# Patient Record
Sex: MALE | Race: WHITE | NOT HISPANIC OR LATINO | URBAN - METROPOLITAN AREA
[De-identification: names, ages, dates, MRNs, and addresses within clinical notes are randomized per-mention and may not be internally consistent; named-entity substitution may affect disease eponyms.]

---

## 2018-01-04 ENCOUNTER — OUTPATIENT (OUTPATIENT)
Dept: OUTPATIENT SERVICES | Facility: HOSPITAL | Age: 83
LOS: 1 days | Discharge: HOME | End: 2018-01-04

## 2018-01-04 DIAGNOSIS — R05 COUGH: ICD-10-CM

## 2018-02-09 ENCOUNTER — INPATIENT (INPATIENT)
Facility: HOSPITAL | Age: 83
LOS: 2 days | Discharge: HOME | End: 2018-02-12
Attending: HOSPITALIST

## 2018-02-09 VITALS
SYSTOLIC BLOOD PRESSURE: 143 MMHG | HEART RATE: 89 BPM | TEMPERATURE: 99 F | RESPIRATION RATE: 16 BRPM | DIASTOLIC BLOOD PRESSURE: 68 MMHG | OXYGEN SATURATION: 96 %

## 2018-02-09 DIAGNOSIS — Z98.890 OTHER SPECIFIED POSTPROCEDURAL STATES: Chronic | ICD-10-CM

## 2018-02-09 DIAGNOSIS — A41.9 SEPSIS, UNSPECIFIED ORGANISM: ICD-10-CM

## 2018-02-09 DIAGNOSIS — N20.0 CALCULUS OF KIDNEY: ICD-10-CM

## 2018-02-09 LAB
ALBUMIN SERPL ELPH-MCNC: 3.2 G/DL — SIGNIFICANT CHANGE UP (ref 3–5.5)
ALP SERPL-CCNC: 49 U/L — SIGNIFICANT CHANGE UP (ref 30–115)
ALT FLD-CCNC: 13 U/L — SIGNIFICANT CHANGE UP (ref 0–41)
ANION GAP SERPL CALC-SCNC: 10 MMOL/L — SIGNIFICANT CHANGE UP (ref 7–14)
APPEARANCE UR: (no result)
APTT BLD: 25 SEC — LOW (ref 27–39.2)
AST SERPL-CCNC: 18 U/L — SIGNIFICANT CHANGE UP (ref 0–41)
BACTERIA # UR AUTO: (no result) /HPF
BASOPHILS # BLD AUTO: 0.03 K/UL — SIGNIFICANT CHANGE UP (ref 0–0.2)
BASOPHILS NFR BLD AUTO: 0.1 % — SIGNIFICANT CHANGE UP (ref 0–1)
BILIRUB SERPL-MCNC: 1 MG/DL — SIGNIFICANT CHANGE UP (ref 0.2–1.2)
BILIRUB UR-MCNC: NEGATIVE — SIGNIFICANT CHANGE UP
BUN SERPL-MCNC: 21 MG/DL — HIGH (ref 10–20)
CALCIUM SERPL-MCNC: 8.8 MG/DL — SIGNIFICANT CHANGE UP (ref 8.5–10.1)
CHLORIDE SERPL-SCNC: 104 MMOL/L — SIGNIFICANT CHANGE UP (ref 98–110)
CO2 SERPL-SCNC: 25 MMOL/L — SIGNIFICANT CHANGE UP (ref 17–32)
COLOR SPEC: YELLOW — SIGNIFICANT CHANGE UP
CREAT SERPL-MCNC: 1.2 MG/DL — SIGNIFICANT CHANGE UP (ref 0.7–1.5)
DIFF PNL FLD: (no result)
EOSINOPHIL # BLD AUTO: 0 K/UL — SIGNIFICANT CHANGE UP (ref 0–0.7)
EOSINOPHIL NFR BLD AUTO: 0 % — SIGNIFICANT CHANGE UP (ref 0–8)
GLUCOSE SERPL-MCNC: 147 MG/DL — HIGH (ref 70–110)
GLUCOSE UR QL: NEGATIVE MG/DL — SIGNIFICANT CHANGE UP
HCT VFR BLD CALC: 43.8 % — SIGNIFICANT CHANGE UP (ref 42–52)
HGB BLD-MCNC: 14.6 G/DL — SIGNIFICANT CHANGE UP (ref 14–18)
IMM GRANULOCYTES NFR BLD AUTO: 0.6 % — HIGH (ref 0.1–0.3)
INR BLD: 1.3 RATIO — SIGNIFICANT CHANGE UP (ref 0.65–1.3)
KETONES UR-MCNC: NEGATIVE — SIGNIFICANT CHANGE UP
LACTATE SERPL-SCNC: 1.6 MMOL/L — SIGNIFICANT CHANGE UP (ref 0.5–2.2)
LEUKOCYTE ESTERASE UR-ACNC: (no result)
LYMPHOCYTES # BLD AUTO: 1.38 K/UL — SIGNIFICANT CHANGE UP (ref 1.2–3.4)
LYMPHOCYTES # BLD AUTO: 6.5 % — LOW (ref 20.5–51.1)
MCHC RBC-ENTMCNC: 30.4 PG — SIGNIFICANT CHANGE UP (ref 27–31)
MCHC RBC-ENTMCNC: 33.3 G/DL — SIGNIFICANT CHANGE UP (ref 32–37)
MCV RBC AUTO: 91.1 FL — SIGNIFICANT CHANGE UP (ref 80–94)
MONOCYTES # BLD AUTO: 1.28 K/UL — HIGH (ref 0.1–0.6)
MONOCYTES NFR BLD AUTO: 6 % — SIGNIFICANT CHANGE UP (ref 1.7–9.3)
NEUTROPHILS # BLD AUTO: 18.48 K/UL — HIGH (ref 1.4–6.5)
NEUTROPHILS NFR BLD AUTO: 86.8 % — HIGH (ref 42.2–75.2)
NITRITE UR-MCNC: POSITIVE
NRBC # BLD: 0 /100 WBCS — SIGNIFICANT CHANGE UP (ref 0–0)
PH UR: 6 — SIGNIFICANT CHANGE UP (ref 5–8)
PLATELET # BLD AUTO: 258 K/UL — SIGNIFICANT CHANGE UP (ref 130–400)
POTASSIUM SERPL-MCNC: 3.7 MMOL/L — SIGNIFICANT CHANGE UP (ref 3.5–5)
POTASSIUM SERPL-SCNC: 3.7 MMOL/L — SIGNIFICANT CHANGE UP (ref 3.5–5)
PROT SERPL-MCNC: 6.6 G/DL — SIGNIFICANT CHANGE UP (ref 6–8)
PROT UR-MCNC: 100 MG/DL
PROTHROM AB SERPL-ACNC: 14.1 SEC — HIGH (ref 9.95–12.87)
RBC # BLD: 4.81 M/UL — SIGNIFICANT CHANGE UP (ref 4.7–6.1)
RBC # FLD: 13.6 % — SIGNIFICANT CHANGE UP (ref 11.5–14.5)
RBC CASTS # UR COMP ASSIST: (no result) /HPF
SODIUM SERPL-SCNC: 139 MMOL/L — SIGNIFICANT CHANGE UP (ref 135–146)
SP GR SPEC: 1.02 — SIGNIFICANT CHANGE UP (ref 1.01–1.03)
UROBILINOGEN FLD QL: 0.2 MG/DL — SIGNIFICANT CHANGE UP (ref 0.2–0.2)
WBC # BLD: 21.3 K/UL — HIGH (ref 4.8–10.8)
WBC # FLD AUTO: 21.3 K/UL — HIGH (ref 4.8–10.8)
WBC UR QL: >50 /HPF

## 2018-02-09 RX ORDER — ENOXAPARIN SODIUM 100 MG/ML
40 INJECTION SUBCUTANEOUS EVERY 24 HOURS
Qty: 0 | Refills: 0 | Status: DISCONTINUED | OUTPATIENT
Start: 2018-02-09 | End: 2018-02-12

## 2018-02-09 RX ORDER — ACETAMINOPHEN 500 MG
650 TABLET ORAL EVERY 6 HOURS
Qty: 0 | Refills: 0 | Status: DISCONTINUED | OUTPATIENT
Start: 2018-02-09 | End: 2018-02-12

## 2018-02-09 RX ORDER — TAMSULOSIN HYDROCHLORIDE 0.4 MG/1
0.4 CAPSULE ORAL AT BEDTIME
Qty: 0 | Refills: 0 | Status: DISCONTINUED | OUTPATIENT
Start: 2018-02-09 | End: 2018-02-12

## 2018-02-09 RX ORDER — SODIUM CHLORIDE 9 MG/ML
1000 INJECTION INTRAMUSCULAR; INTRAVENOUS; SUBCUTANEOUS
Qty: 0 | Refills: 0 | Status: DISCONTINUED | OUTPATIENT
Start: 2018-02-09 | End: 2018-02-12

## 2018-02-09 RX ORDER — CEFTRIAXONE 500 MG/1
1 INJECTION, POWDER, FOR SOLUTION INTRAMUSCULAR; INTRAVENOUS ONCE
Qty: 0 | Refills: 0 | Status: COMPLETED | OUTPATIENT
Start: 2018-02-09 | End: 2018-02-09

## 2018-02-09 RX ORDER — ACETAMINOPHEN 500 MG
650 TABLET ORAL ONCE
Qty: 0 | Refills: 0 | Status: COMPLETED | OUTPATIENT
Start: 2018-02-09 | End: 2018-02-09

## 2018-02-09 RX ORDER — CEFTRIAXONE 500 MG/1
1 INJECTION, POWDER, FOR SOLUTION INTRAMUSCULAR; INTRAVENOUS EVERY 24 HOURS
Qty: 0 | Refills: 0 | Status: DISCONTINUED | OUTPATIENT
Start: 2018-02-09 | End: 2018-02-12

## 2018-02-09 RX ORDER — SODIUM CHLORIDE 9 MG/ML
3 INJECTION INTRAMUSCULAR; INTRAVENOUS; SUBCUTANEOUS ONCE
Qty: 0 | Refills: 0 | Status: COMPLETED | OUTPATIENT
Start: 2018-02-09 | End: 2018-02-09

## 2018-02-09 RX ADMIN — SODIUM CHLORIDE 75 MILLILITER(S): 9 INJECTION INTRAMUSCULAR; INTRAVENOUS; SUBCUTANEOUS at 22:34

## 2018-02-09 RX ADMIN — CEFTRIAXONE 100 GRAM(S): 500 INJECTION, POWDER, FOR SOLUTION INTRAMUSCULAR; INTRAVENOUS at 17:42

## 2018-02-09 RX ADMIN — SODIUM CHLORIDE 3 MILLILITER(S): 9 INJECTION INTRAMUSCULAR; INTRAVENOUS; SUBCUTANEOUS at 15:02

## 2018-02-09 RX ADMIN — TAMSULOSIN HYDROCHLORIDE 0.4 MILLIGRAM(S): 0.4 CAPSULE ORAL at 23:38

## 2018-02-09 RX ADMIN — Medication 650 MILLIGRAM(S): at 16:49

## 2018-02-09 RX ADMIN — SODIUM CHLORIDE 500 MILLILITER(S): 9 INJECTION INTRAMUSCULAR; INTRAVENOUS; SUBCUTANEOUS at 16:49

## 2018-02-09 NOTE — H&P ADULT - PROBLEM SELECTOR PLAN 1
Sepsis 2/2 UTI  Rocephin 1gm q24  follow up urine culture, repeat CBC  Keep carrasco for urinary retention  Tamsulosin QD  Tylenol prn for temp>101

## 2018-02-09 NOTE — H&P ADULT - NSHPLABSRESULTS_GEN_ALL_CORE
14.6   21.30 )-----------( 258      ( 2018 14:24 )             43.8           139  |  104  |  21<H>  ----------------------------<  147<H>  3.7   |  25  |  1.2    Ca    8.8      2018 14:24    TPro  6.6  /  Alb  3.2  /  TBili  1.0  /  DBili  x   /  AST  18  /  ALT  13  /  AlkPhos  49  02              Urinalysis Basic - ( 2018 14:24 )    Color: Yellow / Appearance: Turbid / S.025 / pH: x  Gluc: x / Ketone: Negative  / Bili: Negative / Urobili: 0.2 mg/dL   Blood: x / Protein: 100 mg/dL / Nitrite: Positive   Leuk Esterase: Large / RBC: 10-25 /HPF / WBC >50 /HPF   Sq Epi: x / Non Sq Epi: x / Bacteria: Few /HPF        PT/INR - ( 2018 14:24 )   PT: 14.10 sec;   INR: 1.30 ratio         PTT - ( 2018 14:24 )  PTT:25.0 sec    Lactate Trend   @ 14:24 Lactate:1.6

## 2018-02-09 NOTE — H&P ADULT - ASSESSMENT
82 year old M with h/o nephrolithiasis admitted for dysuria likely secondary to urinary retention with high suspicion for UTI s/p carrasco catheter placement

## 2018-02-09 NOTE — ED PROVIDER NOTE - PHYSICAL EXAMINATION
Physical Exam    Vital Signs: I have reviewed the initial vital signs.  Constitutional: well-nourished, appears stated age, no acute distress  Cardiovascular: regular rate, regular rhythm, well-perfused extremities  Respiratory: unlabored respiratory effort, clear to auscultation bilaterally  Gastrointestinal: soft, non-tender abdomen, no pulsatile mass  Musculoskeletal: supple neck, no lower extremity edema  Integumentary: warm, dry, no rash  Neurologic: awake, alert, cranial nerves II-XII grossly intact, extremities’ motor and sensory functions grossly intact  Psychiatric: appropriate mood, appropriate affect

## 2018-02-09 NOTE — ED PROVIDER NOTE - OBJECTIVE STATEMENT
83 yo M with  PMHx presents to ED with fever, chills and body aches last night. Patient had Singh catheter placed today. Patient had urinary retention one week ago, had Singh placed and antibiotics were given. PAtient's Singh was discontinued until this morning. Patient had difficulty urinating at home last night. No chest or abdominal pain now. Patient feels better after urology replaced Singh today. HE was sent in for evaluation.

## 2018-02-09 NOTE — ED PROVIDER NOTE - NS ED ROS FT
Review of Systems    Constitutional: (-) fever  Eyes/ENT: (-) blurry vision, (-) epistaxis  Cardiovascular: (-) chest pain, (-) syncope  Respiratory: (-) cough, (-) shortness of breath  Gastrointestinal: (-) vomiting, (-) diarrhea, + urinary retention  Musculoskeletal: (-) neck pain, (-) back pain, (-) joint pain  Integumentary: (-) rash, (-) edema  Neurological: (-) headache, (-) altered mental status  Psychiatric: (-) hallucinations  Allergic/Immunologic: (-) pruritus

## 2018-02-09 NOTE — ED PROVIDER NOTE - MEDICAL DECISION MAKING DETAILS
83 yo M presented to ED with fever, body aches, urinary retention. Patient had + UTI, febrile, and leukocytosis. Patient requires admission

## 2018-02-09 NOTE — H&P ADULT - PROBLEM SELECTOR PLAN 2
retroperitoneal US  consider  fu as inpatient  will need OP f/u with  in [place] once infection resolves

## 2018-02-09 NOTE — H&P ADULT - NSHPSOCIALHISTORY_GEN_ALL_CORE
Social History:    Marital Status:  ( x )    (   ) Single    (   )    (  )   Occupation:   Lives with: (  ) alone  (  ) children   ( x ) spouse   (  ) parents  (  ) other    Substance Use (street drugs): ( x ) never used  (  ) other:  Tobacco Usage:  (   ) never smoked   (  x ) former smoker   (   ) current smoker  (     ) pack year  (  3yrs ptp      ) last cigarette date  Alcohol Usage: occasional  Sexual History: monogamous with spouse

## 2018-02-09 NOTE — H&P ADULT - HISTORY OF PRESENT ILLNESS
82 year old M with PMHx of nephrolithiasis sent in from urologist's office for symptoms of acute dysuria including difficulty voiding, frequent nocturia with markedly reduced amounts of urine voided, and fever of 101 F measured at office. History goes back to 1/29/17 when pt underwent lithotripsy for a kidney stone after which a carrasco catheter was placed post procedure. Pt then revisited urologist in office on 2/7/2018 (2 days Ptp) where he had carrasco removed. Over past 1 day prior to presentation, pt started complaining of above symptoms so he visited urologist's office again and suspicion for urinary retention with possible UTI was made. Carrasco placed at urologist's office today. Pt denies gross hematuria, lightheadedness, weakness, or any other symptoms.

## 2018-02-09 NOTE — H&P ADULT - ATTENDING COMMENTS
Patient was evaluated and examined by bedside, no c/o abdominal pain, tolerating diet well, carrasco catheter draining clear yellow urine  All labs, radiology studies, VS was reviewed  GENERAL: NAD, AAOX3, patient is laying comfortably in bed  HEENT: AT, NC, PERRLA, SUPPLE, NO JVD, NO CB  LUNG: CTA B/L  CVS: normal S1, S2, RRR, NO M/G/R  ABDOMEN: soft, bowel sounds present, normoactive in all 4 quadrants, non-tender, non-distended  EXT: no E/C/C, positive PP b/l extremities  NEURO: no acute focal neurological deficits  SKIN: no rash, no ecchymosis    I agree with medical plan outlined by Medical resident as stated above.  - Continue IVF for next 24 hours, IV Rocephin, f/up urine cxs, CBC in am, ? voiding trial tomorrow  -continue flomax tx.

## 2018-02-09 NOTE — H&P ADULT - NSHPREVIEWOFSYSTEMS_GEN_ALL_CORE
REVIEW OF SYSTEMS:    CONSTITUTIONAL: fever 101 F  EYES/ENT: No visual changes;  No vertigo or throat pain   NECK: No pain or stiffness  RESPIRATORY: No cough, wheezing, hemoptysis; No shortness of breath  CARDIOVASCULAR: No chest pain or palpitations  GASTROINTESTINAL: No abdominal or epigastric pain. No nausea, vomiting, or hematemesis; No diarrhea or constipation. No melena or hematochezia.  GENITOURINARY: see HPI  NEUROLOGICAL: No numbness or weakness  SKIN: No itching, rashes

## 2018-02-09 NOTE — H&P ADULT - NSHPPHYSICALEXAM_GEN_ALL_CORE
PHYSICAL EXAM:    T(F): 101.1, Max: 101.6 (02-09-18 @ 16:49)  HR: 90  BP: 140/61  BP(mean): --  RR: 17  SpO2: 97%  GENERAL: NAD, well-developed  HEAD:  Atraumatic, Normocephalic  EYES: EOMI, PERRLA, conjunctiva and sclera clear  NECK: Supple, No JVD  CHEST/LUNG: Clear to auscultation bilaterally; No wheeze  HEART: Regular rate and rhythm; No murmurs, rubs, or gallops  ABDOMEN: Soft, Nontender, Nondistended; Bowel sounds present  EXTREMITIES:  2+ Peripheral Pulses, No clubbing, cyanosis, or edema  PSYCH: AAOx3  NEUROLOGY: non-focal  SKIN: No rashes or lesions

## 2018-02-10 LAB
ANION GAP SERPL CALC-SCNC: 7 MMOL/L — SIGNIFICANT CHANGE UP (ref 7–14)
BASOPHILS # BLD AUTO: 0.03 K/UL — SIGNIFICANT CHANGE UP (ref 0–0.2)
BASOPHILS NFR BLD AUTO: 0.2 % — SIGNIFICANT CHANGE UP (ref 0–1)
BUN SERPL-MCNC: 17 MG/DL — SIGNIFICANT CHANGE UP (ref 10–20)
CALCIUM SERPL-MCNC: 8 MG/DL — LOW (ref 8.5–10.1)
CHLORIDE SERPL-SCNC: 111 MMOL/L — HIGH (ref 98–110)
CO2 SERPL-SCNC: 22 MMOL/L — SIGNIFICANT CHANGE UP (ref 17–32)
CREAT SERPL-MCNC: 1 MG/DL — SIGNIFICANT CHANGE UP (ref 0.7–1.5)
EOSINOPHIL # BLD AUTO: 0.01 K/UL — SIGNIFICANT CHANGE UP (ref 0–0.7)
EOSINOPHIL NFR BLD AUTO: 0.1 % — SIGNIFICANT CHANGE UP (ref 0–8)
GLUCOSE SERPL-MCNC: 138 MG/DL — HIGH (ref 70–110)
HCT VFR BLD CALC: 38.9 % — LOW (ref 42–52)
HGB BLD-MCNC: 13 G/DL — LOW (ref 14–18)
IMM GRANULOCYTES NFR BLD AUTO: 0.8 % — HIGH (ref 0.1–0.3)
LYMPHOCYTES # BLD AUTO: 1.47 K/UL — SIGNIFICANT CHANGE UP (ref 1.2–3.4)
LYMPHOCYTES # BLD AUTO: 8.7 % — LOW (ref 20.5–51.1)
MCHC RBC-ENTMCNC: 30.4 PG — SIGNIFICANT CHANGE UP (ref 27–31)
MCHC RBC-ENTMCNC: 33.4 G/DL — SIGNIFICANT CHANGE UP (ref 32–37)
MCV RBC AUTO: 90.9 FL — SIGNIFICANT CHANGE UP (ref 80–94)
MONOCYTES # BLD AUTO: 1.05 K/UL — HIGH (ref 0.1–0.6)
MONOCYTES NFR BLD AUTO: 6.2 % — SIGNIFICANT CHANGE UP (ref 1.7–9.3)
NEUTROPHILS # BLD AUTO: 14.24 K/UL — HIGH (ref 1.4–6.5)
NEUTROPHILS NFR BLD AUTO: 84 % — HIGH (ref 42.2–75.2)
PLATELET # BLD AUTO: 198 K/UL — SIGNIFICANT CHANGE UP (ref 130–400)
POTASSIUM SERPL-MCNC: 3.7 MMOL/L — SIGNIFICANT CHANGE UP (ref 3.5–5)
POTASSIUM SERPL-SCNC: 3.7 MMOL/L — SIGNIFICANT CHANGE UP (ref 3.5–5)
RBC # BLD: 4.28 M/UL — LOW (ref 4.7–6.1)
RBC # FLD: 13.6 % — SIGNIFICANT CHANGE UP (ref 11.5–14.5)
SODIUM SERPL-SCNC: 140 MMOL/L — SIGNIFICANT CHANGE UP (ref 135–146)
WBC # BLD: 16.94 K/UL — HIGH (ref 4.8–10.8)
WBC # FLD AUTO: 16.94 K/UL — HIGH (ref 4.8–10.8)

## 2018-02-10 RX ADMIN — SODIUM CHLORIDE 75 MILLILITER(S): 9 INJECTION INTRAMUSCULAR; INTRAVENOUS; SUBCUTANEOUS at 12:23

## 2018-02-10 RX ADMIN — TAMSULOSIN HYDROCHLORIDE 0.4 MILLIGRAM(S): 0.4 CAPSULE ORAL at 21:41

## 2018-02-10 RX ADMIN — Medication 1200 MILLIGRAM(S): at 22:37

## 2018-02-10 RX ADMIN — ENOXAPARIN SODIUM 40 MILLIGRAM(S): 100 INJECTION SUBCUTANEOUS at 12:24

## 2018-02-10 RX ADMIN — Medication 650 MILLIGRAM(S): at 21:42

## 2018-02-10 RX ADMIN — CEFTRIAXONE 100 GRAM(S): 500 INJECTION, POWDER, FOR SOLUTION INTRAMUSCULAR; INTRAVENOUS at 12:19

## 2018-02-10 NOTE — PROVIDER CONTACT NOTE (OTHER) - SITUATION
Charan Jones [phone] made aware of pt's temp of 100.2 and stated ok to admin tylenol. MD Campbell also made aware pt is requesting cough medicine. Will continue to monitor

## 2018-02-11 LAB
-  AMIKACIN: SIGNIFICANT CHANGE UP
-  AMPICILLIN/SULBACTAM: SIGNIFICANT CHANGE UP
-  AMPICILLIN: SIGNIFICANT CHANGE UP
-  AZTREONAM: SIGNIFICANT CHANGE UP
-  CEFAZOLIN: SIGNIFICANT CHANGE UP
-  CEFEPIME: SIGNIFICANT CHANGE UP
-  CEFOXITIN: SIGNIFICANT CHANGE UP
-  CEFTAZIDIME: SIGNIFICANT CHANGE UP
-  CEFTRIAXONE: SIGNIFICANT CHANGE UP
-  CIPROFLOXACIN: SIGNIFICANT CHANGE UP
-  ERTAPENEM: SIGNIFICANT CHANGE UP
-  GENTAMICIN: SIGNIFICANT CHANGE UP
-  IMIPENEM: SIGNIFICANT CHANGE UP
-  LEVOFLOXACIN: SIGNIFICANT CHANGE UP
-  MEROPENEM: SIGNIFICANT CHANGE UP
-  NITROFURANTOIN: SIGNIFICANT CHANGE UP
-  PIPERACILLIN/TAZOBACTAM: SIGNIFICANT CHANGE UP
-  TOBRAMYCIN: SIGNIFICANT CHANGE UP
-  TRIMETHOPRIM/SULFAMETHOXAZOLE: SIGNIFICANT CHANGE UP
CULTURE RESULTS: SIGNIFICANT CHANGE UP
HCT VFR BLD CALC: 37.2 % — LOW (ref 42–52)
HGB BLD-MCNC: 12.2 G/DL — LOW (ref 14–18)
MCHC RBC-ENTMCNC: 30 PG — SIGNIFICANT CHANGE UP (ref 27–31)
MCHC RBC-ENTMCNC: 32.8 G/DL — SIGNIFICANT CHANGE UP (ref 32–37)
MCV RBC AUTO: 91.6 FL — SIGNIFICANT CHANGE UP (ref 80–94)
METHOD TYPE: SIGNIFICANT CHANGE UP
NRBC # BLD: 0 /100 WBCS — SIGNIFICANT CHANGE UP (ref 0–0)
ORGANISM # SPEC MICROSCOPIC CNT: SIGNIFICANT CHANGE UP
ORGANISM # SPEC MICROSCOPIC CNT: SIGNIFICANT CHANGE UP
PLATELET # BLD AUTO: 191 K/UL — SIGNIFICANT CHANGE UP (ref 130–400)
RBC # BLD: 4.06 M/UL — LOW (ref 4.7–6.1)
RBC # FLD: 13.5 % — SIGNIFICANT CHANGE UP (ref 11.5–14.5)
SPECIMEN SOURCE: SIGNIFICANT CHANGE UP
WBC # BLD: 10.63 K/UL — SIGNIFICANT CHANGE UP (ref 4.8–10.8)
WBC # FLD AUTO: 10.63 K/UL — SIGNIFICANT CHANGE UP (ref 4.8–10.8)

## 2018-02-11 RX ADMIN — CEFTRIAXONE 100 GRAM(S): 500 INJECTION, POWDER, FOR SOLUTION INTRAMUSCULAR; INTRAVENOUS at 12:00

## 2018-02-11 RX ADMIN — TAMSULOSIN HYDROCHLORIDE 0.4 MILLIGRAM(S): 0.4 CAPSULE ORAL at 21:52

## 2018-02-11 RX ADMIN — Medication 1200 MILLIGRAM(S): at 05:53

## 2018-02-11 RX ADMIN — SODIUM CHLORIDE 75 MILLILITER(S): 9 INJECTION INTRAMUSCULAR; INTRAVENOUS; SUBCUTANEOUS at 05:54

## 2018-02-11 RX ADMIN — SODIUM CHLORIDE 500 MILLILITER(S): 9 INJECTION INTRAMUSCULAR; INTRAVENOUS; SUBCUTANEOUS at 08:28

## 2018-02-11 RX ADMIN — Medication 1200 MILLIGRAM(S): at 17:10

## 2018-02-11 NOTE — PROGRESS NOTE ADULT - ATTENDING COMMENTS
Patient was evaluated and examined by bedside, no c/o abdominal pain, tolerating diet well, carrasco draining clear yellow urine  All labs, radiology studies, VS was reviewed  GENERAL: NAD, AAOX3, patient is laying comfortably in bed  HEENT: AT, NC, PERRLA, SUPPLE, NO JVD, NO CB  LUNG: CTA B/L  CVS: normal S1, S2, RRR, NO M/G/R  ABDOMEN: soft, bowel sounds present, normoactive in all 4 quadrants, non-tender, non-distended  EXT: no E/C/C, positive PP b/l extremities  NEURO: no acute focal neurological deficits  SKIN: no rash, no ecchymosis    I agree with medical plan outlined by Medical resident as stated above.  Will continue IV Rocephin, IVF till tomorrow, f/up E.coli-sensitivity  consider for patient to be d/c home tomorrow with outpatient  f/up if medically improves.

## 2018-02-11 NOTE — PROGRESS NOTE ADULT - SUBJECTIVE AND OBJECTIVE BOX
Not Available Doctor; Landon Montero; User ADM; Colette Liang; [place] Escalona; [place] Escalona; Pipe Aguilera; Sonia Artis; Ordering Physician; Quynh Murillo; Charan Campbell  Patient is a 82y old  Male who presents with a chief complaint of Dysuria. sent in from urologist's office for symptoms of acute dysuria including difficulty voiding, frequent nocturia with markedly reduced amounts of urine voided, and fever of 101 F measured at office.       Vital Signs Last 24 Hrs  T(C): 35.7 (2018 06:27), Max: 37.9 (10 Feb 2018 13:07)  T(F): 96.2 (2018 06:27), Max: 100.2 (10 Feb 2018 13:07)  HR: 85 (2018 06:27) (74 - 85)  BP: 116/60 (2018 06:27) (116/60 - 127/64)  BP(mean): --  RR: 18 (2018 06:27) (18 - 18)  SpO2: 93% (2018 08:01) (93% - 94%)  CAPILLARY BLOOD GLUCOSE      PAST MEDICAL & SURGICAL HISTORY:  Kidney stones  H/O lithotripsy    MEDICATIONS  (STANDING):  cefTRIAXone   IVPB 1 Gram(s) IV Intermittent every 24 hours  enoxaparin Injectable 40 milliGRAM(s) SubCutaneous every 24 hours  guaiFENesin ER 1200 milliGRAM(s) Oral every 12 hours  sodium chloride 0.9%. 1000 milliLiter(s) (500 mL/Hr) IV Continuous <Continuous>  sodium chloride 0.9%. 1000 milliLiter(s) (75 mL/Hr) IV Continuous <Continuous>  tamsulosin 0.4 milliGRAM(s) Oral at bedtime    MEDICATIONS  (PRN):  acetaminophen   Tablet 650 milliGRAM(s) Oral every 6 hours PRN For Temp greater than 38 C (100.4 F)      Physical Exam:  General: NAD, laying comfortably in the bed  HEENT: Neck supple, no lymphadenopathy, PERRLA, EOMI  Heart: RRR, S1, S2 noted, no murmurs, rubs, gallops  Lungs: CTA b/l, no wheezes, rales, rhonchi   Abdomen: soft, non tender, non distended, + bowel sounds, no suprapubic tenderness, no CVA tenderness b/l  Extremities: no C/C/E, full ROM in all extremities, 2+ pulses DP b/l  Neuro: A&O x 3, no focal deficits  Skin: No rashes    Labs:                        13.0   16.94 )-----------( 198      ( 10 Feb 2018 04:30 )             38.9             02-10    140  |  111<H>  |  17  ----------------------------<  138<H>  3.7   |  22  |  1.0    Ca    8.0<L>      10 Feb 2018 04:30    TPro  6.6  /  Alb  3.2  /  TBili  1.0  /  DBili  x   /  AST  18  /  ALT  13  /  AlkPhos  49  02-09    LIVER FUNCTIONS - ( 2018 14:24 )  Alb: 3.2 g/dL / Pro: 6.6 g/dL / ALK PHOS: 49 U/L / ALT: 13 U/L / AST: 18 U/L / GGT: x                 PT/INR - ( 2018 14:24 )   PT: 14.10 sec;   INR: 1.30 ratio         PTT - ( 2018 14:24 )  PTT:25.0 sec        Urinalysis Basic - ( 2018 14:24 )    Color: Yellow / Appearance: Turbid / S.025 / pH: x  Gluc: x / Ketone: Negative  / Bili: Negative / Urobili: 0.2 mg/dL   Blood: x / Protein: 100 mg/dL / Nitrite: Positive   Leuk Esterase: Large / RBC: 10-25 /HPF / WBC >50 /HPF   Sq Epi: x / Non Sq Epi: x / Bacteria: Few /HPF        Culture - Urine (collected 2018 14:24)  Source: .Urine Catheterized  Preliminary Report (10 Feb 2018 17:26):    >100,000 CFU/ml Escherichia coli      I&O's Summary    10 Feb 2018 07:01  -  2018 07:00  --------------------------------------------------------  IN: 1000 mL / OUT: 300 mL / NET: 700 mL        Imaging:  CXR: negative    ECG:

## 2018-02-12 ENCOUNTER — TRANSCRIPTION ENCOUNTER (OUTPATIENT)
Age: 83
End: 2018-02-12

## 2018-02-12 VITALS
HEART RATE: 70 BPM | SYSTOLIC BLOOD PRESSURE: 146 MMHG | RESPIRATION RATE: 18 BRPM | DIASTOLIC BLOOD PRESSURE: 75 MMHG | TEMPERATURE: 96 F

## 2018-02-12 LAB
ANION GAP SERPL CALC-SCNC: 8 MMOL/L — SIGNIFICANT CHANGE UP (ref 7–14)
BUN SERPL-MCNC: 13 MG/DL — SIGNIFICANT CHANGE UP (ref 10–20)
CALCIUM SERPL-MCNC: 7.6 MG/DL — LOW (ref 8.5–10.1)
CHLORIDE SERPL-SCNC: 107 MMOL/L — SIGNIFICANT CHANGE UP (ref 98–110)
CO2 SERPL-SCNC: 22 MMOL/L — SIGNIFICANT CHANGE UP (ref 17–32)
CREAT SERPL-MCNC: 0.9 MG/DL — SIGNIFICANT CHANGE UP (ref 0.7–1.5)
GLUCOSE SERPL-MCNC: 135 MG/DL — HIGH (ref 70–110)
HCT VFR BLD CALC: 41 % — LOW (ref 42–52)
HGB BLD-MCNC: 13.8 G/DL — LOW (ref 14–18)
MCHC RBC-ENTMCNC: 30.3 PG — SIGNIFICANT CHANGE UP (ref 27–31)
MCHC RBC-ENTMCNC: 33.7 G/DL — SIGNIFICANT CHANGE UP (ref 32–37)
MCV RBC AUTO: 90.1 FL — SIGNIFICANT CHANGE UP (ref 80–94)
NRBC # BLD: 0 /100 WBCS — SIGNIFICANT CHANGE UP (ref 0–0)
PLATELET # BLD AUTO: 231 K/UL — SIGNIFICANT CHANGE UP (ref 130–400)
POTASSIUM SERPL-MCNC: 3.8 MMOL/L — SIGNIFICANT CHANGE UP (ref 3.5–5)
POTASSIUM SERPL-SCNC: 3.8 MMOL/L — SIGNIFICANT CHANGE UP (ref 3.5–5)
RBC # BLD: 4.55 M/UL — LOW (ref 4.7–6.1)
RBC # FLD: 13.5 % — SIGNIFICANT CHANGE UP (ref 11.5–14.5)
SODIUM SERPL-SCNC: 137 MMOL/L — SIGNIFICANT CHANGE UP (ref 135–146)
WBC # BLD: 10.3 K/UL — SIGNIFICANT CHANGE UP (ref 4.8–10.8)
WBC # FLD AUTO: 10.3 K/UL — SIGNIFICANT CHANGE UP (ref 4.8–10.8)

## 2018-02-12 RX ORDER — CEFUROXIME AXETIL 250 MG
2 TABLET ORAL
Qty: 32 | Refills: 0 | OUTPATIENT
Start: 2018-02-12 | End: 2018-02-19

## 2018-02-12 RX ORDER — TAMSULOSIN HYDROCHLORIDE 0.4 MG/1
1 CAPSULE ORAL
Qty: 30 | Refills: 0 | OUTPATIENT
Start: 2018-02-12 | End: 2018-03-13

## 2018-02-12 RX ORDER — TAMSULOSIN HYDROCHLORIDE 0.4 MG/1
1 CAPSULE ORAL
Qty: 0 | Refills: 0 | COMMUNITY
Start: 2018-02-12

## 2018-02-12 RX ADMIN — SODIUM CHLORIDE 75 MILLILITER(S): 9 INJECTION INTRAMUSCULAR; INTRAVENOUS; SUBCUTANEOUS at 03:28

## 2018-02-12 RX ADMIN — CEFTRIAXONE 100 GRAM(S): 500 INJECTION, POWDER, FOR SOLUTION INTRAMUSCULAR; INTRAVENOUS at 11:24

## 2018-02-12 RX ADMIN — Medication 1200 MILLIGRAM(S): at 05:12

## 2018-02-12 NOTE — DISCHARGE NOTE ADULT - MEDICATION SUMMARY - MEDICATIONS TO TAKE
I will START or STAY ON the medications listed below when I get home from the hospital:    tamsulosin 0.4 mg oral capsule  -- 1 cap(s) by mouth once a day (at bedtime)  -- Indication: For BPH    Ceftin 250 mg oral tablet  -- 2 tab(s) by mouth 2 times a day   -- Finish all this medication unless otherwise directed by prescriber.  Medication should be taken with plenty of water.  Take with food or milk.    -- Indication: For urinary tract infection

## 2018-02-12 NOTE — DISCHARGE NOTE ADULT - CARE PROVIDER_API CALL
Esdras Lehman), Urology  [address] [place] [address]  Phone: [phone]  Fax: [phone]

## 2018-02-12 NOTE — DISCHARGE NOTE ADULT - PLAN OF CARE
resolved Continue using antibiotics for 8 more days as prescribed. Follow up with your urologist Dr. Lehman to prevent complications of disease Please follow up with your primary doctor and urologist Dr. Lehman

## 2018-02-12 NOTE — PROGRESS NOTE ADULT - SUBJECTIVE AND OBJECTIVE BOX
Discharge Summary:    LOIS KANG  [facility] T2-3A 026 A ([facility] T2-3A)      HPI: Patient presented with acute fever, chills, lethargic state, s/p recent lithotripsy procedure, was dx. with acute urinary retention, carrasco was placed in  clinic on 2/8/18 prior to admission.       Patient was evaluated and examined  by bedside, no active complains, patient is anticipated for d/c home today      REVIEW OF SYSTEMS:  please see pertinent positives mentioned above, all other 12 ROS negative      T(C): , Max: 37.5 (02-11-18 @ 13:57)  HR: 65 (02-12-18 @ 06:14)  BP: 124/60 (02-12-18 @ 06:14)  RR: 18 (02-12-18 @ 06:14)  SpO2: 93% (02-12-18 @ 08:14)  CAPILLARY BLOOD GLUCOSE          PHYSICAL EXAM:  General: NAD, AAOX3, patient is laying comfortably in bed  HEENT: AT, NC, Supple, NO JVD, NO CB  Lungs: CTA B/L, no wheezing, no rhonchi  CVS: normal S1, S2, RRR, NO M/G/R  Abdomen: soft, bowel sounds present, non-tender, non-distended  : carrasco draining clear yellow urine  Extremities: no edema, no clubbing, no cyanosis, positive peripheral pulses b/l  Neuro: no acute focal neurological deficits  Skin: no rush, no ecchymosis      LAB  CBC  Date: 02-11-18 @ 08:12  Mean cell Rwljlaygld92.0  Mean cell Hemoglobin Conc32.8  Mean cell Volum 91.6  Platelet count-Automate 191  RBC Count 4.06  Red Cell Distrib Width13.5  WBC Count10.63  % Albumin, Urine--  Hematocrit 37.2  Hemoglobin 12.2  CBC  Date: 02-10-18 @ 04:30  Mean cell Qoumugmgdh22.4  Mean cell Hemoglobin Conc33.4  Mean cell Volum 90.9  Platelet count-Automate 198  RBC Count 4.28  Red Cell Distrib Width13.6  WBC Count16.94  % Albumin, Urine--  Hematocrit 38.9  Hemoglobin 13.0  CBC  Date: 02-09-18 @ 14:24  Mean cell Ydwvqqtpxw19.4  Mean cell Hemoglobin Conc33.3  Mean cell Volum 91.1  Platelet count-Automate 258  RBC Count 4.81  Red Cell Distrib Width13.6  WBC Count21.30  % Albumin, Urine--  Hematocrit 43.8  Hemoglobin 14.6    BMP  02-10-18 @ 04:30  Blood Gas Arterial-Calcium,Ionized--  Blood Urea Nitrogen, Serum 17 mg/dL [10 - 20]  Carbon Dioxide, Serum22 mmol/L [17 - 32]  Chloride, Wbjss346 mmol/L<H> [98 - 110]  Creatinie, Serum1.0 mg/dL [0.7 - 1.5]  Glucose, Vmfqt861 mg/dL<H> [70 - 110]  Potassium, Serum3.7 mmol/L [3.5 - 5.0]  Sodium, Serum 140 mmol/L [135 - 146]  BMP  02-09-18 @ 14:24  Blood Gas Arterial-Calcium,Ionized--  Blood Urea Nitrogen, Serum 21 mg/dL<H> [10 - 20]  Carbon Dioxide, Serum25 mmol/L [17 - 32]  Chloride, Miwuu899 mmol/L [98 - 110]  Creatinie, Serum1.2 mg/dL [0.7 - 1.5]  Glucose, Krmdu898 mg/dL<H> [70 - 110]  Potassium, Serum3.7 mmol/L [3.5 - 5.0]  Sodium, Serum 139 mmol/L [135 - 146]              Microbiology:    Culture - Blood (collected 02-10-18 @ 04:30)  Source: .Blood Blood  Preliminary Report (02-11-18 @ 16:01):    No growth to date.    Culture - Urine (collected 02-09-18 @ 14:24)  Source: .Urine Catheterized  Final Report (02-11-18 @ 21:29):    >100,000 CFU/ml Escherichia coli ESBL  Organism: Escherichia coli ESBL (02-11-18 @ 21:29)  Organism: Escherichia coli ESBL (02-11-18 @ 21:29)      -  Amikacin: S <=8      -  Ampicillin: R >16      -  Ampicillin/Sulbactam: R 16/8      -  Aztreonam: R 8      -  Cefazolin: R >16      -  Cefepime: R <=2      -  Cefoxitin: S <=4      -  Ceftazidime: R 4      -  Ceftriaxone: R 32      -  Ciprofloxacin: R >2      -  Ertapenem: S <=0.5      -  Gentamicin: S <=1      -  Imipenem: S <=1      -  Levofloxacin: R >4      -  Meropenem: S <=1      -  Nitrofurantoin: S <=32      -  Piperacillin/Tazobactam: R <=8      -  Tobramycin: R >8      -  Trimethoprim/Sulfamethoxazole: R >2/38      Method Type: HERMILO          Medications:  acetaminophen   Tablet 650 milliGRAM(s) Oral every 6 hours PRN  cefTRIAXone   IVPB 1 Gram(s) IV Intermittent every 24 hours  enoxaparin Injectable 40 milliGRAM(s) SubCutaneous every 24 hours  guaiFENesin ER 1200 milliGRAM(s) Oral every 12 hours  sodium chloride 0.9%. 1000 milliLiter(s) IV Continuous <Continuous>  sodium chloride 0.9%. 1000 milliLiter(s) IV Continuous <Continuous>  tamsulosin 0.4 milliGRAM(s) Oral at bedtime        Hospitalization stay by problems List:    #Sepsis due to Ecoli post  lithotripsy procedure with recurrent urinary retention  E. Coli cultured from urine, sensitive to Ceftin, patient will complete total of 10 days course   blood cx- no growth, leukocytosis resolved  c/w carrasco catheter for urinary retention- outpatient  f/up for voiding trial  c/w tamsulosin    #Prior nephrolithiasis s/p lithotripsy as o/p  management as above    Patient is medically stable for d/c home today    Patient verbalized good understanding of discharge instructions and agreed with discharge plan.      Code status full code

## 2018-02-12 NOTE — DISCHARGE NOTE ADULT - CARE PLAN
Principal Discharge DX:	Sepsis, due to unspecified organism  Goal:	resolved  Assessment and plan of treatment:	Continue using antibiotics for 8 more days as prescribed. Follow up with your urologist Dr. Lehman  Secondary Diagnosis:	Kidney stones  Goal:	to prevent complications of disease  Assessment and plan of treatment:	Please follow up with your primary doctor and urologist Dr. Lehman

## 2018-02-12 NOTE — DISCHARGE NOTE ADULT - PATIENT PORTAL LINK FT
You can access the [facility] Patient Portal, offered by [facility], by registering with the following website: [email or web address]

## 2018-02-14 DIAGNOSIS — R30.0 DYSURIA: ICD-10-CM

## 2018-02-14 DIAGNOSIS — Z87.442 PERSONAL HISTORY OF URINARY CALCULI: ICD-10-CM

## 2018-02-14 DIAGNOSIS — A41.51 SEPSIS DUE TO ESCHERICHIA COLI [E. COLI]: ICD-10-CM

## 2018-02-14 DIAGNOSIS — N10 ACUTE PYELONEPHRITIS: ICD-10-CM

## 2018-02-14 DIAGNOSIS — D72.829 ELEVATED WHITE BLOOD CELL COUNT, UNSPECIFIED: ICD-10-CM

## 2018-02-14 DIAGNOSIS — Z98.890 OTHER SPECIFIED POSTPROCEDURAL STATES: ICD-10-CM

## 2018-02-14 DIAGNOSIS — R33.9 RETENTION OF URINE, UNSPECIFIED: ICD-10-CM

## 2018-02-14 DIAGNOSIS — R50.9 FEVER, UNSPECIFIED: ICD-10-CM

## 2018-02-14 DIAGNOSIS — Z96.0 PRESENCE OF UROGENITAL IMPLANTS: ICD-10-CM
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
## 2018-02-15 DIAGNOSIS — T81.4XXA INFECTION FOLLOWING A PROCEDURE, INITIAL ENCOUNTER: ICD-10-CM

## 2018-02-15 LAB
CULTURE RESULTS: SIGNIFICANT CHANGE UP
SPECIMEN SOURCE: SIGNIFICANT CHANGE UP

## 2018-03-28 ENCOUNTER — INPATIENT (INPATIENT)
Facility: HOSPITAL | Age: 83
LOS: 3 days | Discharge: HOME | End: 2018-04-01
Attending: INTERNAL MEDICINE

## 2018-03-28 VITALS
DIASTOLIC BLOOD PRESSURE: 87 MMHG | OXYGEN SATURATION: 95 % | SYSTOLIC BLOOD PRESSURE: 160 MMHG | HEART RATE: 111 BPM | RESPIRATION RATE: 18 BRPM | TEMPERATURE: 96 F

## 2018-03-28 DIAGNOSIS — T83.511A INFECTION AND INFLAMMATORY REACTION DUE TO INDWELLING URETHRAL CATHETER, INITIAL ENCOUNTER: ICD-10-CM

## 2018-03-28 DIAGNOSIS — Z98.890 OTHER SPECIFIED POSTPROCEDURAL STATES: Chronic | ICD-10-CM

## 2018-03-28 PROBLEM — N20.0 CALCULUS OF KIDNEY: Chronic | Status: ACTIVE | Noted: 2018-02-09

## 2018-03-28 LAB
ANION GAP SERPL CALC-SCNC: 14 MMOL/L — SIGNIFICANT CHANGE UP (ref 7–14)
APPEARANCE UR: (no result)
BACTERIA # UR AUTO: (no result) /HPF
BILIRUB UR-MCNC: NEGATIVE — SIGNIFICANT CHANGE UP
BUN SERPL-MCNC: 18 MG/DL — SIGNIFICANT CHANGE UP (ref 10–20)
CALCIUM SERPL-MCNC: 8.7 MG/DL — SIGNIFICANT CHANGE UP (ref 8.5–10.1)
CHLORIDE SERPL-SCNC: 103 MMOL/L — SIGNIFICANT CHANGE UP (ref 98–110)
CO2 SERPL-SCNC: 21 MMOL/L — SIGNIFICANT CHANGE UP (ref 17–32)
COLOR SPEC: YELLOW — SIGNIFICANT CHANGE UP
CREAT SERPL-MCNC: 1 MG/DL — SIGNIFICANT CHANGE UP (ref 0.7–1.5)
DIFF PNL FLD: (no result)
GLUCOSE SERPL-MCNC: 152 MG/DL — HIGH (ref 70–99)
GLUCOSE UR QL: NEGATIVE MG/DL — SIGNIFICANT CHANGE UP
HCT VFR BLD CALC: 42 % — SIGNIFICANT CHANGE UP (ref 42–52)
HGB BLD-MCNC: 14.1 G/DL — SIGNIFICANT CHANGE UP (ref 14–18)
KETONES UR-MCNC: NEGATIVE — SIGNIFICANT CHANGE UP
LACTATE SERPL-SCNC: 1.1 MMOL/L — SIGNIFICANT CHANGE UP (ref 0.5–2.2)
LDH SERPL L TO P-CCNC: 241 U/L — SIGNIFICANT CHANGE UP (ref 50–242)
LEUKOCYTE ESTERASE UR-ACNC: (no result)
MCHC RBC-ENTMCNC: 29.9 PG — SIGNIFICANT CHANGE UP (ref 27–31)
MCHC RBC-ENTMCNC: 33.6 G/DL — SIGNIFICANT CHANGE UP (ref 32–37)
MCV RBC AUTO: 89.2 FL — SIGNIFICANT CHANGE UP (ref 80–94)
NITRITE UR-MCNC: POSITIVE
NRBC # BLD: 0 /100 WBCS — SIGNIFICANT CHANGE UP (ref 0–0)
PH UR: 6 — SIGNIFICANT CHANGE UP (ref 5–8)
PLATELET # BLD AUTO: 184 K/UL — SIGNIFICANT CHANGE UP (ref 130–400)
POTASSIUM SERPL-MCNC: 4.3 MMOL/L — SIGNIFICANT CHANGE UP (ref 3.5–5)
POTASSIUM SERPL-SCNC: 4.3 MMOL/L — SIGNIFICANT CHANGE UP (ref 3.5–5)
PROT UR-MCNC: 100 MG/DL
RBC # BLD: 4.71 M/UL — SIGNIFICANT CHANGE UP (ref 4.7–6.1)
RBC # FLD: 14.5 % — SIGNIFICANT CHANGE UP (ref 11.5–14.5)
SODIUM SERPL-SCNC: 138 MMOL/L — SIGNIFICANT CHANGE UP (ref 135–146)
SP GR SPEC: 1.01 — SIGNIFICANT CHANGE UP (ref 1.01–1.03)
UROBILINOGEN FLD QL: 0.2 MG/DL — SIGNIFICANT CHANGE UP (ref 0.2–0.2)
WBC # BLD: 10.77 K/UL — SIGNIFICANT CHANGE UP (ref 4.8–10.8)
WBC # FLD AUTO: 10.77 K/UL — SIGNIFICANT CHANGE UP (ref 4.8–10.8)
WBC UR QL: >50 /HPF

## 2018-03-28 RX ORDER — MEROPENEM 1 G/30ML
1000 INJECTION INTRAVENOUS EVERY 8 HOURS
Qty: 0 | Refills: 0 | Status: DISCONTINUED | OUTPATIENT
Start: 2018-03-28 | End: 2018-03-30

## 2018-03-28 RX ORDER — SODIUM CHLORIDE 9 MG/ML
2000 INJECTION INTRAMUSCULAR; INTRAVENOUS; SUBCUTANEOUS ONCE
Qty: 0 | Refills: 0 | Status: DISCONTINUED | OUTPATIENT
Start: 2018-03-28 | End: 2018-03-28

## 2018-03-28 RX ORDER — TAMSULOSIN HYDROCHLORIDE 0.4 MG/1
0.4 CAPSULE ORAL AT BEDTIME
Qty: 0 | Refills: 0 | Status: DISCONTINUED | OUTPATIENT
Start: 2018-03-28 | End: 2018-04-01

## 2018-03-28 RX ORDER — CEFTRIAXONE 500 MG/1
1 INJECTION, POWDER, FOR SOLUTION INTRAMUSCULAR; INTRAVENOUS ONCE
Qty: 0 | Refills: 0 | Status: DISCONTINUED | OUTPATIENT
Start: 2018-03-28 | End: 2018-03-28

## 2018-03-28 RX ORDER — MEROPENEM 1 G/30ML
500 INJECTION INTRAVENOUS ONCE
Qty: 0 | Refills: 0 | Status: COMPLETED | OUTPATIENT
Start: 2018-03-28 | End: 2018-03-28

## 2018-03-28 RX ORDER — ENOXAPARIN SODIUM 100 MG/ML
40 INJECTION SUBCUTANEOUS EVERY 24 HOURS
Qty: 0 | Refills: 0 | Status: DISCONTINUED | OUTPATIENT
Start: 2018-03-28 | End: 2018-04-01

## 2018-03-28 RX ADMIN — MEROPENEM 100 MILLIGRAM(S): 1 INJECTION INTRAVENOUS at 22:40

## 2018-03-28 RX ADMIN — TAMSULOSIN HYDROCHLORIDE 0.4 MILLIGRAM(S): 0.4 CAPSULE ORAL at 22:40

## 2018-03-28 RX ADMIN — MEROPENEM 100 MILLIGRAM(S): 1 INJECTION INTRAVENOUS at 18:25

## 2018-03-28 RX ADMIN — ENOXAPARIN SODIUM 40 MILLIGRAM(S): 100 INJECTION SUBCUTANEOUS at 22:39

## 2018-03-28 NOTE — ED PROVIDER NOTE - OBJECTIVE STATEMENT
82 year old M with PMHx of nephrolithiasis p/w urinary incontinence. History goes back to 1/29/17 when pt underwent lithotripsy for a kidney stone after which a carrasco catheter was placed post procedure. Pt then revisited urologist in office on 2/7/2018 where he had carrasco removed. The following day, pt started complaining of dysuria so he visited urologist's office again and suspicion for urinary retention with possible UTI was made. Carrasco placed at urologist's office. Pt was then sent from the urologists office to the ed for suspicion of UTI-Patient urine cx found to be E. Coli and patient given rocephin. Pt remianed to have carrasco and had it removed 2 days ago at the urologists office, but states since he has had an urge to urinate but has not been able to. Bedisde bladder scan was done in the ED and bladder was filled and physical exam was significant fo rsuprapubic pressure. Pt denies, fevers, chills, sob, n/v/d. 82 year old M with PMHx of nephrolithiasis p/w urinary incontinence. History goes back to 1/29/18 when pt underwent lithotripsy for a kidney stone after which a carrasco catheter was placed post procedure. Pt then revisited urologist in office on 2/7/2018 where he had carrasco removed. The following day, pt started complaining of dysuria so he visited urologist's office again and suspicion for urinary retention with possible UTI was made. Carrasco placed at urologist's office. Pt was then sent from the urologists office to the ed for suspicion of UTI-Patient urine cx found to be E. Coli MDR. Pt remianed to have carrasco and had it removed 2 days ago at the urologists office, but states since he has had an urge to urinate but has not been able to. Bedisde bladder scan was done in the ED and bladder was filled and physical exam was significant for suprapubic pressure. Pt denies, fevers, chills, sob, n/v/d.

## 2018-03-28 NOTE — H&P ADULT - ATTENDING COMMENTS
82Y M with pmh of nephrolithiasis s/p lithotripsy presents to the ED with inability to urinate for 1 day. Carrasco inserted in ED drained 500cc of urine and pt has positive UA.    1) Urinary retention : Probably from having carrasco for so long ? Relieved for now with carrasco.   F/u Urology for any further urodynamic testing and interventions ?     2) h/o recent ESBL E Coli : Apparently had completed the treatment recently hence not sure what to make of the positive UA this time.   Has no e/o fever/dysuria/flank pain. Could this be colonization ?   f/u ID     3) h/o BPH : On flomax     Will f/u

## 2018-03-28 NOTE — ED PROVIDER NOTE - PROGRESS NOTE DETAILS
ATTENDING NOTE:  83 y/o M here for urinary retention and concern for UTI. Pt is s/p recent lithotripsy in late January. After procedure, had carrasco temporarily inserted. Carrasco was removed and subsequently reinserted in late February. Carrasco removed 2 days ago. However pt states he is now having decreased urination. +Associated suprapubic discomfort. States he feels like he may have a UTI. No fever/chills or back pain.  On exam: Pt is non-toxic, WA, in NAD. S1S2 regular. Lungs CTAB. Abdomen is soft, (+)suprapubic distention. No CVAT.  Plan for labs, UA, carrasco insertion and reassess. Pt seen and examined. Pt had recent hx of MDR UTI. Currently UA is positive for nitrite, leukocyte esterase, and wbcs. Will admit pt to medicine service for hx od MDR uti pt with  now sirs criteria. blood cx and fluid added on.

## 2018-03-28 NOTE — H&P ADULT - NSHPLABSRESULTS_GEN_ALL_CORE
14.1   10.77 )-----------( 184      ( 28 Mar 2018 14:40 )             42.0           138  |  103  |  18  ----------------------------<  152<H>  4.3   |  21  |  1.0    Ca    8.7      28 Mar 2018 14:40            Urinalysis Basic - ( 28 Mar 2018 14:40 )    Color: Yellow / Appearance: Turbid / S.015 / pH: x  Gluc: x / Ketone: Negative  / Bili: Negative / Urobili: 0.2 mg/dL   Blood: x / Protein: 100 mg/dL / Nitrite: Positive   Leuk Esterase: Large / RBC: x / WBC >50 /HPF   Sq Epi: x / Non Sq Epi: x / Bacteria: Moderate /HPF      Lactate Trend   @ 14:40 Lactate:1.1

## 2018-03-28 NOTE — ED PROVIDER NOTE - PLAN OF CARE
carrasco placed place carrasco  urine culture  ua  cbc  bmp carrasco placed in the ED 3/28/2018 by nurse   urine culture collected  ua+  Given meropenem x1   cbc  bmp

## 2018-03-28 NOTE — H&P ADULT - NSHPSOCIALHISTORY_GEN_ALL_CORE
Marital Status:  ( x )    (   ) Single    (   )    (  )   Occupation: retired  Lives with: (  ) alone  (  ) children   ( x ) spouse   (  ) parents  (  ) other    Substance Use (street drugs): (x  ) never used  (  ) other:  Tobacco Usage:  (   ) never smoked   ( x  ) former smoker   (   ) current smoker  (     ) pack year  (  Quit 3yrs ago ) last cigarette date  Alcohol Usage: none

## 2018-03-28 NOTE — H&P ADULT - ASSESSMENT
82Y M with pmh of nephrolithiasis s/p lithotripsy presents to the ED with inability to urinate for 1 day. Carrasco inserted in ED drained 500cc of urine and pt has positive UA.    Urinary retention with positive UA likely 2/2 prostate enlargement and retention  -Admit to medicine  -C/W tamsulosin   -start meropenem till urine cultures return and if negative abx can be stopped. Pt has no fevers or leucocytosis  -Keep carrasco  -f/u urology  -f/u blood and urine cultures    DVT ppx  -lovenox 40 q24h    GI ppx  -not needed    Dispo:  - pt is full code from home

## 2018-03-28 NOTE — ED PROVIDER NOTE - CARE PLAN
Principal Discharge DX:	Urinary incontinence  Goal:	carrasco placed  Assessment and plan of treatment:	place carrasco  urine culture  ua  cbc  bmp Principal Discharge DX:	Urinary incontinence  Goal:	carrasco placed  Assessment and plan of treatment:	carrasco placed in the ED 3/28/2018 by nurse   urine culture collected  ua+  Given meropenem x1   cbc  bmp Principal Discharge DX:	UTI (urinary tract infection)  Goal:	carrasco placed  Assessment and plan of treatment:	carrasco placed in the ED 3/28/2018 by nurse   urine culture collected  ua+  Given meropenem x1   cbc  bmp  Secondary Diagnosis:	Urinary retention  Secondary Diagnosis:	Drug resistance

## 2018-03-28 NOTE — H&P ADULT - HISTORY OF PRESENT ILLNESS
82Y M with pmh of nephrolithiasis presents to the ED with inability to urinate for 1 day. History goes back to 01/29/18 when pt has carrasco placed s/p lithotripsy procedure. He ended up keeping the carrasco till 02/07/18 when it was taken out at the urologist Dr Raymundo's office in [place]. The following day pt developed dysuria and came to [facility] where he was found to have MDR E. coli UTI. He was discharged on 82Y M with pmh of nephrolithiasis presents to the ED with inability to urinate for 1 day. History goes back to 01/29/18 when pt has carrasco placed s/p lithotripsy procedure. He ended up keeping the carrasco till 02/07/18 when it was taken out at the urologist Dr Raymundo's office in [place]. The following day pt developed dysuria and came to [facility] where he was found to have MDR E. coli UTI. He was discharged on Ceftin with the carrasco. Pt went to his urologist yesterday and had the carrasco removed. Since then he says he has had the urge to urinate but only a few drops of urine would come out. He has he tried straining but it did not make a difference which is why he came to the ED today. Pt had no fevers, nausea, vomiting or abdominal tenderness. 82Y M with pmh of nephrolithiasis s/p lithotripsy presents to the ED with inability to urinate for 1 day. History goes back to 01/29/18 when pt has carrasco placed s/p lithotripsy procedure. He ended up keeping the carrasco till 02/07/18 when it was taken out at the urologist Dr Raymundo's office in [place]. The following day pt developed dysuria and came to [facility] where he was found to have MDR E. coli UTI. He was discharged on Ceftin with the carrasco. Pt went to his urologist yesterday and had the carrasco removed. Since then he says he has had the urge to urinate but only a few drops of urine would come out. He has he tried straining but it did not make a difference which is why he came to the ED today. Pt had no fevers, nausea, vomiting or abdominal tenderness.

## 2018-03-28 NOTE — ED ADULT NURSE NOTE - OBJECTIVE STATEMENT
pt presents with difficulty urination after his chronic carrasco catheter was pulled out this morning

## 2018-03-28 NOTE — H&P ADULT - NSHPPHYSICALEXAM_GEN_ALL_CORE
T(F): 98.1 (03-28-18 @ 17:37), Max: 98.1 (03-28-18 @ 17:37)  HR: 89 (03-28-18 @ 17:37) (89 - 111)  BP: 109/71 (03-28-18 @ 17:37) (109/71 - 160/87)  RR: 20 (03-28-18 @ 17:37) (18 - 20)  SpO2: 99% (03-28-18 @ 17:37) (95% - 99%)  PHYSICAL EXAM:  GENERAL: NAD, speaks in full sentences, no signs of respiratory distress  HEAD:  Atraumatic, Normocephalic  EYES: EOMI, PERRLA, conjunctiva and sclera clear  NECK: Supple, No JVD  CHEST/LUNG: Clear to auscultation bilaterally; No wheeze; No crackles; No accessory muscles used  HEART: Regular rate and rhythm; No murmurs;   ABDOMEN: Soft, Nontender, Nondistended; Bowel sounds present; No guarding. Singh bag draining cloudy urine  EXTREMITIES:  2+ Peripheral Pulses, No cyanosis or edema  PSYCH: AAOx3  NEUROLOGY: non-focal  SKIN: No rashes or lesions

## 2018-03-29 LAB
ALBUMIN SERPL ELPH-MCNC: 3.8 G/DL — SIGNIFICANT CHANGE UP (ref 3.5–5.2)
ALP SERPL-CCNC: 44 U/L — SIGNIFICANT CHANGE UP (ref 30–115)
ALT FLD-CCNC: 9 U/L — SIGNIFICANT CHANGE UP (ref 0–41)
ANION GAP SERPL CALC-SCNC: 13 MMOL/L — SIGNIFICANT CHANGE UP (ref 7–14)
AST SERPL-CCNC: 11 U/L — SIGNIFICANT CHANGE UP (ref 0–41)
BASOPHILS # BLD AUTO: 0.02 K/UL — SIGNIFICANT CHANGE UP (ref 0–0.2)
BASOPHILS NFR BLD AUTO: 0.2 % — SIGNIFICANT CHANGE UP (ref 0–1)
BILIRUB SERPL-MCNC: 0.8 MG/DL — SIGNIFICANT CHANGE UP (ref 0.2–1.2)
BUN SERPL-MCNC: 16 MG/DL — SIGNIFICANT CHANGE UP (ref 10–20)
CALCIUM SERPL-MCNC: 8.6 MG/DL — SIGNIFICANT CHANGE UP (ref 8.5–10.1)
CHLORIDE SERPL-SCNC: 103 MMOL/L — SIGNIFICANT CHANGE UP (ref 98–110)
CO2 SERPL-SCNC: 25 MMOL/L — SIGNIFICANT CHANGE UP (ref 17–32)
CREAT SERPL-MCNC: 1 MG/DL — SIGNIFICANT CHANGE UP (ref 0.7–1.5)
EOSINOPHIL # BLD AUTO: 0.06 K/UL — SIGNIFICANT CHANGE UP (ref 0–0.7)
EOSINOPHIL NFR BLD AUTO: 0.7 % — SIGNIFICANT CHANGE UP (ref 0–8)
GLUCOSE SERPL-MCNC: 139 MG/DL — HIGH (ref 70–99)
HCT VFR BLD CALC: 44.2 % — SIGNIFICANT CHANGE UP (ref 42–52)
HGB BLD-MCNC: 14.7 G/DL — SIGNIFICANT CHANGE UP (ref 14–18)
IMM GRANULOCYTES NFR BLD AUTO: 0.3 % — SIGNIFICANT CHANGE UP (ref 0.1–0.3)
LYMPHOCYTES # BLD AUTO: 2.86 K/UL — SIGNIFICANT CHANGE UP (ref 1.2–3.4)
LYMPHOCYTES # BLD AUTO: 33.2 % — SIGNIFICANT CHANGE UP (ref 20.5–51.1)
MCHC RBC-ENTMCNC: 29.9 PG — SIGNIFICANT CHANGE UP (ref 27–31)
MCHC RBC-ENTMCNC: 33.3 G/DL — SIGNIFICANT CHANGE UP (ref 32–37)
MCV RBC AUTO: 90 FL — SIGNIFICANT CHANGE UP (ref 80–94)
MONOCYTES # BLD AUTO: 0.82 K/UL — HIGH (ref 0.1–0.6)
MONOCYTES NFR BLD AUTO: 9.5 % — HIGH (ref 1.7–9.3)
NEUTROPHILS # BLD AUTO: 4.83 K/UL — SIGNIFICANT CHANGE UP (ref 1.4–6.5)
NEUTROPHILS NFR BLD AUTO: 56.1 % — SIGNIFICANT CHANGE UP (ref 42.2–75.2)
PLATELET # BLD AUTO: 175 K/UL — SIGNIFICANT CHANGE UP (ref 130–400)
POTASSIUM SERPL-MCNC: 4.2 MMOL/L — SIGNIFICANT CHANGE UP (ref 3.5–5)
POTASSIUM SERPL-SCNC: 4.2 MMOL/L — SIGNIFICANT CHANGE UP (ref 3.5–5)
PROT SERPL-MCNC: 6.7 G/DL — SIGNIFICANT CHANGE UP (ref 6–8)
RBC # BLD: 4.91 M/UL — SIGNIFICANT CHANGE UP (ref 4.7–6.1)
RBC # FLD: 14.6 % — HIGH (ref 11.5–14.5)
SODIUM SERPL-SCNC: 141 MMOL/L — SIGNIFICANT CHANGE UP (ref 135–146)
WBC # BLD: 8.62 K/UL — SIGNIFICANT CHANGE UP (ref 4.8–10.8)
WBC # FLD AUTO: 8.62 K/UL — SIGNIFICANT CHANGE UP (ref 4.8–10.8)

## 2018-03-29 RX ADMIN — MEROPENEM 100 MILLIGRAM(S): 1 INJECTION INTRAVENOUS at 22:19

## 2018-03-29 RX ADMIN — MEROPENEM 100 MILLIGRAM(S): 1 INJECTION INTRAVENOUS at 06:40

## 2018-03-29 RX ADMIN — TAMSULOSIN HYDROCHLORIDE 0.4 MILLIGRAM(S): 0.4 CAPSULE ORAL at 22:19

## 2018-03-29 RX ADMIN — ENOXAPARIN SODIUM 40 MILLIGRAM(S): 100 INJECTION SUBCUTANEOUS at 22:19

## 2018-03-29 RX ADMIN — MEROPENEM 100 MILLIGRAM(S): 1 INJECTION INTRAVENOUS at 14:54

## 2018-03-29 NOTE — CONSULT NOTE ADULT - SUBJECTIVE AND OBJECTIVE BOX
82y    Patient is a 82y old  Male who presents with a chief complaint of Urinary retention (28 Mar 2018 17:54)      HPI:  82Y M with pmh of nephrolithiasis s/p lithotripsy presents to the ED with inability to urinate for 1 day. History goes back to 18 when pt has carrasco placed s/p lithotripsy procedure. He ended up keeping the carrasco till 18 when it was taken out at the urologist Dr Raymundo's office in [place]. The following day pt developed dysuria and came to [facility] where he was found to have MDR E. coli UTI. He was discharged on Ceftin with the carrasco. Pt went to his urologist yesterday and had the carrasco removed. Since then he says he has had the urge to urinate but only a few drops of urine would come out. He has he tried straining but it did not make a difference which is why he came to the ED today. Pt had no fevers, nausea, vomiting or abdominal tenderness. (28 Mar 2018 17:54)      PAST MEDICAL & SURGICAL HISTORY:  Urinary tract infection, E. coli  Kidney stones  H/O lithotripsy      Home Medications:  pt unsure of home medication    Allergies    No Known Allergies              Vital Signs Last 24 Hrs    T(F): 95.6 (29 Mar 2018 05:30), Max: 98.1 (28 Mar 2018 17:37)  HR: 75 (29 Mar 2018 05:30) (75 - 111)  BP: 139/62 (29 Mar 2018 05:30) (109/71 - 160/87)    RR: 18 (29 Mar 2018 05:30) (18 - 20)  SpO2: 99% (28 Mar 2018 17:37) (95% - 99%)      PHYSICAL EXAM:      Constitutional:    Eyes:    ENMT:    Neck:    Breasts:    Back:    Respiratory:    Cardiovascular:    Gastrointestinal:    Genitourinary:    Rectal:    Extremities:    Vascular:    Neurological:    Skin:    Lymph Nodes:    Musculoskeletal:    Psychiatric:                          14.1   10.77 )-----------( 184      ( 28 Mar 2018 14:40 )             42.0     138  |  103  |  18  ----------------------------<  152<H>  4.3   |  21  |  1.0    Ca    8.7      28 Mar 2018 14:40      Urinalysis Basic - ( 28 Mar 2018 14:40 )    Color: Yellow / Appearance: Turbid / S.015 / pH: x  Gluc: x / Ketone: Negative  / Bili: Negative / Urobili: 0.2 mg/dL   Blood: x / Protein: 100 mg/dL / Nitrite: Positive   Leuk Esterase: Large / RBC: x / WBC >50 /HPF   Sq Epi: x / Non Sq Epi: x / Bacteria: Moderate /HPF    MEDICATIONS  (STANDING):  enoxaparin Injectable 40 milliGRAM(s) SubCutaneous every 24 hours  meropenem  IVPB 1000 milliGRAM(s) IV Intermittent every 8 hours  tamsulosin Oral Tab/Cap - Peds 0.4 milliGRAM(s) Oral at bedtime    MEDICATIONS  (PRN): Patient is a 82y old  Male who presents with a chief complaint of Urinary retention (28 Mar 2018 17:54)      HPI:  82Y M with pmh of nephrolithiasis s/p lithotripsy presents to the ED with inability to urinate for 1 day. History goes back to 18 when pt has carrasco placed s/p lithotripsy procedure. He ended up keeping the carrasco till 18 when it was taken out at the urologist Dr Raymunod's office in [place]. The following day pt developed dysuria and came to [facility] where he was found to have MDR E. coli UTI. He was discharged on Ceftin with the carrasco. Pt went to his urologist yesterday and had the carrasco removed. Since then he says he has had the urge to urinate but only a few drops of urine would come out. He has he tried straining but it did not make a difference which is why he came to the ED today. Pt had no fevers, nausea, vomiting or abdominal tenderness. (28 Mar 2018 17:54)      PAST MEDICAL & SURGICAL HISTORY:  Urinary tract infection, E. coli  Kidney stones  H/O lithotripsy      Home Medications:  pt unsure of home medication    Allergies    No Known Allergies        Vital Signs Last 24 Hrs    T(F): 95.6 (29 Mar 2018 05:30), Max: 98.1 (28 Mar 2018 17:37)  HR: 75 (29 Mar 2018 05:30) (75 - 111)  BP: 139/62 (29 Mar 2018 05:30) (109/71 - 160/87)    RR: 18 (29 Mar 2018 05:30) (18 - 20)  SpO2: 99% (28 Mar 2018 17:37) (95% - 99%)      PHYSICAL EXAM:      Constitutional: aa xo x 3, pt statesd "he feels good"    Eyes: perrl, eomi    ENMT: no facial droop    Neck: FROM    Back: no cva tenderness]    Respiratory: cta b/l    Cardiovascular: s1, s2    Gastrointestinal: abd- soft nontender, nondistended, no suprapubic tenderness    Genitourinary: normal male circumcised anatomy, Carrasco in place, no weeping, or bleeding from meatus, scrotum - nondistended, nontender testicles x 2 ; urine 350ml cloudy     Rectal: declined    Extremities: SHEETS x4                          14.1   10.77 )-----------( 184      ( 28 Mar 2018 14:40 )             42.0     138  |  103  |  18  ----------------------------<  152<H>  4.3   |  21  |  1.0    Ca    8.7      28 Mar 2018 14:40      Urinalysis Basic - ( 28 Mar 2018 14:40 )    Color: Yellow / Appearance: Turbid / S.015 / pH: x  Gluc: x / Ketone: Negative  / Bili: Negative / Urobili: 0.2 mg/dL   Blood: x / Protein: 100 mg/dL / Nitrite: Positive   Leuk Esterase: Large / RBC: x / WBC >50 /HPF   Sq Epi: x / Non Sq Epi: x / Bacteria: Moderate /HPF    MEDICATIONS  (STANDING):  enoxaparin Injectable 40 milliGRAM(s) SubCutaneous every 24 hours  meropenem  IVPB 1000 milliGRAM(s) IV Intermittent every 8 hours  tamsulosin Oral Tab/Cap - Peds 0.4 milliGRAM(s) Oral at bedtime    MEDICATIONS  (PRN):

## 2018-03-29 NOTE — CONSULT NOTE ADULT - ASSESSMENT
a/p as above urinary retention 2/2 to uti    continue iv antibiotics    i d consult for meropenum    remove catheter after uti resolved and patient able to ambulate 150 feet    TOV, if fails, replace carrasco , f/u out pt with own urologist    attn to see and sign a/p as above urinary retention 2/2 to uti    continue iv antibiotics    i d consult for meropenum    remove catheter after uti resolved and patient able to ambulate 150 feet    TOV, if fails, replace carrasco , f/u out pt with own urologist  urologist Dr Raymundo's office in [facility] to see and sign

## 2018-03-30 RX ORDER — MEROPENEM 1 G/30ML
500 INJECTION INTRAVENOUS EVERY 8 HOURS
Qty: 0 | Refills: 0 | Status: DISCONTINUED | OUTPATIENT
Start: 2018-03-30 | End: 2018-03-30

## 2018-03-30 RX ORDER — MEROPENEM 1 G/30ML
500 INJECTION INTRAVENOUS EVERY 6 HOURS
Qty: 0 | Refills: 0 | Status: DISCONTINUED | OUTPATIENT
Start: 2018-03-30 | End: 2018-03-31

## 2018-03-30 RX ORDER — MEROPENEM 1 G/30ML
INJECTION INTRAVENOUS
Qty: 0 | Refills: 0 | Status: DISCONTINUED | OUTPATIENT
Start: 2018-03-30 | End: 2018-03-31

## 2018-03-30 RX ORDER — MEROPENEM 1 G/30ML
500 INJECTION INTRAVENOUS ONCE
Qty: 0 | Refills: 0 | Status: COMPLETED | OUTPATIENT
Start: 2018-03-30 | End: 2018-03-30

## 2018-03-30 RX ADMIN — MEROPENEM 100 MILLIGRAM(S): 1 INJECTION INTRAVENOUS at 23:16

## 2018-03-30 RX ADMIN — MEROPENEM 100 MILLIGRAM(S): 1 INJECTION INTRAVENOUS at 05:20

## 2018-03-30 RX ADMIN — MEROPENEM 100 MILLIGRAM(S): 1 INJECTION INTRAVENOUS at 13:49

## 2018-03-30 RX ADMIN — TAMSULOSIN HYDROCHLORIDE 0.4 MILLIGRAM(S): 0.4 CAPSULE ORAL at 22:45

## 2018-03-30 RX ADMIN — MEROPENEM 100 MILLIGRAM(S): 1 INJECTION INTRAVENOUS at 17:54

## 2018-03-30 RX ADMIN — ENOXAPARIN SODIUM 40 MILLIGRAM(S): 100 INJECTION SUBCUTANEOUS at 22:45

## 2018-03-30 NOTE — CONSULT NOTE ADULT - ASSESSMENT
retention with asymptomatic bacteruria secondary to E. coli.  No evidence of pyelonephritis/cystis/prostatitis.  See no need for long term antibiotics.   For now Meropenem 500mg q6h.

## 2018-03-30 NOTE — PROGRESS NOTE ADULT - ATTENDING COMMENTS
82Y M with pmh of nephrolithiasis s/p lithotripsy presents to the ED with inability to urinate for 1 day. Carrasco inserted in ED drained 500cc of urine and pt has positive UA.    1) Urinary retention : Probably from having carrasco for so long ? Relieved for now with carrasco.   F/u Urology for any further urodynamic testing and interventions ?     2) h/o recent ESBL E Coli : Apparently had completed the treatment recently hence not sure what to make of the positive UA this time.   Has no e/o fever/dysuria/flank pain. ID agrees this may be asymptomatic bacteriuria. f/u cultures, f/u ID. May not need long term Abx.     3) h/o BPH : On flomax     Will f/u

## 2018-03-30 NOTE — CONSULT NOTE ADULT - SUBJECTIVE AND OBJECTIVE BOX
LOIS KANG  82y, Male  Allergy: No Known Allergies      HPI:  82Y M with pmh of nephrolithiasis s/p lithotripsy presents to the ED with inability to urinate for 1 day. History goes back to 18 when pt has carrasco placed s/p lithotripsy procedure. He ended up keeping the carrasco till 18 when it was taken out at the urologist Dr Raymundo's office in [place]. The following day pt developed dysuria and came to [facility] where he was found to have MDR E. coli UTI. He was discharged on Ceftin with the carrasco. Pt went to his urologist yesterday and had the carrasco removed. Since then he says he has had the urge to urinate but only a few drops of urine would come out. He has he tried straining but it did not make a difference which is why he came to the ED today. Pt had no fevers, nausea, vomiting or abdominal tenderness. (28 Mar 2018 17:54)    FAMILY HISTORY:  No pertinent family history in first degree relatives    PAST MEDICAL & SURGICAL HISTORY:  Urinary tract infection, E. coli  Kidney stones  H/O lithotripsy        VITALS:  T(F): 96, Max: 97 (18 @ 21:47)  HR: 80  BP: 114/68  RR: 18Vital Signs Last 24 Hrs  T(C): 35.6 (30 Mar 2018 05:45), Max: 36.1 (29 Mar 2018 21:47)  T(F): 96 (30 Mar 2018 05:45), Max: 97 (29 Mar 2018 21:47)  HR: 80 (30 Mar 2018 05:45) (80 - 91)  BP: 114/68 (30 Mar 2018 05:45) (114/68 - 122/68)  BP(mean): --  RR: 18 (30 Mar 2018 05:45) (18 - 18)  SpO2: --    TESTS & MEASUREMENTS:                        14.7   8.62  )-----------( 175      ( 29 Mar 2018 10:53 )             44.2         141  |  103  |  16  ----------------------------<  139<H>  4.2   |  25  |  1.0    Ca    8.6      29 Mar 2018 10:53    TPro  6.7  /  Alb  3.8  /  TBili  0.8  /  DBili  x   /  AST  11  /  ALT  9   /  AlkPhos  44      LIVER FUNCTIONS - ( 29 Mar 2018 10:53 )  Alb: 3.8 g/dL / Pro: 6.7 g/dL / ALK PHOS: 44 U/L / ALT: 9 U/L / AST: 11 U/L / GGT: x             Culture - Urine (collected 18 @ 15:30)  Source: .Urine Catheterized  Preliminary Report (18 @ 18:27):    >100,000 CFU/ml Escherichia coli      Urinalysis Basic - ( 28 Mar 2018 14:40 )    Color: Yellow / Appearance: Turbid / S.015 / pH: x  Gluc: x / Ketone: Negative  / Bili: Negative / Urobili: 0.2 mg/dL   Blood: x / Protein: 100 mg/dL / Nitrite: Positive   Leuk Esterase: Large / RBC: x / WBC >50 /HPF   Sq Epi: x / Non Sq Epi: x / Bacteria: Moderate /HPF          RADIOLOGY & ADDITIONAL TESTS:    ANTIBIOTICS:  meropenem  IVPB 1000 milliGRAM(s) IV Intermittent every 8 hours

## 2018-03-31 DIAGNOSIS — N39.0 URINARY TRACT INFECTION, SITE NOT SPECIFIED: ICD-10-CM

## 2018-03-31 DIAGNOSIS — R33.9 RETENTION OF URINE, UNSPECIFIED: ICD-10-CM

## 2018-03-31 LAB
ANION GAP SERPL CALC-SCNC: 12 MMOL/L — SIGNIFICANT CHANGE UP (ref 7–14)
BASOPHILS # BLD AUTO: 0.04 K/UL — SIGNIFICANT CHANGE UP (ref 0–0.2)
BASOPHILS NFR BLD AUTO: 0.5 % — SIGNIFICANT CHANGE UP (ref 0–1)
BUN SERPL-MCNC: 21 MG/DL — HIGH (ref 10–20)
CALCIUM SERPL-MCNC: 8.1 MG/DL — LOW (ref 8.5–10.1)
CHLORIDE SERPL-SCNC: 102 MMOL/L — SIGNIFICANT CHANGE UP (ref 98–110)
CO2 SERPL-SCNC: 24 MMOL/L — SIGNIFICANT CHANGE UP (ref 17–32)
CREAT SERPL-MCNC: 0.9 MG/DL — SIGNIFICANT CHANGE UP (ref 0.7–1.5)
EOSINOPHIL # BLD AUTO: 0.11 K/UL — SIGNIFICANT CHANGE UP (ref 0–0.7)
EOSINOPHIL NFR BLD AUTO: 1.5 % — SIGNIFICANT CHANGE UP (ref 0–8)
GLUCOSE SERPL-MCNC: 191 MG/DL — HIGH (ref 70–99)
HCT VFR BLD CALC: 43.2 % — SIGNIFICANT CHANGE UP (ref 42–52)
HGB BLD-MCNC: 14.4 G/DL — SIGNIFICANT CHANGE UP (ref 14–18)
IMM GRANULOCYTES NFR BLD AUTO: 0.4 % — HIGH (ref 0.1–0.3)
LYMPHOCYTES # BLD AUTO: 2.34 K/UL — SIGNIFICANT CHANGE UP (ref 1.2–3.4)
LYMPHOCYTES # BLD AUTO: 31.4 % — SIGNIFICANT CHANGE UP (ref 20.5–51.1)
MCHC RBC-ENTMCNC: 29.9 PG — SIGNIFICANT CHANGE UP (ref 27–31)
MCHC RBC-ENTMCNC: 33.3 G/DL — SIGNIFICANT CHANGE UP (ref 32–37)
MCV RBC AUTO: 89.6 FL — SIGNIFICANT CHANGE UP (ref 80–94)
MONOCYTES # BLD AUTO: 0.78 K/UL — HIGH (ref 0.1–0.6)
MONOCYTES NFR BLD AUTO: 10.5 % — HIGH (ref 1.7–9.3)
NEUTROPHILS # BLD AUTO: 4.16 K/UL — SIGNIFICANT CHANGE UP (ref 1.4–6.5)
NEUTROPHILS NFR BLD AUTO: 55.7 % — SIGNIFICANT CHANGE UP (ref 42.2–75.2)
PLATELET # BLD AUTO: 180 K/UL — SIGNIFICANT CHANGE UP (ref 130–400)
POTASSIUM SERPL-MCNC: 4.1 MMOL/L — SIGNIFICANT CHANGE UP (ref 3.5–5)
POTASSIUM SERPL-SCNC: 4.1 MMOL/L — SIGNIFICANT CHANGE UP (ref 3.5–5)
RBC # BLD: 4.82 M/UL — SIGNIFICANT CHANGE UP (ref 4.7–6.1)
RBC # FLD: 14.3 % — SIGNIFICANT CHANGE UP (ref 11.5–14.5)
SODIUM SERPL-SCNC: 138 MMOL/L — SIGNIFICANT CHANGE UP (ref 135–146)
WBC # BLD: 7.46 K/UL — SIGNIFICANT CHANGE UP (ref 4.8–10.8)
WBC # FLD AUTO: 7.46 K/UL — SIGNIFICANT CHANGE UP (ref 4.8–10.8)

## 2018-03-31 RX ORDER — NITROFURANTOIN MACROCRYSTAL 50 MG
50 CAPSULE ORAL
Qty: 0 | Refills: 0 | Status: DISCONTINUED | OUTPATIENT
Start: 2018-03-31 | End: 2018-04-01

## 2018-03-31 RX ORDER — TAMSULOSIN HYDROCHLORIDE 0.4 MG/1
0.8 CAPSULE ORAL AT BEDTIME
Qty: 0 | Refills: 0 | Status: DISCONTINUED | OUTPATIENT
Start: 2018-03-31 | End: 2018-04-01

## 2018-03-31 RX ADMIN — TAMSULOSIN HYDROCHLORIDE 0.8 MILLIGRAM(S): 0.4 CAPSULE ORAL at 21:40

## 2018-03-31 RX ADMIN — ENOXAPARIN SODIUM 40 MILLIGRAM(S): 100 INJECTION SUBCUTANEOUS at 21:44

## 2018-03-31 RX ADMIN — MEROPENEM 100 MILLIGRAM(S): 1 INJECTION INTRAVENOUS at 11:44

## 2018-03-31 RX ADMIN — MEROPENEM 100 MILLIGRAM(S): 1 INJECTION INTRAVENOUS at 17:16

## 2018-03-31 RX ADMIN — MEROPENEM 100 MILLIGRAM(S): 1 INJECTION INTRAVENOUS at 05:04

## 2018-03-31 RX ADMIN — TAMSULOSIN HYDROCHLORIDE 0.4 MILLIGRAM(S): 0.4 CAPSULE ORAL at 21:40

## 2018-03-31 NOTE — PROGRESS NOTE ADULT - SUBJECTIVE AND OBJECTIVE BOX
Patient comfortable.    Abdomen:  Soft  :  carrasco drainage clear yellow
S : No new complaints       All other pertinent ROS negative.      03-30-18 @ 07:01  -  03-31-18 @ 07:00  --------------------------------------------------------  IN: 0 mL / OUT: 800 mL / NET: -800 mL      Vital Signs Last 24 Hrs  T(C): 36.2 (31 Mar 2018 14:12), Max: 36.5 (31 Mar 2018 06:38)  T(F): 97.2 (31 Mar 2018 14:12), Max: 97.7 (31 Mar 2018 06:38)  HR: 83 (31 Mar 2018 14:12) (79 - 84)  BP: 118/71 (31 Mar 2018 14:12) (118/71 - 166/83)  BP(mean): --  RR: 16 (31 Mar 2018 14:12) (16 - 18)  SpO2: 95% (31 Mar 2018 11:58) (95% - 95%)  PHYSICAL EXAM:    Constitutional: NAD, awake and alert, well-developed  HEENT: PERR, EOMI, Normal Hearing, MMM  Neck: Soft and supple, No LAD, No JVD  Respiratory: Breath sounds are clear bilaterally, No wheezing, rales or rhonchi  Cardiovascular: S1 and S2, regular rate and rhythm, no Murmurs, gallops or rubs  Gastrointestinal: Bowel Sounds present, soft, nontender, nondistended, no guarding, no rebound  Extremities: No peripheral edema  Vascular: 2+ peripheral pulses  Neurological: A/O x 3, no focal deficits  Musculoskeletal: 5/5 strength b/l upper and lower extremities  Skin: No rashes    MEDICATIONS:  MEDICATIONS  (STANDING):  enoxaparin Injectable 40 milliGRAM(s) SubCutaneous every 24 hours  nitrofurantoin (MACROBID) 50 milliGRAM(s) Oral two times a day with meals  tamsulosin 0.8 milliGRAM(s) Oral at bedtime  tamsulosin Oral Tab/Cap - Peds 0.4 milliGRAM(s) Oral at bedtime      LABS: All Labs Reviewed:                        14.4   7.46  )-----------( 180      ( 31 Mar 2018 09:30 )             43.2     03-31    138  |  102  |  21<H>  ----------------------------<  191<H>  4.1   |  24  |  0.9    Ca    8.1<L>      31 Mar 2018 09:30            Blood Culture: 03-29 @ 10:53  Organism --  Gram Stain Blood -- Gram Stain --  Specimen Source .Blood None  Culture-Blood --    03-28 @ 15:30  Organism Escherichia coli ESBL  Gram Stain Blood -- Gram Stain --  Specimen Source .Urine Catheterized  Culture-Blood --        Radiology: reviewed
 Progress Note    Pt seen and examined at bedside.  No complaints at this time.  Resting comfortably in bed.    Vital signs  T(C): , Max: 36.1 (03-29-18 @ 21:47)  HR: 80 (03-30-18 @ 05:45)  BP: 114/68 (03-30-18 @ 05:45)  SpO2: --    Gen A&OX3 NAD  Abd soft, NTTP, carrasco in place draining yellow urine.                          14.7   8.62  )-----------( 175      ( 29 Mar 2018 10:53 )             44.2     29 Mar 2018 10:53    141    |  103    |  16     ----------------------------<  139    4.2     |  25     |  1.0      Ca    8.6        29 Mar 2018 10:53        Urine Cx: Culture - Urine (03.28.18 @ 15:30)    -  Amikacin: S <=8    -  Amoxicillin/Clavulanic Acid: I 16/8    -  Ampicillin: R >16    -  Ampicillin/Sulbactam: R 16/8    -  Aztreonam: R <=4    -  Cefazolin: R >16    -  Cefepime: R <=2    -  Cefoxitin: S <=4    -  Ceftriaxone: R 32    -  Ciprofloxacin: R >2    -  Ertapenem: S <=0.5    -  Gentamicin: S <=1    -  Imipenem: S <=1    -  Levofloxacin: R >4    -  Meropenem: S <=1    -  Nitrofurantoin: S <=32    -  Piperacillin/Tazobactam: R <=8    -  Tigecycline: S <=1    -  Tobramycin: R >8    -  Trimethoprim/Sulfamethoxazole: R >2/38    Specimen Source: .Urine Catheterized    Culture Results:   >100,000 CFU/ml Escherichia coli ESBL    Organism Identification: Escherichia coli ESBL    Organism: Escherichia coli ESBL    Method Type: HERMILO      Imaging
AGUSTIN KANGGENIY  82y Male 1935    CHIEF COMPLAINT:  Patient is a 82y old  Male who presents with a chief complaint of Urinary retention (28 Mar 2018 17:54)        HISTORY OF PRESENT ILLNESS:  82M with pmhx of nephrolithiasis presents from home with Urinary retention. Patient had lithotripsy on  and experienced urinary retention shortly afterwards. Was discharged with carrasco cath and subsequently removed. On  Pt complained of dysuria and was found to have MDR E Coli and sent home on ceftin and carrasco was placed again for urinary retention. Pt we seen by out pt Uro where the carrasco was revolved 1 day ptp, and again patient was having difficulty urinating and so came to hospital. Carrasco placed in ED drained 500cc of urine (no blood) and UA was grossly positive.       INTERVAL HPI/OVERNIGHT EVENTS:  No events overnight. Patient feels well with the carrasco in place     [place]  ([place]): [id number]    REVIEW OF SYSTEMS:    CONSTITUTIONAL: No weakness, fevers or chills  EYES/ENT: No visual changes;  No vertigo or throat pain   NECK: No pain or stiffness  RESPIRATORY: No cough, wheezing, hemoptysis; No shortness of breath  CARDIOVASCULAR: No chest pain or palpitations  GASTROINTESTINAL: No abdominal or epigastric pain. No nausea, vomiting, or hematemesis; No diarrhea or constipation. No melena or hematochezia.  GENITOURINARY: No dysuria, frequency or hematuria  NEUROLOGICAL: No numbness or weakness  SKIN: No itching, rashes        PHYSICAL EXAM:  T(F): 95.6 (18 @ 05:30), Max: 98.1 (18 @ 17:37)  HR: 75 (18 @ 05:30) (75 - 89)  BP: 139/62 (18 @ 05:30) (109/71 - 139/62)  RR: 18 (18 @ 05:30) (18 - 20)  SpO2: 99% (18 @ 17:37) (99% - 99%)    PHYSICAL EXAM:  GENERAL: NAD, well-developed  HEAD:  Atraumatic, Normocephalic  EYES: EOMI, PERRLA, conjunctiva and sclera clear  CHEST/LUNG: Clear to auscultation bilaterally; No wheeze  HEART: Regular rate and rhythm; No murmurs, rubs, or gallops  ABDOMEN: Soft, Nontender, Nondistended; Bowel sounds present  EXTREMITIES:  2+ Peripheral Pulses, No clubbing, cyanosis, or edema  PSYCH: AAOx3  NEUROLOGY: non-focal  SKIN: No rashes or lesions      LABS:                          14.7   8.62  )-----------( 175      ( 29 Mar 2018 10:53 )             44.2           141  |  103  |  16  ----------------------------<  139<H>  4.2   |  25  |  1.0    Ca    8.6      29 Mar 2018 10:53    TPro  6.7  /  Alb  3.8  /  TBili  0.8  /  DBili  x   /  AST  11  /  ALT  9   /  AlkPhos  44                Urinalysis Basic - ( 28 Mar 2018 14:40 )    Color: Yellow / Appearance: Turbid / S.015 / pH: x  Gluc: x / Ketone: Negative  / Bili: Negative / Urobili: 0.2 mg/dL   Blood: x / Protein: 100 mg/dL / Nitrite: Positive   Leuk Esterase: Large / RBC: x / WBC >50 /HPF   Sq Epi: x / Non Sq Epi: x / Bacteria: Moderate /HPF            Lactate Trend   @ 14:40 Lactate:1.1     RADIOLOGY & ADDITIONAL TESTS:        CURRENT HOSPITAL MEDICATIONS:  enoxaparin Injectable 40 milliGRAM(s) SubCutaneous every 24 hours  meropenem  IVPB 1000 milliGRAM(s) IV Intermittent every 8 hours  tamsulosin Oral Tab/Cap - Peds 0.4 milliGRAM(s) Oral at bedtime        ALLERGIES:  No Known Allergies
LOIS KANG  82y Male 1935    CHIEF COMPLAINT:  Patient is a 82y old  Male who presents with a chief complaint of Urinary retention (28 Mar 2018 17:54)        HISTORY OF PRESENT ILLNESS:  82M with pmhx of nephrolithiasis presents from home with Urinary retention. Patient had lithotripsy on  and experienced urinary retention shortly afterwards. Was discharged with carrasco cath and subsequently removed. On  Pt complained of dysuria and was found to have MDR E Coli and sent home on ceftin and carrasco was placed again for urinary retention. Pt we seen by out pt Uro where the carrasco was revolved 1 day ptp, and again patient was having difficulty urinating and so came to hospital. Carrasco placed in ED drained 500cc of urine (no blood) and UA was grossly positive.       INTERVAL HPI/OVERNIGHT EVENTS:  No complaints or events over night     REVIEW OF SYSTEMS:    CONSTITUTIONAL: No weakness, fevers or chills  EYES/ENT: No visual changes;  No vertigo or throat pain   NECK: No pain or stiffness  RESPIRATORY: No cough, wheezing, hemoptysis; No shortness of breath  CARDIOVASCULAR: No chest pain or palpitations  GASTROINTESTINAL: No abdominal or epigastric pain. No nausea, vomiting, or hematemesis; No diarrhea or constipation. No melena or hematochezia.  GENITOURINARY: No dysuria, frequency or hematuria  NEUROLOGICAL: No numbness or weakness  SKIN: No itching, rashes        PHYSICAL EXAM:  Vital Signs Last 24 Hrs  T(C): 35.6 (30 Mar 2018 05:45), Max: 36.1 (29 Mar 2018 21:47)  T(F): 96 (30 Mar 2018 05:45), Max: 97 (29 Mar 2018 21:47)  HR: 80 (30 Mar 2018 05:45) (80 - 86)  BP: 114/68 (30 Mar 2018 05:45) (114/68 - 122/68)  RR: 18 (30 Mar 2018 05:45) (18 - 18)        PHYSICAL EXAM:  GENERAL: NAD, well-developed  HEAD:  Atraumatic, Normocephalic  EYES: EOMI, PERRLA, conjunctiva and sclera clear  CHEST/LUNG: Clear to auscultation bilaterally; No wheeze  HEART: Regular rate and rhythm; No murmurs, rubs, or gallops  ABDOMEN: Soft, Nontender, Nondistended; Bowel sounds present  EXTREMITIES:  2+ Peripheral Pulses, No clubbing, cyanosis, or edema  PSYCH: AAOx3  NEUROLOGY: non-focal  SKIN: No rashes or lesions      LABS: No new labs today                        14.7   8.62  )-----------( 175      ( 29 Mar 2018 10:53 )             44.2       141  |  103  |  16  ----------------------------<  139<H>  4.2   |  25  |  1.0    Ca    8.6      29 Mar 2018 10:53    TPro  6.7  /  Alb  3.8  /  TBili  0.8  /  DBili  x   /  AST  11  /  ALT  9   /  AlkPhos  44      Urinalysis Basic - ( 28 Mar 2018 14:40 )    Color: Yellow / Appearance: Turbid / S.015 / pH: x  Gluc: x / Ketone: Negative  / Bili: Negative / Urobili: 0.2 mg/dL   Blood: x / Protein: 100 mg/dL / Nitrite: Positive   Leuk Esterase: Large / RBC: x / WBC >50 /HPF   Sq Epi: x / Non Sq Epi: x / Bacteria: Moderate /HPF    Lactate Trend   @ 14:40 Lactate:1.1     RADIOLOGY & ADDITIONAL TESTS:        CURRENT HOSPITAL MEDICATIONS:  enoxaparin Injectable 40 milliGRAM(s) SubCutaneous every 24 hours  meropenem  IVPB 1000 milliGRAM(s) IV Intermittent every 6 hours  tamsulosin Oral Tab/Cap - Peds 0.4 milliGRAM(s) Oral at bedtime        ALLERGIES:  No Known Allergies
LOIS KANG  82y, Male      OVERNIGHT EVENTS:    none    VITALS:  T(F): 97.7, Max: 97.7 (03-31-18 @ 06:38)  HR: 79  BP: 136/66  RR: 18Vital Signs Last 24 Hrs  T(C): 36.5 (31 Mar 2018 06:38), Max: 36.5 (31 Mar 2018 06:38)  T(F): 97.7 (31 Mar 2018 06:38), Max: 97.7 (31 Mar 2018 06:38)  HR: 79 (31 Mar 2018 06:38) (79 - 84)  BP: 136/66 (31 Mar 2018 06:38) (136/66 - 166/83)  BP(mean): --  RR: 18 (31 Mar 2018 06:38) (18 - 18)  SpO2: --    TESTS & MEASUREMENTS:                        14.4   7.46  )-----------( 180      ( 31 Mar 2018 09:30 )             43.2     03-29    141  |  103  |  16  ----------------------------<  139<H>  4.2   |  25  |  1.0    Ca    8.6      29 Mar 2018 10:53    TPro  6.7  /  Alb  3.8  /  TBili  0.8  /  DBili  x   /  AST  11  /  ALT  9   /  AlkPhos  44  03-29    LIVER FUNCTIONS - ( 29 Mar 2018 10:53 )  Alb: 3.8 g/dL / Pro: 6.7 g/dL / ALK PHOS: 44 U/L / ALT: 9 U/L / AST: 11 U/L / GGT: x             Culture - Blood (collected 03-29-18 @ 10:53)  Source: .Blood None  Preliminary Report (03-30-18 @ 15:01):    No growth to date.    Culture - Urine (collected 03-28-18 @ 15:30)  Source: .Urine Catheterized  Final Report (03-30-18 @ 17:51):    >100,000 CFU/ml Escherichia coli ESBL  Organism: Escherichia coli ESBL (03-30-18 @ 17:51)  Organism: Escherichia coli ESBL (03-30-18 @ 17:51)      -  Amikacin: S <=8      -  Amoxicillin/Clavulanic Acid: I 16/8      -  Ampicillin: R >16      -  Ampicillin/Sulbactam: R 16/8      -  Aztreonam: R <=4      -  Cefazolin: R >16      -  Cefepime: R <=2      -  Cefoxitin: S <=4      -  Ceftriaxone: R 32      -  Ciprofloxacin: R >2      -  Ertapenem: S <=0.5      -  Gentamicin: S <=1      -  Imipenem: S <=1      -  Levofloxacin: R >4      -  Meropenem: S <=1      -  Nitrofurantoin: S <=32      -  Piperacillin/Tazobactam: R <=8      -  Tigecycline: S <=1      -  Tobramycin: R >8      -  Trimethoprim/Sulfamethoxazole: R >2/38      Method Type: HERMILO            RADIOLOGY & ADDITIONAL TESTS:    ANTIBIOTICS:  meropenem  IVPB      meropenem  IVPB 500 milliGRAM(s) IV Intermittent every 6 hours

## 2018-03-31 NOTE — PROGRESS NOTE ADULT - ASSESSMENT
82Y M with pmh of nephrolithiasis s/p lithotripsy presents to the ED with inability to urinate for 1 day. Carrasco inserted in ED drained 500cc of urine and pt has positive UA.    1) Urinary retention : Probably from having carrasco for so long ? Relieved for now with carrasco.   F/u Urology for any further urodynamic testing and interventions ?  D/c with carrasco. Outpatient TOV at Dr Horn's office within a week     2) h/o recent ESBL E Coli : D/c tomorrow on PO Nitrofuranoin.   Please prepare all his D/c papers. (Pharmacy is [address])      3) h/o BPH : On flomax     Will f/u .
82M with pmhx of nephrolithiasis s/p lithotripsy and urinary retention presents for urinary retention s/p carrasco removal 1 day ptp. Found to have grossly positive UA after carrasco was replaced, 500cc drained initially.     Urinary retention: UTI vs Urethral stricture vs BPH   Uro c/s: Spoke with Uro team today. Attending to come and see. Given history of repeated carrasco is less likely the retention is 2/2 to only a UTI. PA feels patient will likely leaving with carrasco in and follow up with out patient studies.   Patient would like to follow with [facility] Uro team instead of his current urologist    Continue with flomax    UTI:   UA grossly positive  Ur Cx: E Coli but no sensitivities yet   Currently on Mack due to hx of MDR E. Coli in 2/2018  ID c/s: Continue with mack q6, follow up cultures. No evidence of cystitis, or pyelo    GI ppx: not indicated  DVT PPX: lovenox  Full code  Reg Diet
82M with pmhx of nephrolithiasis s/p lithotripsy and urinary retention presents for urinary retention s/p carrasco removal 1 day ptp. Found to have grossly positive UA after carrasco was replaced, 500cc drained initially.     Urinary retention: UTI vs Urethral stricture vs BPH   Uro c/s: attending still needs to see patient. As per PA note retention is 2/2 UTI. TOV when patient is walking 150ft. if fails will need carrasco replaced.   Continue with flomax    UTI:   UA grossly positive  Ur Cx are pending   Currently on Mack due to hx of MDR E. Coli in 2/2018  ID c/s pending     GI ppx: not indicated  DVT PPX: lovenox  Full code  Reg Diet
83 y/o male w/hx of MDR E. coli, urinary retention s/p multiple failed TOVs    Abx as per ID  Increase Flomax to 0.8 mg  Maintain carrasco.  Pt has had multiple failed TOVs.  D/C home with carrasco and F/U with Dr. Horn for further management  Case D/W Dr. Horn
retention with asymptomatic bacteruria secondary to E. coli.  No evidence of pyelonephritis/cystis/prostatitis.  See no need for long term antibiotics.   D/C meropenem.  Po macrobid 50mg q24h for 5 days.   Recall prn please

## 2018-04-01 ENCOUNTER — TRANSCRIPTION ENCOUNTER (OUTPATIENT)
Age: 83
End: 2018-04-01

## 2018-04-01 VITALS
HEART RATE: 106 BPM | DIASTOLIC BLOOD PRESSURE: 99 MMHG | TEMPERATURE: 98 F | RESPIRATION RATE: 18 BRPM | SYSTOLIC BLOOD PRESSURE: 177 MMHG

## 2018-04-01 RX ORDER — FINASTERIDE 5 MG/1
1 TABLET, FILM COATED ORAL
Qty: 30 | Refills: 0
Start: 2018-04-01 | End: 2018-04-30

## 2018-04-01 RX ORDER — TAMSULOSIN HYDROCHLORIDE 0.4 MG/1
2 CAPSULE ORAL
Qty: 60 | Refills: 1
Start: 2018-04-01 | End: 2018-05-30

## 2018-04-01 RX ORDER — NITROFURANTOIN MACROCRYSTAL 50 MG
1 CAPSULE ORAL
Qty: 5 | Refills: 0
Start: 2018-04-01 | End: 2018-04-05

## 2018-04-01 NOTE — DISCHARGE NOTE ADULT - PLAN OF CARE
Resolution Please note that there is a change in the dose of flomax and we added a new medications. Please continue meds as prescribed and follow up with Urology in 1-week for trial of voiding. Please continue with oral antibiotics for 5 days.

## 2018-04-01 NOTE — DISCHARGE NOTE ADULT - CARE PLAN
Principal Discharge DX:	Urinary retention  Goal:	Resolution  Assessment and plan of treatment:	Please note that there is a change in the dose of flomax and we added a new medications. Please continue meds as prescribed and follow up with Urology in 1-week for trial of voiding.  Secondary Diagnosis:	Urinary tract infection, E. coli  Goal:	Resolution  Assessment and plan of treatment:	Please continue with oral antibiotics for 5 days.

## 2018-04-01 NOTE — DISCHARGE NOTE ADULT - MEDICATION SUMMARY - MEDICATIONS TO CHANGE
I will SWITCH the dose or number of times a day I take the medications listed below when I get home from the hospital:    tamsulosin 0.4 mg oral capsule  -- 1 cap(s) by mouth once a day (at bedtime)

## 2018-04-01 NOTE — DISCHARGE NOTE ADULT - HOSPITAL COURSE
82Y M with pmh of BPH, nephrolithiasis s/p lithotripsy initially presented to the ED with inability to urinate for 1 day. Carrasco inserted in ED drained 500cc of urine and pt had positive UA. Thus she was admitted with Urinary retention relieved with carrasco. She was seen by Urology who rec to  increase tamsulosin to 0.8mg qhs add finasteride 5mg daily and discharge with  carrasco. Outpatient TOV at Dr Horn's office within a week. He was also seen by ID for positive UA with asymptomatic bacteruria secondary to E. coli. and No evidence of pyelonephritis/cystis/prostatitis. So ID rec no need for long term antibiotics only Po macrobid 50mg q24h for 5 days.     Thus patient will be discharged in a stable condition with meds as noted and follow up with urology.

## 2018-04-01 NOTE — DISCHARGE NOTE ADULT - MEDICATION SUMMARY - MEDICATIONS TO TAKE
I will START or STAY ON the medications listed below when I get home from the hospital:    finasteride 5 mg oral tablet  -- 1 tab(s) by mouth once a day   -- Do not take this drug if you are pregnant.    -- Indication: For Urinary retention    tamsulosin 0.4 mg oral capsule  -- 2 cap(s) by mouth once a day (at bedtime)  -- Indication: For Urinary retention    Macrodantin 50 mg oral capsule  -- 1 cap(s) by mouth once a day   -- Finish all this medication unless otherwise directed by prescriber.  May discolor urine or feces.  Take with food or milk.    -- Indication: For Urinary tract infection, E. coli

## 2018-04-01 NOTE — DISCHARGE NOTE ADULT - PATIENT PORTAL LINK FT
You can access the [facility] Patient Portal, offered by [facility], by registering with the following website: [email or web address]

## 2018-04-03 DIAGNOSIS — R33.8 OTHER RETENTION OF URINE: ICD-10-CM

## 2018-04-03 DIAGNOSIS — B96.29 OTHER ESCHERICHIA COLI [E. COLI] AS THE CAUSE OF DISEASES CLASSIFIED ELSEWHERE: ICD-10-CM

## 2018-04-03 DIAGNOSIS — Z87.442 PERSONAL HISTORY OF URINARY CALCULI: ICD-10-CM

## 2018-04-03 DIAGNOSIS — Z87.891 PERSONAL HISTORY OF NICOTINE DEPENDENCE: ICD-10-CM

## 2018-04-03 DIAGNOSIS — N40.1 BENIGN PROSTATIC HYPERPLASIA WITH LOWER URINARY TRACT SYMPTOMS: ICD-10-CM

## 2018-04-03 DIAGNOSIS — N39.0 URINARY TRACT INFECTION, SITE NOT SPECIFIED: ICD-10-CM

## 2018-04-03 DIAGNOSIS — Z16.12 EXTENDED SPECTRUM BETA LACTAMASE (ESBL) RESISTANCE: ICD-10-CM

## 2018-04-03 DIAGNOSIS — R31.9 HEMATURIA, UNSPECIFIED: ICD-10-CM

## 2018-04-03 LAB
CULTURE RESULTS: SIGNIFICANT CHANGE UP
SPECIMEN SOURCE: SIGNIFICANT CHANGE UP

## 2018-07-08 ENCOUNTER — EMERGENCY (EMERGENCY)
Facility: HOSPITAL | Age: 83
LOS: 0 days | Discharge: HOME | End: 2018-07-09
Attending: EMERGENCY MEDICINE | Admitting: EMERGENCY MEDICINE

## 2018-07-08 VITALS
RESPIRATION RATE: 16 BRPM | TEMPERATURE: 97 F | HEART RATE: 85 BPM | DIASTOLIC BLOOD PRESSURE: 85 MMHG | SYSTOLIC BLOOD PRESSURE: 151 MMHG | OXYGEN SATURATION: 97 %

## 2018-07-08 DIAGNOSIS — Z23 ENCOUNTER FOR IMMUNIZATION: ICD-10-CM

## 2018-07-08 DIAGNOSIS — Z79.899 OTHER LONG TERM (CURRENT) DRUG THERAPY: ICD-10-CM

## 2018-07-08 DIAGNOSIS — S00.511A ABRASION OF LIP, INITIAL ENCOUNTER: ICD-10-CM

## 2018-07-08 DIAGNOSIS — S01.21XA LACERATION WITHOUT FOREIGN BODY OF NOSE, INITIAL ENCOUNTER: ICD-10-CM
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
## 2018-07-08 DIAGNOSIS — Z98.890 OTHER SPECIFIED POSTPROCEDURAL STATES: Chronic | ICD-10-CM

## 2018-07-08 DIAGNOSIS — S02.2XXA FRACTURE OF NASAL BONES, INITIAL ENCOUNTER FOR CLOSED FRACTURE: ICD-10-CM

## 2018-07-08 DIAGNOSIS — S09.90XA UNSPECIFIED INJURY OF HEAD, INITIAL ENCOUNTER: ICD-10-CM
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
## 2018-07-08 DIAGNOSIS — S01.23XA PUNCTURE WOUND WITHOUT FOREIGN BODY OF NOSE, INITIAL ENCOUNTER: ICD-10-CM
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
## 2018-07-09 RX ORDER — TETANUS TOXOID, REDUCED DIPHTHERIA TOXOID AND ACELLULAR PERTUSSIS VACCINE, ADSORBED 5; 2.5; 8; 8; 2.5 [IU]/.5ML; [IU]/.5ML; UG/.5ML; UG/.5ML; UG/.5ML
0.5 SUSPENSION INTRAMUSCULAR ONCE
Qty: 0 | Refills: 0 | Status: COMPLETED | OUTPATIENT
Start: 2018-07-09 | End: 2018-07-09

## 2018-07-09 RX ADMIN — TETANUS TOXOID, REDUCED DIPHTHERIA TOXOID AND ACELLULAR PERTUSSIS VACCINE, ADSORBED 0.5 MILLILITER(S): 5; 2.5; 8; 8; 2.5 SUSPENSION INTRAMUSCULAR at 02:04

## 2018-07-09 NOTE — ED PROVIDER NOTE - OBJECTIVE STATEMENT
83 yo male with h/o prostate problem ?BPH presents to the ED c/o laceration to nose and abrasions to upper lip after falling on concrete while playing volleyball just PTA. + head trauma. no LOC. no anticoagulation. Denies headache, dizziness, visual changes, chest pain, sob, neck pain, back pain, msk pain, UE/LE weakness or paresthesias, abdominal pain, N/V. no additional trauma. normal ambulation. unknown tetanus status.

## 2018-07-09 NOTE — ED PROVIDER NOTE - NS ED ROS FT
Constitutional: no fever, chills  Eyes: no visual changes  ENT: no URI sxs, no throat pain, no change in voice, no excessive drooling, no ear pain/discharge, no hearing changes.   Cardiovascular: no chest pain, no sob  Respiratory: no cough, no shortness of breath  Gastrointestinal: no nausea, vomiting or abd pain  Musculoskeletal: no msk pain or injury. no neck pain, no back pain, no joint pain.    Integumentary: + laceration to nose, + abrasions to upper lip.  Neurological: + head trauma. no headache, no dizziness, no visual changes, no UE/LE weakness or paresthesias. no change in mental status. no neck pain or stiffness.

## 2018-07-09 NOTE — ED PROVIDER NOTE - PROGRESS NOTE DETAILS
Results discussed with patient, copy given, understands to follow up with ENT, return in 5 days for suture removal. Understands return precautions.

## 2018-07-09 NOTE — ED PROVIDER NOTE - PHYSICAL EXAMINATION
GENERAL:  well appearing, non-toxic patient in no acute distress  SKIN: skin warm, pink and dry. MMM. + abrasions to upper lip, no lacerations to inner lip. + irregularly shaped laceration to bridge of nose. wound explored under bloodless field, no foreign body.   HEAD: NC, AT  EYE: Normal lids, conjunctiva and sclera, PERRL, EOMI  ENT:  Airway intact. Patent oropharynx without erythema or exudate. Uvula midline. TMs clear b/l. no blood in nostrils.  + TTP to bridge of nose.   NECK: Neck supple. No midline cervical tenderness  PULM: CTAB. Normal respiratory effort. No respiratory distress. No wheezes, stridor, rales or rhonchi. No retractions  CV: RRR, no M/R/G.   ABD: Soft, non-tender, non-distended  MSK: FROM of all extremities.  No MSK tenderness. No edema, erythema, cyanosis. Distal pulses intact. no midline vertebral tenderness.   NEURO: A+Ox3, no sensory/motor deficits, CN II-XII intact. No speech slurring, pronator drift, facial asymmetry. Normal finger-to-nose  b/l. 5/5 strength throughout. Normal gait. Negative Romberg. GENERAL:  well appearing, non-toxic patient in no acute distress  SKIN: skin warm, pink and dry. MMM. + abrasions to upper lip, no lacerations to inner lip. + stellate laceration to bridge of nose. wound explored under bloodless field, no foreign body.   HEAD: NC, AT  EYE: Normal lids, conjunctiva and sclera, PERRL, EOMI  ENT:  Airway intact. Patent oropharynx without erythema or exudate. Uvula midline. TMs clear b/l. no blood in nostrils.  + TTP to bridge of nose.   NECK: Neck supple. No midline cervical tenderness  PULM: CTAB. Normal respiratory effort. No respiratory distress. No wheezes, stridor, rales or rhonchi. No retractions  CV: RRR, no M/R/G.   ABD: Soft, non-tender, non-distended  MSK: FROM of all extremities.  No MSK tenderness. No edema, erythema, cyanosis. Distal pulses intact. no midline vertebral tenderness.   NEURO: A+Ox3, no sensory/motor deficits, CN II-XII intact. No speech slurring, pronator drift, facial asymmetry. Normal finger-to-nose  b/l. 5/5 strength throughout. Normal gait. Negative Romberg.

## 2018-07-09 NOTE — ED PROVIDER NOTE - ATTENDING CONTRIBUTION TO CARE
81 yo male with PMH BPH presented c/o laceration to nasal bridge that occurred after trip and fall. Pt denied any HA, dizziness, LOC, N/V or dizziness. NO CP, SOB or palpitations.  VS noted, agree with exam as above.  A/P: Fall; Laceration -CT head, laceration repair, Td, reassess 83 yo male with PMH BPH presented c/o laceration to nasal bridge that occurred after trip and fall. Pt denied any HA, dizziness, LOC, N/V or dizziness. NO CP, SOB or palpitations.  VS noted, agree with exam as above.  A/P: Fall; Laceration -CT head, laceration repair, Td, reassess.

## 2018-07-09 NOTE — ED PROVIDER NOTE - CARE PLAN
Principal Discharge DX:	Laceration of nose  Secondary Diagnosis:	Nasal bone fracture  Secondary Diagnosis:	Closed head injury

## 2019-05-06 NOTE — ED PROVIDER NOTE - CROS ED CARDIOVAS ALL NEG
Jhonny is calling again to find out why he is not having surgery. You are messing with his lively can Please call him back    negative...

## 2022-01-08 NOTE — ED PROCEDURE NOTE - CPROC ED WOUND CLOSURE1
CT imaging did not show evidence of pneumonia, blood clot or other highly concerning cause of your shortness of breath.  Symptoms possibly due to viral syndrome.  Prescription provided for cough medication.  Recommend you follow-up with your cardiologist next available appointment to discuss your chest pain symptoms.  Please do not hesitate to return here for new onset or worsening symptoms.   skin suture

## 2022-02-23 ENCOUNTER — APPOINTMENT (OUTPATIENT)
Dept: CARDIOLOGY | Facility: CLINIC | Age: 87
End: 2022-02-23
Payer: MEDICARE

## 2022-02-23 ENCOUNTER — RESULT CHARGE (OUTPATIENT)
Age: 87
End: 2022-02-23

## 2022-02-23 VITALS
HEIGHT: 68 IN | SYSTOLIC BLOOD PRESSURE: 155 MMHG | BODY MASS INDEX: 23.79 KG/M2 | DIASTOLIC BLOOD PRESSURE: 82 MMHG | WEIGHT: 157 LBS

## 2022-02-23 VITALS — DIASTOLIC BLOOD PRESSURE: 90 MMHG | SYSTOLIC BLOOD PRESSURE: 144 MMHG

## 2022-02-23 DIAGNOSIS — Z78.9 OTHER SPECIFIED HEALTH STATUS: ICD-10-CM

## 2022-02-23 DIAGNOSIS — Z87.891 PERSONAL HISTORY OF NICOTINE DEPENDENCE: ICD-10-CM

## 2022-02-23 PROBLEM — Z00.00 ENCOUNTER FOR PREVENTIVE HEALTH EXAMINATION: Status: ACTIVE | Noted: 2022-02-23

## 2022-02-23 PROBLEM — N39.0 URINARY TRACT INFECTION, SITE NOT SPECIFIED: Chronic | Status: ACTIVE | Noted: 2018-03-28

## 2022-02-23 PROCEDURE — 93242 EXT ECG>48HR<7D RECORDING: CPT

## 2022-02-23 PROCEDURE — 93000 ELECTROCARDIOGRAM COMPLETE: CPT | Mod: 59

## 2022-02-23 PROCEDURE — 99204 OFFICE O/P NEW MOD 45 MIN: CPT

## 2022-02-23 NOTE — ASSESSMENT
[FreeTextEntry1] : 86-yo male with no significant prior medical history, good functional capacity. Patient is undergoing preoperative evaluation for inguinal hernia repair.\par Elevated BP today.\par Abnormal ECG with RBBB and indeterminate axis (extreme left or right), evidence of RVH.\par H/o smoking for many years, ? COPD.\par \par Plan:\par Will recheck BP and start treatment if elevated again.\par Holter monitor for 3 days.\par 2D ECHO.\par F/u in 1 week.\par Blood work from PMD.\par \par Lan Flores MD\par \par

## 2022-02-23 NOTE — PHYSICAL EXAM
[Well Developed] : well developed [Well Nourished] : well nourished [No Acute Distress] : no acute distress [Normal Conjunctiva] : normal conjunctiva [Normal Venous Pressure] : normal venous pressure [No Carotid Bruit] : no carotid bruit [Normal S1, S2] : normal S1, S2 [No Murmur] : no murmur [No Rub] : no rub [S4] : S4 [Clear Lung Fields] : clear lung fields [No Respiratory Distress] : no respiratory distress  [Soft] : abdomen soft [Non Tender] : non-tender [Normal Bowel Sounds] : normal bowel sounds [Normal Gait] : normal gait [No Edema] : no edema [No Cyanosis] : no cyanosis [No Clubbing] : no clubbing [No Varicosities] : no varicosities [Normal PT B/L] : normal PT B/L [No Rash] : no rash [Moves all extremities] : moves all extremities [No Focal Deficits] : no focal deficits [Normal Speech] : normal speech [Alert and Oriented] : alert and oriented [Normal memory] : normal memory [de-identified] : reduced breath sounds bilaterally

## 2022-02-23 NOTE — REVIEW OF SYSTEMS
[Blurry Vision] : no blurred vision [Dizziness] : dizziness [Tremor] : no tremor was seen [Tingling (Paresthesia)] : no tingling [Limb Weakness (Paresis)] : no limb weakness (Paresis) [Speech Disturbance] : no speech disturbance [Anxiety] : no anxiety [Negative] : Gastrointestinal

## 2022-02-23 NOTE — HISTORY OF PRESENT ILLNESS
[FreeTextEntry1] : 86-yo male who was found to have abnormal ECG during preoperative evaluation for right inguinal hernia repair and referred for cardiac evaluation. Patient denies CP, SOB, palpitations, dizziness LOC. He states that his BP has always been normal.\par \par He used to walk up to 2 miles a day until 2 weeks ago.

## 2022-03-02 ENCOUNTER — APPOINTMENT (OUTPATIENT)
Dept: CARDIOLOGY | Facility: CLINIC | Age: 87
End: 2022-03-02
Payer: MEDICARE

## 2022-03-02 PROCEDURE — 93306 TTE W/DOPPLER COMPLETE: CPT

## 2022-03-04 ENCOUNTER — APPOINTMENT (OUTPATIENT)
Dept: CARDIOLOGY | Facility: CLINIC | Age: 87
End: 2022-03-04
Payer: MEDICARE

## 2022-03-04 VITALS
BODY MASS INDEX: 23.95 KG/M2 | SYSTOLIC BLOOD PRESSURE: 142 MMHG | WEIGHT: 158 LBS | HEIGHT: 68 IN | DIASTOLIC BLOOD PRESSURE: 78 MMHG

## 2022-03-04 DIAGNOSIS — I45.10 UNSPECIFIED RIGHT BUNDLE-BRANCH BLOCK: ICD-10-CM

## 2022-03-04 DIAGNOSIS — Z01.810 ENCOUNTER FOR PREPROCEDURAL CARDIOVASCULAR EXAMINATION: ICD-10-CM

## 2022-03-04 DIAGNOSIS — I45.2 BIFASCICULAR BLOCK: ICD-10-CM

## 2022-03-04 PROCEDURE — 93000 ELECTROCARDIOGRAM COMPLETE: CPT | Mod: 59

## 2022-03-04 PROCEDURE — 93015 CV STRESS TEST SUPVJ I&R: CPT

## 2022-03-04 PROCEDURE — 99214 OFFICE O/P EST MOD 30 MIN: CPT | Mod: 25

## 2022-03-06 PROBLEM — Z01.810 PREOPERATIVE CARDIOVASCULAR EXAMINATION: Status: ACTIVE | Noted: 2022-02-23

## 2022-03-06 PROBLEM — I45.2 BIFASCICULAR BLOCK: Status: ACTIVE | Noted: 2022-03-04

## 2022-03-06 NOTE — HISTORY OF PRESENT ILLNESS
[FreeTextEntry1] : 86-yo male who was found to have abnormal ECG during preoperative evaluation for right inguinal hernia repair and referred for cardiac evaluation. Patient denies CP, SOB, palpitations, dizziness LOC. He states that his BP has always been normal.\par \par

## 2022-03-06 NOTE — ASSESSMENT
[FreeTextEntry1] : 86-yo male with no significant prior medical history, good functional capacity. Patient is undergoing preoperative evaluation for inguinal hernia repair.\par Elevated BP today but normal BP response to exercise.\par Bifascicular block, asymptomatic.\par Evidence of inferior, inferolateral scar on ECHO. Good functional capacity (6.5 METs), no evidence of ischemia.\par Low risk for cardiovascular complications of inguinal hernia repair.\par \par \par Plan:\par Proceed with surgery (no beta blockers).\par Will continue to monitor BP.\par F/u in 2 months, will discuss medical therapy (statin, BP medications).\par \par \par Lan Flores MD\par \par

## 2022-03-06 NOTE — REVIEW OF SYSTEMS
[Dizziness] : dizziness [Negative] : Gastrointestinal [Tremor] : no tremor was seen [Blurry Vision] : no blurred vision [Tingling (Paresthesia)] : no tingling [Limb Weakness (Paresis)] : no limb weakness (Paresis) [Speech Disturbance] : no speech disturbance [Anxiety] : no anxiety

## 2022-03-06 NOTE — PHYSICAL EXAM
[Well Developed] : well developed [Well Nourished] : well nourished [No Acute Distress] : no acute distress [Normal Conjunctiva] : normal conjunctiva [Normal Venous Pressure] : normal venous pressure [No Carotid Bruit] : no carotid bruit [Normal S1, S2] : normal S1, S2 [No Murmur] : no murmur [No Rub] : no rub [S4] : S4 [Clear Lung Fields] : clear lung fields [No Respiratory Distress] : no respiratory distress  [Soft] : abdomen soft [Non Tender] : non-tender [Normal Bowel Sounds] : normal bowel sounds [Normal Gait] : normal gait [No Edema] : no edema [No Cyanosis] : no cyanosis [No Clubbing] : no clubbing [No Varicosities] : no varicosities [Normal PT B/L] : normal PT B/L [No Rash] : no rash [Moves all extremities] : moves all extremities [No Focal Deficits] : no focal deficits [Normal Speech] : normal speech [Alert and Oriented] : alert and oriented [Normal memory] : normal memory [de-identified] : reduced breath sounds bilaterally

## 2022-03-06 NOTE — CARDIOLOGY SUMMARY
[de-identified] : 03/04/22:\par SR 78/min, RBBB, LAFB, ? RVH. [de-identified] : Holter monitor 02/23/22:\par SR with occasional PVCs, PACs, short runs of AT. [de-identified] : EST 03/04/22:\par 6.5 METs, 86% of MPHR, no evidence of ischemia. [de-identified] : Inferior, inferolateral scar, LVEF 40-45%\par G1DD\par Atrial septal aneurysm\par Mild TR, MR, AI.

## 2022-03-07 PROBLEM — Z00.00 ENCOUNTER FOR PREVENTIVE HEALTH EXAMINATION: Noted: 2022-03-07

## 2022-03-17 ENCOUNTER — APPOINTMENT (OUTPATIENT)
Dept: SURGERY | Facility: CLINIC | Age: 87
End: 2022-03-17

## 2023-01-30 ENCOUNTER — INPATIENT (INPATIENT)
Facility: HOSPITAL | Age: 88
LOS: 10 days | Discharge: HOME | DRG: 35 | End: 2023-02-10
Attending: NEUROLOGICAL SURGERY | Admitting: NEUROLOGICAL SURGERY
Payer: MEDICARE

## 2023-01-30 VITALS
RESPIRATION RATE: 20 BRPM | DIASTOLIC BLOOD PRESSURE: 72 MMHG | HEART RATE: 72 BPM | OXYGEN SATURATION: 95 % | TEMPERATURE: 97 F | SYSTOLIC BLOOD PRESSURE: 162 MMHG

## 2023-01-30 DIAGNOSIS — I50.22 CHRONIC SYSTOLIC (CONGESTIVE) HEART FAILURE: ICD-10-CM

## 2023-01-30 DIAGNOSIS — Z87.891 PERSONAL HISTORY OF NICOTINE DEPENDENCE: ICD-10-CM

## 2023-01-30 DIAGNOSIS — Z87.442 PERSONAL HISTORY OF URINARY CALCULI: ICD-10-CM

## 2023-01-30 DIAGNOSIS — I25.10 ATHEROSCLEROTIC HEART DISEASE OF NATIVE CORONARY ARTERY WITHOUT ANGINA PECTORIS: ICD-10-CM

## 2023-01-30 DIAGNOSIS — Z79.82 LONG TERM (CURRENT) USE OF ASPIRIN: ICD-10-CM

## 2023-01-30 DIAGNOSIS — Z86.73 PERSONAL HISTORY OF TRANSIENT ISCHEMIC ATTACK (TIA), AND CEREBRAL INFARCTION WITHOUT RESIDUAL DEFICITS: ICD-10-CM

## 2023-01-30 DIAGNOSIS — R00.1 BRADYCARDIA, UNSPECIFIED: ICD-10-CM

## 2023-01-30 DIAGNOSIS — I95.1 ORTHOSTATIC HYPOTENSION: ICD-10-CM

## 2023-01-30 DIAGNOSIS — I42.9 CARDIOMYOPATHY, UNSPECIFIED: ICD-10-CM

## 2023-01-30 DIAGNOSIS — R29.701 NIHSS SCORE 1: ICD-10-CM

## 2023-01-30 DIAGNOSIS — Z79.01 LONG TERM (CURRENT) USE OF ANTICOAGULANTS: ICD-10-CM

## 2023-01-30 DIAGNOSIS — I65.23 OCCLUSION AND STENOSIS OF BILATERAL CAROTID ARTERIES: ICD-10-CM

## 2023-01-30 DIAGNOSIS — Z98.890 OTHER SPECIFIED POSTPROCEDURAL STATES: Chronic | ICD-10-CM

## 2023-01-30 DIAGNOSIS — A08.11 ACUTE GASTROENTEROPATHY DUE TO NORWALK AGENT: ICD-10-CM

## 2023-01-30 DIAGNOSIS — R42 DIZZINESS AND GIDDINESS: ICD-10-CM

## 2023-01-30 DIAGNOSIS — R91.8 OTHER NONSPECIFIC ABNORMAL FINDING OF LUNG FIELD: ICD-10-CM

## 2023-01-30 DIAGNOSIS — N40.0 BENIGN PROSTATIC HYPERPLASIA WITHOUT LOWER URINARY TRACT SYMPTOMS: ICD-10-CM

## 2023-01-30 LAB
ALBUMIN SERPL ELPH-MCNC: 4 G/DL — SIGNIFICANT CHANGE UP (ref 3.5–5.2)
ALP SERPL-CCNC: 48 U/L — SIGNIFICANT CHANGE UP (ref 30–115)
ALT FLD-CCNC: 8 U/L — SIGNIFICANT CHANGE UP (ref 0–41)
ANION GAP SERPL CALC-SCNC: 13 MMOL/L — SIGNIFICANT CHANGE UP (ref 7–14)
APPEARANCE UR: CLEAR — SIGNIFICANT CHANGE UP
APTT BLD: SIGNIFICANT CHANGE UP SEC (ref 27–39.2)
AST SERPL-CCNC: 16 U/L — SIGNIFICANT CHANGE UP (ref 0–41)
BILIRUB SERPL-MCNC: 0.6 MG/DL — SIGNIFICANT CHANGE UP (ref 0.2–1.2)
BILIRUB UR-MCNC: NEGATIVE — SIGNIFICANT CHANGE UP
BUN SERPL-MCNC: 16 MG/DL — SIGNIFICANT CHANGE UP (ref 10–20)
CALCIUM SERPL-MCNC: 8.6 MG/DL — SIGNIFICANT CHANGE UP (ref 8.4–10.5)
CHLORIDE SERPL-SCNC: 108 MMOL/L — SIGNIFICANT CHANGE UP (ref 98–110)
CO2 SERPL-SCNC: 22 MMOL/L — SIGNIFICANT CHANGE UP (ref 17–32)
COLOR SPEC: YELLOW — SIGNIFICANT CHANGE UP
CREAT SERPL-MCNC: 0.9 MG/DL — SIGNIFICANT CHANGE UP (ref 0.7–1.5)
DIFF PNL FLD: NEGATIVE — SIGNIFICANT CHANGE UP
EGFR: 84 ML/MIN/1.73M2 — SIGNIFICANT CHANGE UP
GLUCOSE SERPL-MCNC: 111 MG/DL — HIGH (ref 70–99)
GLUCOSE UR QL: NEGATIVE — SIGNIFICANT CHANGE UP
HCT VFR BLD CALC: 44.7 % — SIGNIFICANT CHANGE UP (ref 42–52)
HGB BLD-MCNC: 14.9 G/DL — SIGNIFICANT CHANGE UP (ref 14–18)
INR BLD: 1.03 RATIO — SIGNIFICANT CHANGE UP (ref 0.65–1.3)
KETONES UR-MCNC: NEGATIVE — SIGNIFICANT CHANGE UP
LEUKOCYTE ESTERASE UR-ACNC: NEGATIVE — SIGNIFICANT CHANGE UP
MCHC RBC-ENTMCNC: 31.6 PG — HIGH (ref 27–31)
MCHC RBC-ENTMCNC: 33.3 G/DL — SIGNIFICANT CHANGE UP (ref 32–37)
MCV RBC AUTO: 94.7 FL — HIGH (ref 80–94)
NITRITE UR-MCNC: NEGATIVE — SIGNIFICANT CHANGE UP
NRBC # BLD: 0 /100 WBCS — SIGNIFICANT CHANGE UP (ref 0–0)
PH UR: 6 — SIGNIFICANT CHANGE UP (ref 5–8)
PLATELET # BLD AUTO: 234 K/UL — SIGNIFICANT CHANGE UP (ref 130–400)
POTASSIUM SERPL-MCNC: 4.6 MMOL/L — SIGNIFICANT CHANGE UP (ref 3.5–5)
POTASSIUM SERPL-SCNC: 4.6 MMOL/L — SIGNIFICANT CHANGE UP (ref 3.5–5)
PROT SERPL-MCNC: 6.5 G/DL — SIGNIFICANT CHANGE UP (ref 6–8)
PROT UR-MCNC: SIGNIFICANT CHANGE UP
PROTHROM AB SERPL-ACNC: 11.8 SEC — SIGNIFICANT CHANGE UP (ref 9.95–12.87)
RBC # BLD: 4.72 M/UL — SIGNIFICANT CHANGE UP (ref 4.7–6.1)
RBC # FLD: 14 % — SIGNIFICANT CHANGE UP (ref 11.5–14.5)
SARS-COV-2 RNA SPEC QL NAA+PROBE: SIGNIFICANT CHANGE UP
SODIUM SERPL-SCNC: 143 MMOL/L — SIGNIFICANT CHANGE UP (ref 135–146)
SP GR SPEC: 1.02 — SIGNIFICANT CHANGE UP (ref 1.01–1.03)
TROPONIN T SERPL-MCNC: <0.01 NG/ML — SIGNIFICANT CHANGE UP
TROPONIN T SERPL-MCNC: <0.01 NG/ML — SIGNIFICANT CHANGE UP
UROBILINOGEN FLD QL: SIGNIFICANT CHANGE UP
WBC # BLD: 9.02 K/UL — SIGNIFICANT CHANGE UP (ref 4.8–10.8)
WBC # FLD AUTO: 9.02 K/UL — SIGNIFICANT CHANGE UP (ref 4.8–10.8)

## 2023-01-30 PROCEDURE — 80048 BASIC METABOLIC PNL TOTAL CA: CPT

## 2023-01-30 PROCEDURE — 70498 CT ANGIOGRAPHY NECK: CPT

## 2023-01-30 PROCEDURE — 87493 C DIFF AMPLIFIED PROBE: CPT

## 2023-01-30 PROCEDURE — C1887: CPT

## 2023-01-30 PROCEDURE — 93010 ELECTROCARDIOGRAM REPORT: CPT | Mod: 77

## 2023-01-30 PROCEDURE — G0378: CPT

## 2023-01-30 PROCEDURE — 81003 URINALYSIS AUTO W/O SCOPE: CPT

## 2023-01-30 PROCEDURE — 71045 X-RAY EXAM CHEST 1 VIEW: CPT

## 2023-01-30 PROCEDURE — 37215 TRANSCATH STENT CCA W/EPS: CPT

## 2023-01-30 PROCEDURE — 70496 CT ANGIOGRAPHY HEAD: CPT | Mod: 26,MA

## 2023-01-30 PROCEDURE — 87507 IADNA-DNA/RNA PROBE TQ 12-25: CPT

## 2023-01-30 PROCEDURE — 84100 ASSAY OF PHOSPHORUS: CPT

## 2023-01-30 PROCEDURE — 85027 COMPLETE CBC AUTOMATED: CPT

## 2023-01-30 PROCEDURE — C1894: CPT

## 2023-01-30 PROCEDURE — 83735 ASSAY OF MAGNESIUM: CPT

## 2023-01-30 PROCEDURE — 70498 CT ANGIOGRAPHY NECK: CPT | Mod: 26,MA

## 2023-01-30 PROCEDURE — 97163 PT EVAL HIGH COMPLEX 45 MIN: CPT | Mod: GP

## 2023-01-30 PROCEDURE — 83631 LACTOFERRIN FECAL (QUANT): CPT

## 2023-01-30 PROCEDURE — C1884: CPT

## 2023-01-30 PROCEDURE — 70551 MRI BRAIN STEM W/O DYE: CPT

## 2023-01-30 PROCEDURE — C1769: CPT

## 2023-01-30 PROCEDURE — U0003: CPT

## 2023-01-30 PROCEDURE — 70496 CT ANGIOGRAPHY HEAD: CPT

## 2023-01-30 PROCEDURE — 97161 PT EVAL LOW COMPLEX 20 MIN: CPT | Mod: GP

## 2023-01-30 PROCEDURE — 70450 CT HEAD/BRAIN W/O DYE: CPT | Mod: 26,MA,59

## 2023-01-30 PROCEDURE — 71045 X-RAY EXAM CHEST 1 VIEW: CPT | Mod: 26

## 2023-01-30 PROCEDURE — 83993 ASSAY FOR CALPROTECTIN FECAL: CPT

## 2023-01-30 PROCEDURE — 93458 L HRT ARTERY/VENTRICLE ANGIO: CPT

## 2023-01-30 PROCEDURE — 36415 COLL VENOUS BLD VENIPUNCTURE: CPT

## 2023-01-30 PROCEDURE — C1760: CPT

## 2023-01-30 PROCEDURE — 80053 COMPREHEN METABOLIC PANEL: CPT

## 2023-01-30 PROCEDURE — C1876: CPT

## 2023-01-30 PROCEDURE — C1725: CPT

## 2023-01-30 PROCEDURE — 70551 MRI BRAIN STEM W/O DYE: CPT | Mod: 26,MA

## 2023-01-30 PROCEDURE — U0005: CPT

## 2023-01-30 PROCEDURE — 99223 1ST HOSP IP/OBS HIGH 75: CPT | Mod: FS

## 2023-01-30 PROCEDURE — 97167 OT EVAL HIGH COMPLEX 60 MIN: CPT | Mod: GO

## 2023-01-30 PROCEDURE — 86850 RBC ANTIBODY SCREEN: CPT

## 2023-01-30 PROCEDURE — 93010 ELECTROCARDIOGRAM REPORT: CPT

## 2023-01-30 PROCEDURE — 93005 ELECTROCARDIOGRAM TRACING: CPT

## 2023-01-30 PROCEDURE — 99285 EMERGENCY DEPT VISIT HI MDM: CPT | Mod: 25

## 2023-01-30 PROCEDURE — 86901 BLOOD TYPING SEROLOGIC RH(D): CPT

## 2023-01-30 PROCEDURE — 76937 US GUIDE VASCULAR ACCESS: CPT

## 2023-01-30 PROCEDURE — 86900 BLOOD TYPING SEROLOGIC ABO: CPT

## 2023-01-30 PROCEDURE — 85730 THROMBOPLASTIN TIME PARTIAL: CPT

## 2023-01-30 PROCEDURE — 70450 CT HEAD/BRAIN W/O DYE: CPT

## 2023-01-30 PROCEDURE — 85610 PROTHROMBIN TIME: CPT

## 2023-01-30 PROCEDURE — 93306 TTE W/DOPPLER COMPLETE: CPT

## 2023-01-30 PROCEDURE — 84484 ASSAY OF TROPONIN QUANT: CPT

## 2023-01-30 RX ORDER — ATORVASTATIN CALCIUM 80 MG/1
80 TABLET, FILM COATED ORAL AT BEDTIME
Refills: 0 | Status: DISCONTINUED | OUTPATIENT
Start: 2023-01-30 | End: 2023-02-07

## 2023-01-30 RX ORDER — ASPIRIN/CALCIUM CARB/MAGNESIUM 324 MG
324 TABLET ORAL ONCE
Refills: 0 | Status: COMPLETED | OUTPATIENT
Start: 2023-01-30 | End: 2023-01-30

## 2023-01-30 RX ORDER — CLOPIDOGREL BISULFATE 75 MG/1
300 TABLET, FILM COATED ORAL ONCE
Refills: 0 | Status: COMPLETED | OUTPATIENT
Start: 2023-01-30 | End: 2023-01-30

## 2023-01-30 RX ADMIN — ATORVASTATIN CALCIUM 80 MILLIGRAM(S): 80 TABLET, FILM COATED ORAL at 17:51

## 2023-01-30 RX ADMIN — CLOPIDOGREL BISULFATE 300 MILLIGRAM(S): 75 TABLET, FILM COATED ORAL at 17:51

## 2023-01-30 RX ADMIN — Medication 324 MILLIGRAM(S): at 17:51

## 2023-01-30 NOTE — ED CDU PROVIDER INITIAL DAY NOTE - CLINICAL SUMMARY MEDICAL DECISION MAKING FREE TEXT BOX
87-year-old man with past medical history significant for nephrolithiasis presenting with 5 days of intermittent dizziness.  Dizziness is associated with unsteady gait.  CT head and CTA head and neck results appreciated.  Neurology consult reviewed and recommendations appreciated.  NIHSS-1 for LUE pronator drift and LUE dysmetria. Aspirin and Plavix given.  All diagnostic testing reviewed.  All labs reviewed.  Chest x-ray with left lower lobe opacity.  Patient with no cough, fever, chills or URI symptoms.  CTA neck with left upper lobe pulmonary nodules.  Son and patient made aware.  Copies of the results given to the patient and son.  Patient instructed to follow-up with PMD for CT Chest.  Awaiting MRI brain and TTE.

## 2023-01-30 NOTE — ED PROVIDER NOTE - INPATIENT RESIDENT/ACP NOTIFIED DATE
30-Jan-2023 17:46 INTERVAL HPI/OVERNIGHT EVENTS:  chart reviewed  on ra  no diarrhea no gib per nursing        MEDICATIONS  (STANDING):  ascorbic acid 500 milliGRAM(s) Oral daily  budesonide 160 MICROgram(s)/formoterol 4.5 MICROgram(s) Inhaler 2 Puff(s) Inhalation two times a day  cholecalciferol 2000 Unit(s) Oral daily  dexAMETHasone  Injectable 6 milliGRAM(s) IV Push daily  enoxaparin Injectable 40 milliGRAM(s) SubCutaneous daily  famotidine    Tablet 20 milliGRAM(s) Oral two times a day  influenza   Vaccine 0.5 milliLiter(s) IntraMuscular once  lactobacillus acidophilus 1 Tablet(s) Oral daily  lidocaine   Patch 1 Patch Transdermal every 24 hours  multivitamin/minerals 1 Tablet(s) Oral daily  potassium chloride   Powder 20 milliEquivalent(s) Oral four times a day  pramipexole 1.5 milliGRAM(s) Oral every 24 hours  remdesivir  IVPB 100 milliGRAM(s) IV Intermittent every 24 hours  remdesivir  IVPB   IV Intermittent   sulfaSALAzine 500 milliGRAM(s) Oral three times a day  torsemide 5 milliGRAM(s) Oral two times a day    MEDICATIONS  (PRN):  acetaminophen   Tablet .. 650 milliGRAM(s) Oral every 6 hours PRN Temp greater or equal to 38C (100.4F), Mild Pain (1 - 3)  ALBUTerol    90 MICROgram(s) HFA Inhaler 2 Puff(s) Inhalation every 4 hours PRN Shortness of Breath and/or Wheezing  aluminum hydroxide/magnesium hydroxide/simethicone Suspension 30 milliLiter(s) Oral every 4 hours PRN Dyspepsia  benzonatate 100 milliGRAM(s) Oral three times a day PRN Cough  melatonin 3 milliGRAM(s) Oral at bedtime PRN Insomnia  ondansetron Injectable 4 milliGRAM(s) IV Push every 6 hours PRN Nausea and/or Vomiting  oxyCODONE    IR 5 milliGRAM(s) Oral four times a day PRN Severe Pain (7 - 10)  traMADol 25 milliGRAM(s) Oral four times a day PRN Moderate Pain (4 - 6)      Allergies    No Known Allergies    Intolerances        Review of Systems:    tele f/u      Vital Signs Last 24 Hrs  T(C): 36.3 (2021 04:59), Max: 36.8 (2021 21:39)  T(F): 97.4 (2021 04:59), Max: 98.2 (2021 21:39)  HR: 71 (2021 04:59) (60 - 74)  BP: 120/64 (2021 04:59) (96/57 - 120/64)  BP(mean): --  RR: 18 (2021 04:59) (18 - 20)  SpO2: 96% (2021 04:59) (92% - 99%)    PHYSICAL EXAM:    tele f/u      LABS:                        8.6    6.69  )-----------( 209      ( 2021 09:07 )             27.5         141  |  109<H>  |  9   ----------------------------<  160<H>  4.2   |  26  |  0.84    Ca    7.7<L>      2021 09:07  Phos  3.1       Mg     1.9         TPro  5.8<L>  /  Alb  2.1<L>  /  TBili  0.2  /  DBili  <.10  /  AST  59<H>  /  ALT  20  /  AlkPhos  163<H>      PT/INR - ( 2021 23:16 )   PT: 17.6 sec;   INR: 1.54 ratio         PTT - ( 2021 23:16 )  PTT:28.6 sec  Urinalysis Basic - ( 2021 20:10 )    Color: Yellow / Appearance: Clear / S.010 / pH: x  Gluc: x / Ketone: Negative  / Bili: Negative / Urobili: Negative   Blood: x / Protein: 30 mg/dL / Nitrite: Negative   Leuk Esterase: Negative / RBC: 0-2 /HPF / WBC 0-2   Sq Epi: x / Non Sq Epi: Few / Bacteria: Few        RADIOLOGY & ADDITIONAL TESTS:

## 2023-01-30 NOTE — CONSULT NOTE ADULT - ASSESSMENT
87y Male, [place] speaking, with no known PMHx presented to the hospital for intermittent, episodic vertigo that started 5 days ago, associated with feeling off balance and unsteady gait. Neuro exam significant for L pronator drift and L sided dysmetria. NIHSS1. 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 Currently asymptomatic. Rule out lacunar vs posterior circulation stroke.    Recommendation:  S/p ASA and Plavix load  C/w ASA 81 mg and Plavix 75 mg daily  Atorvastatin 80 mg  Q4 neuro checks  Brain MRI w/o contrast  Neuroendovascular eval for R ICA stenosis    Discussed with neurovascular attending     87y Male, Haitian speaking, with no known PMHx presented to the hospital for intermittent, episodic vertigo that started 5 days ago, associated with feeling off balance and unsteady gait. Neuro exam significant for L pronator drift and L sided dysmetria. NIHSS1. 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 Currently asymptomatic. Rule out lacunar vs posterior circulation stroke.    Recommendation:  S/p ASA and Plavix load  C/w ASA 81 mg and Plavix 75 mg daily  Atorvastatin 80 mg  Q4 neuro checks  Brain MRI w/o contrast  TTE/A1c, LDL  Neuroendovascular eval for R ICA stenosis    Discussed with neurovascular attending

## 2023-01-30 NOTE — CONSULT NOTE ADULT - SUBJECTIVE AND OBJECTIVE BOX
**STROKE CONSULT NOTE**    Last known well time/Time of onset of symptoms: 5 days ago    HPI: 87y Male with no known PMHx presented to the hospital for dizziness.       T(C): 36.2 (23 @ 16:24), Max: 36.2 (23 @ 16:24)  HR: 75 (23 @ 20:00) (68 - 75)  BP: 135/75 (23 @ 20:00) (135/75 - 179/84)  RR: 18 (23 @ 20:00) (18 - 20)  SpO2: 99% (23 @ 20:00) (95% - 99%)    PAST MEDICAL & SURGICAL HISTORY:  Kidney stones      Urinary tract infection, E. coli      H/O lithotripsy          FAMILY HISTORY:  No pertinent family history in first degree relatives        SOCIAL HISTORY:   Patient lives with *** at ***.   Smoking status:  Drinking:  Drug Use:     ROS: ***  Constitutional: No fever, weight loss or fatigue  Eyes: No eye pain, visual disturbances, or discharge  ENMT:  No difficulty hearing, tinnitus; No sinus or throat pain  Neck: No pain or stiffness  Respiratory: No cough, wheezing, chills or hemoptysis  Cardiovascular: No chest pain, palpitations, shortness of breath, or leg swelling  Gastrointestinal: No abdominal pain. No nausea, vomiting or hematemesis; No diarrhea or constipation. Nohematochezia.  Genitourinary: No dysuria, frequency, hematuria or incontinence  Neurological: As per HPI  Skin: No itching, burning, rashes or lesions   Endocrine: No heat or cold intolerance; No hair loss  Musculoskeletal: No joint pain or swelling; No muscle, back or extremity pain  Heme/Lymph: No easy bruising or bleeding gums    MEDICATIONS  (STANDING):  atorvastatin 80 milliGRAM(s) Oral at bedtime    MEDICATIONS  (PRN):    Allergies    No Known Allergies    Intolerances      Vital Signs Last 24 Hrs  T(C): 36.2 (2023 16:24), Max: 36.2 (2023 16:24)  T(F): 97.1 (2023 16:24), Max: 97.1 (2023 16:24)  HR: 75 (2023 20:00) (68 - 75)  BP: 135/75 (2023 20:00) (135/75 - 179/84)  BP(mean): --  RR: 18 (2023 20:00) (18 - 20)  SpO2: 99% (2023 20:00) (95% - 99%)    Parameters below as of 2023 20:00  Patient On (Oxygen Delivery Method): room air        Physical exam:  Constitutional: No acute distress, conversant  Eyes: Anicteric sclerae, moist conjunctivae, see below for CNs  Neck: trachea midline, FROM, supple, no thyromegaly or lymphadenopathy  Cardiovascular: Regular rate and rhythm, no murmurs, rubs, or gallops. No carotid bruits.   Pulmonary: Anterior breath sounds clear bilaterally, no crackles or wheezing. No use of accessory muscles  GI: Abdomen soft, non-distended, non-tender  Extremities: Radial and DP pulses +2, no edema    Neurologic:  -Mental status: Awake, alert, oriented to person, place, and time. Speech is fluent with intact naming, repetition, and comprehension, no dysarthria. Recent and remote memory intact. Follows commands. Attention/concentration intact. Fund of knowledge appropriate.  -Cranial nerves:   II: Visual fields are full to confrontation.  III, IV, VI: Extraocular movements are intact without nystagmus. Pupils equally round and reactive to light  V:  Facial sensation V1-V3 equal and intact   VII: Face is symmetric with normal eye closure and smile  VIII: Hearing is bilaterally intact to finger rub  IX, X: Uvula is midline and soft palate rises symmetrically  XI: Head turning and shoulder shrug are intact.  XII: Tongue protrudes midline  Motor: Normal bulk and tone. No pronator drift. Strength bilateral upper extremity 5/5, bilateral lower extremities 5/5.  Rapid alternating movements intact and symmetric  Sensation: Intact to light touch bilaterally. No neglect or extinction on double simultaneous testing.  Coordination: No dysmetria on finger-to-nose and heel-to-shin bilaterally  Reflexes: Downgoing toes bilaterally   Gait: Narrow gait and steady    NIHSS: **** ASPECT Score: ***** ICH Score: ****** (GCS)    Fingerstick Blood Glucose: CAPILLARY BLOOD GLUCOSE        LABS:                        14.9   9.02  )-----------( 234      ( 2023 16:25 )             44.7         143  |  108  |  16  ----------------------------<  111<H>  4.6   |  22  |  0.9    Ca    8.6      2023 13:21    TPro  6.5  /  Alb  4.0  /  TBili  0.6  /  DBili  x   /  AST  16  /  ALT  8   /  AlkPhos  48      PT/INR - ( 2023 13:21 )   PT: TNP sec;   INR: TNP ratio         PTT - ( 2023 13:21 )  PTT:TNP sec  CARDIAC MARKERS ( 2023 19:26 )  x     / <0.01 ng/mL / x     / x     / x      CARDIAC MARKERS ( 2023 13:21 )  x     / <0.01 ng/mL / x     / x     / x          Urinalysis Basic - ( 2023 13:50 )    Color: Yellow / Appearance: Clear / S.018 / pH: x  Gluc: x / Ketone: Negative  / Bili: Negative / Urobili: <2 mg/dL   Blood: x / Protein: Trace / Nitrite: Negative   Leuk Esterase: Negative / RBC: x / WBC x   Sq Epi: x / Non Sq Epi: x / Bacteria: x        RADIOLOGY & ADDITIONAL STUDIES:      -----------------------------------------------------------------------------------------------------------------  IV-tPA (Y/N):    ***                              Bolus time:    Alteplase Dose Verification w/ RN:  Reason IV-tPA not given: ***    **STROKE CONSULT NOTE**    Last known well time/Time of onset of symptoms: 5 days ago    HPI: 87y Male with no known PMHx presented to the hospital for dizziness. The patient endorses symptoms started 5 days ago. Dizziness described as a room spinning sensation. Dizziness seems to be episodic, triggered when moving from lying to sitting position and from sitting to standing. Each episode last for few minutes, and it's usually relieved by sitting or lying down. He has seen his PCP, who recommended brain MRI. This morning he went to the MRI appointment and had a similar episode after he got off the car. He denies falls, but reports he has been feeling off balance, and sometime uses sticks to ambulate around. He denies weakness numbness, difficulty finding words, double vision or loss of vision.    T(C): 36.2 (23 @ 16:24), Max: 36.2 (23 @ 16:24)  HR: 75 (23 @ 20:00) (68 - 75)  BP: 135/75 (23 @ 20:00) (135/75 - 179/84)  RR: 18 (23 @ 20:00) (18 - 20)  SpO2: 99% (23 @ 20:00) (95% - 99%)    PAST MEDICAL & SURGICAL HISTORY:  Kidney stones      Urinary tract infection, E. coli      H/O lithotripsy          FAMILY HISTORY:  No pertinent family history in first degree relatives        SOCIAL HISTORY:   Patient lives with wife  Smoking status: ex-smoker  Drinking: occasional  Drug Use: denies    ROS:   Constitutional: No fever, + weight loss or fatigue  Eyes: No eye pain, visual disturbances, or discharge  ENMT:  No difficulty hearing, tinnitus; No sinus or throat pain  Neck: No pain or stiffness  Respiratory: No cough, wheezing, chills or hemoptysis  Cardiovascular: No chest pain, palpitations, shortness of breath, or leg swelling  Gastrointestinal: No abdominal pain. No nausea, vomiting or hematemesis; No diarrhea or constipation. Nohematochezia.  Genitourinary: No dysuria, frequency, hematuria or incontinence  Neurological: As per HPI  Skin: No itching, burning, rashes or lesions   Endocrine: No heat or cold intolerance; No hair loss  Musculoskeletal: No joint pain or swelling; No muscle, back or extremity pain  Heme/Lymph: No easy bruising or bleeding gums    MEDICATIONS  (STANDING):  atorvastatin 80 milliGRAM(s) Oral at bedtime    MEDICATIONS  (PRN):    Allergies    No Known Allergies    Intolerances      Vital Signs Last 24 Hrs  T(C): 36.2 (2023 16:24), Max: 36.2 (2023 16:24)  T(F): 97.1 (2023 16:24), Max: 97.1 (2023 16:24)  HR: 75 (2023 20:00) (68 - 75)  BP: 135/75 (2023 20:00) (135/75 - 179/84)  BP(mean): --  RR: 18 (2023 20:00) (18 - 20)  SpO2: 99% (2023 20:00) (95% - 99%)    Parameters below as of 2023 20:00  Patient On (Oxygen Delivery Method): room air        Physical exam:    Neurologic:  -Mental status: Awake, alert, oriented to person, place, and time. Speech is fluent with intact naming, repetition, and comprehension, no dysarthria. Recent and remote memory intact. Follows commands. Attention/concentration intact. Fund of knowledge appropriate.  -Cranial nerves:   II: Visual fields are full to confrontation.  III, IV, VI: Extraocular movements are intact without nystagmus. Pupils equally round and reactive to light  V:  Facial sensation V1-V3 equal and intact   VII: Face is symmetric with normal eye closure and smile  VIII: Hearing is bilaterally intact to finger rub  IX, X: Uvula is midline and soft palate rises symmetrically  XI: Head turning and shoulder shrug are intact.  XII: Tongue protrudes midline  Motor: Normal bulk and tone. L No pronator drift. Strength bilateral upper extremity 5/5, bilateral lower extremities 5/5.  Rapid alternating movements intact and symmetric  Sensation: Intact to light touch bilaterally.   Coordination: + dysmetria on finger-to-nose and heel-to-shin bilaterally over the L side  Reflexes: Downgoing toes bilaterally, no clonus, no alvarez  Gait: Narrow gait    NIHSS: 1    Fingerstick Blood Glucose: CAPILLARY BLOOD GLUCOSE        LABS:                        14.9   9.02  )-----------( 234      ( 2023 16:25 )             44.7     01-30    143  |  108  |  16  ----------------------------<  111<H>  4.6   |  22  |  0.9    Ca    8.6      2023 13:21    TPro  6.5  /  Alb  4.0  /  TBili  0.6  /  DBili  x   /  AST  16  /  ALT  8   /  AlkPhos  48      PT/INR - ( 2023 13:21 )   PT: TNP sec;   INR: TNP ratio         PTT - ( 2023 13:21 )  PTT:TNP sec  CARDIAC MARKERS ( 2023 19:26 )  x     / <0.01 ng/mL / x     / x     / x      CARDIAC MARKERS ( 2023 13:21 )  x     / <0.01 ng/mL / x     / x     / x          Urinalysis Basic - ( 2023 13:50 )    Color: Yellow / Appearance: Clear / S.018 / pH: x  Gluc: x / Ketone: Negative  / Bili: Negative / Urobili: <2 mg/dL   Blood: x / Protein: Trace / Nitrite: Negative   Leuk Esterase: Negative / RBC: x / WBC x   Sq Epi: x / Non Sq Epi: x / Bacteria: x        RADIOLOGY & ADDITIONAL STUDIES:    < from: CT Angio Neck w/ IV Cont (23 @ 15:24) >  IMPRESSION:    1.  Atheromatous plaque at the proximal right ICA producing 70-80%   stenosis.    2.  Atheromatous plaque of the left ICA origin with about 80% stenosis.    3.  Calcific plaque of the distal ICAs with moderate stenosis of the   right cavernous segment and moderate/severe stenoses of bilateral   supraclinoid segments.    4.  Multiple right upper lobe pulmonary nodules measuring up to 5 mm.   Dedicated chest CT may be obtained on an outpatient basis.    < end of copied text >  < from: CT Head No Cont (23 @ 15:22) >    1.  No evidence of acute intracranial hemorrhage.    2.  Right frontal and parietal lobe infarcts, chronic in appearance but   new since the prior head CT 2018. Correlate with medical history.

## 2023-01-30 NOTE — ED CDU PROVIDER INITIAL DAY NOTE - NS ED ATTENDING STATEMENT MOD
This was a shared visit with the KEREN. I reviewed and verified the documentation and independently performed the documented:

## 2023-01-30 NOTE — ED PROVIDER NOTE - CLINICAL SUMMARY MEDICAL DECISION MAKING FREE TEXT BOX
87y Male with no known PMHx presented to the hospital for dizziness. The patient endorses symptoms started 5 days ago. Dizziness described as a room spinning sensation. Dizziness seems to be episodic, triggered when moving from lying to sitting position and from sitting to standing. Each episode last for few minutes, and it's usually relieved by sitting or lying down. He has seen his PCP, who recommended brain MRI. This morning he went to the MRI appointment and had a similar episode after he got off the car. He denies falls, but reports he has been feeling off balance, and sometime uses sticks to ambulate around. He denies weakness numbness, difficulty finding words, double vision or loss of vision.  Neurology consulted.  Recommended Obs unit for workup.

## 2023-01-30 NOTE — ED CDU PROVIDER INITIAL DAY NOTE - OBJECTIVE STATEMENT
86yo male complaining of dizziness x 5 days. episode of dizziness this morning lasted longer prompting visit to ER. patient describes the dizziness as feeling unsteady. denies any nausea, vomiting, diaphoresis, vision changes. no relieving or exacerbating factors.

## 2023-01-30 NOTE — ED CDU PROVIDER INITIAL DAY NOTE - ATTENDING APP SHARED VISIT CONTRIBUTION OF CARE
I personally evaluated the patient. I reviewed the Resident’s or Physician Assistant’s note (as assigned above), and agree with the findings and plan except as documented in my note.   87-year-old man with past medical history significant for nephrolithiasis presenting with 5 days of intermittent dizziness.  Dizziness is associated with unsteady gait.  CT head and CTA head and neck results appreciated.  Neurology consult reviewed and recommendations appreciated.  NIHSS-1 for LUE pronator drift and LUE dysmetria. Aspirin and Plavix given.  All diagnostic testing reviewed.  All labs reviewed.  Chest x-ray with left lower lobe opacity.  Patient with no cough, fever, chills or URI symptoms.  CTA neck with left upper lobe pulmonary nodules.  Son and patient made aware.  Copies of the results given to the patient and son.  Patient instructed to follow-up with PMD for CT Chest.  Awaiting MRI brain and TTE.

## 2023-01-30 NOTE — ED PROVIDER NOTE - OBJECTIVE STATEMENT
was used for HPI.     Patient is an 87 year old male who denies any PMH presenting for dizziness. Patient states he had a sudden onset of dizziness while walking this morning and states he had a similar episode 5 days prior that subsided after 30 minutes. Patient endorses becoming concerned which is what prompted him to go to his PMD for evaluation. While at the PMD clinic, patient was told to come to the ED for further evaluation for suspected carotid stenosis. Patient denies any chest pain, shortness of breath, recent trauma, vision changes, weakness, slurred speech, smoking, or additional complaints.

## 2023-01-30 NOTE — CONSULT NOTE ADULT - ATTENDING COMMENTS
Pt is a 88 yo M with PMhx of renal calculi, who presents with c/o intermittent lightheadedness upon standing x 5 days. NIHSS 1 for mild LUE dysmetria. CTA head/neck with bilateral severe proximal ICA stenosis. MRI brain negative for acute ischemic stroke, however note chronic large right hemispheric ischemic stroke, new since CT head in 2018. While not acute symptomatic, right ICA stenosis seems to have been symptomatic at some point in the last 3-4 years. Current episode of transient lightheadedness with standing likely related orthostatic hypotension (recommend checking orthostats). PRIMO f/u for carotid stenosis. Recommend ASA/statin. F/u in neurology clinic in 2 weeks.

## 2023-01-31 PROCEDURE — 99222 1ST HOSP IP/OBS MODERATE 55: CPT

## 2023-01-31 PROCEDURE — 99233 SBSQ HOSP IP/OBS HIGH 50: CPT | Mod: FS

## 2023-01-31 PROCEDURE — 93306 TTE W/DOPPLER COMPLETE: CPT | Mod: 26

## 2023-01-31 RX ORDER — CLOPIDOGREL BISULFATE 75 MG/1
1 TABLET, FILM COATED ORAL
Qty: 0 | Refills: 0 | DISCHARGE

## 2023-01-31 RX ORDER — CLOPIDOGREL BISULFATE 75 MG/1
75 TABLET, FILM COATED ORAL EVERY 24 HOURS
Refills: 0 | Status: DISCONTINUED | OUTPATIENT
Start: 2023-01-31 | End: 2023-02-07

## 2023-01-31 RX ORDER — ASPIRIN/CALCIUM CARB/MAGNESIUM 324 MG
325 TABLET ORAL DAILY
Refills: 0 | Status: DISCONTINUED | OUTPATIENT
Start: 2023-01-31 | End: 2023-02-07

## 2023-01-31 RX ORDER — ONDANSETRON 8 MG/1
4 TABLET, FILM COATED ORAL EVERY 6 HOURS
Refills: 0 | Status: DISCONTINUED | OUTPATIENT
Start: 2023-01-31 | End: 2023-02-07

## 2023-01-31 RX ORDER — ASPIRIN/CALCIUM CARB/MAGNESIUM 324 MG
81 TABLET ORAL
Qty: 0 | Refills: 0 | DISCHARGE

## 2023-01-31 RX ORDER — ACETAMINOPHEN 500 MG
650 TABLET ORAL EVERY 6 HOURS
Refills: 0 | Status: DISCONTINUED | OUTPATIENT
Start: 2023-01-31 | End: 2023-02-07

## 2023-01-31 RX ORDER — PANTOPRAZOLE SODIUM 20 MG/1
40 TABLET, DELAYED RELEASE ORAL
Refills: 0 | Status: DISCONTINUED | OUTPATIENT
Start: 2023-01-31 | End: 2023-02-07

## 2023-01-31 RX ORDER — TICAGRELOR 90 MG/1
1 TABLET ORAL
Qty: 0 | Refills: 0 | DISCHARGE

## 2023-01-31 RX ADMIN — Medication 325 MILLIGRAM(S): at 20:40

## 2023-01-31 RX ADMIN — ATORVASTATIN CALCIUM 80 MILLIGRAM(S): 80 TABLET, FILM COATED ORAL at 22:27

## 2023-01-31 RX ADMIN — CLOPIDOGREL BISULFATE 75 MILLIGRAM(S): 75 TABLET, FILM COATED ORAL at 20:40

## 2023-01-31 NOTE — CONSULT NOTE ADULT - ASSESSMENT
IMPRESSION  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  Undefined cardiomyopathy on Echo wtih EF 38% and inferio-lateral hypokinesis  currently euvolemic, no HF exacerbation  --------------------------------------------------------------------------------------------  EKG: Sinus with IVCD  Troponins: negative X 2  METS > 4  RCRI = 2 Class III 10.1% risk of 30 day MACE    RECOMMENDATIONS  c/w aspirin and high intensity statins  start metoprolol 25mg BID and Entresto 24/26 BID  pt may need further w/up prior to the planned carotid intervention  pt is at high risk of MACE for intermediate risk procedure  will discuss with attending Language Line Maltese  ID: [id number]    IMPRESSION  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  Undefined cardiomyopathy on Echo wtih EF 38% and inferio-lateral hypokinesis  currently euvolemic, no HF exacerbation  --------------------------------------------------------------------------------------------  EKG: Sinus with IVCD  Troponins: negative X 2  METS > 4  RCRI = 2 Class III 10.1% risk of 30 day MACE    RECOMMENDATIONS  c/w aspirin and high intensity statins  start metoprolol 25mg BID and Entresto 24/26 BID  pt may need further w/up prior to the planned carotid intervention  pt is at high risk of MACE for intermediate risk procedure  will discuss with attending Language Line [place] Island[place]  ID: [id number]    IMPRESSION  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  Undefined cardiomyopathy on Echo wtih EF 38% and inferio-lateral hypokinesis  currently euvolemic, no HF exacerbation  --------------------------------------------------------------------------------------------  EKG: Sinus with IVCD  Troponins: negative X 2  METS > 4  RCRI = 2 Class III 10.1% risk of 30 day MACE    RECOMMENDATIONS  transfer to [facility]  c/w aspirin and high intensity statins  start metoprolol 25mg BID and Entresto 24/26 BID  pt may need further w/up prior to the planned carotid intervention  pt is at high risk of MACE for intermediate risk procedure  will discuss with attending

## 2023-01-31 NOTE — H&P ADULT - NSHPPHYSICALEXAM_GEN_ALL_CORE
GENERAL: NAD, lying in bed comfortably  HEAD:  Atraumatic, normocephalic  EYES: EOMI, PERRLA, conjunctiva and sclera clear  ENT: Moist mucous membranes  NECK: Supple, no JVD  HEART: Regular rate and rhythm, no murmurs, rubs, or gallops  LUNGS: Unlabored respirations.  Clear to auscultation bilaterally, no crackles, wheezing, or rhonchi  ABDOMEN: Soft, nontender, nondistended, +BS  EXTREMITIES: 2+ peripheral pulses bilaterally. No clubbing, cyanosis, or edema  NERVOUS SYSTEM:  A&Ox3, no dysarthria or aphasia, face symmetrical, PERRL, EOMI, visual fields full, moving all extremities antigravity, + slight pronator drift, sensation intact to light touch.  SKIN: No rashes or lesions

## 2023-01-31 NOTE — ED CDU PROVIDER DISPOSITION NOTE - CLINICAL COURSE
87-year-old man with past medical history significant for nephrolithiasis presenting with 5 days of intermittent dizziness.  Dizziness is associated with unsteady gait.  CT head and CTA head and neck results appreciated.  Neurology consult reviewed and recommendations appreciated.  NIHSS-1 for LUE pronator drift and LUE dysmetria. Aspirin and Plavix given.  All diagnostic testing reviewed.  All labs reviewed.  Chest x-ray with left lower lobe opacity.  Patient with no cough, fever, chills or URI symptoms.  CTA neck with left upper lobe pulmonary nodules.  Son and patient made aware.  Copies of the results given to the patient and son.  Patient instructed to follow-up with PMD for CT Chest.  Awaiting MRI brain results.  Patient to have TTE and neuro endovascular consult this AM.  Patient reports dizziness is improved.  Neuro exam nonfocal at this time. Pt evaluated by neurovascular and admitted to their service.

## 2023-01-31 NOTE — ED CDU PROVIDER DISPOSITION NOTE - ATTENDING APP SHARED VISIT CONTRIBUTION OF CARE
I personally evaluated the patient. I reviewed the Resident’s or Physician Assistant’s note (as assigned above), and agree with the findings and plan except as documented in my note.   87-year-old man with past medical history significant for nephrolithiasis presenting with 5 days of intermittent dizziness.  Dizziness is associated with unsteady gait.  CT head and CTA head and neck results appreciated.  Neurology consult reviewed and recommendations appreciated.  NIHSS-1 for LUE pronator drift and LUE dysmetria. Aspirin and Plavix given.  All diagnostic testing reviewed.  All labs reviewed.  Chest x-ray with left lower lobe opacity.  Patient with no cough, fever, chills or URI symptoms.  CTA neck with left upper lobe pulmonary nodules.  Son and patient made aware.  Copies of the results given to the patient and son.  Patient instructed to follow-up with PMD for CT Chest.  Awaiting MRI brain results.  Patient to have TTE and neuro endovascular consult this AM.  Patient reports dizziness is improved.  Neuro exam nonfocal at this time. Pt evaluated by neurovascular and admitted to their service.

## 2023-01-31 NOTE — ED CDU PROVIDER SUBSEQUENT DAY NOTE - ATTENDING APP SHARED VISIT CONTRIBUTION OF CARE
I personally evaluated the patient. I reviewed the Resident’s or Physician Assistant’s note (as assigned above), and agree with the findings and plan except as documented in my note.   87-year-old man with past medical history significant for nephrolithiasis presenting with 5 days of intermittent dizziness. I personally evaluated the patient. I reviewed the Resident’s or Physician Assistant’s note (as assigned above), and agree with the findings and plan except as documented in my note.   87-year-old man with past medical history significant for nephrolithiasis presenting with 5 days of intermittent dizziness.  Dizziness is associated with unsteady gait.  CT head and CTA head and neck results appreciated.  Neurology consult reviewed and recommendations appreciated.  NIHSS-1 for LUE pronator drift and LUE dysmetria. Aspirin and Plavix given.  All diagnostic testing reviewed.  All labs reviewed.  Chest x-ray with left lower lobe opacity.  Patient with no cough, fever, chills or URI symptoms.  CTA neck with left upper lobe pulmonary nodules.  Son and patient made aware.  Copies of the results given to the patient and son.  Patient instructed to follow-up with PMD for CT Chest.  Awaiting MRI brain results.  Patient to have TTE and neuro endovascular consult this AM.  Patient reports dizziness is improved.  Neuro exam nonfocal at this time.

## 2023-01-31 NOTE — H&P ADULT - ASSESSMENT
87-year-old Moroccan-speaking male with no significant past medical history who presented with positional dizziness x 5 days. 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 The patient was examined at bedside and has no acute complaints. The patient denies current dizziness.    Plan:  Admit under neurosurgery/ Dr. López for planned evaluation and possible stent recanalization of severe ICA stenosis as noted on CTA.    #Neuro:  - STAT CTH with changes in neurological exam/ status.    #CV:  - ECHO   - Cardiology consult and clearance for planned carotid stenting.    #Resp:  - HOB 15-20 degrees  - Aspiration precautions  - Keep SaO2 > 95%    #Renal/Fluid/Electrolytes:  - Strict I/Os  - Keep euvolemic  - Keep normonatremic   - Keep Magnesium level > 2  - Monitor lytes, replete as needed    #GI:  - Dysphagia screening/speech and swallow evaluation s/p procedure     #Heme/Onc:  - SCS while in bed    #Endo:  - Keep normoglycemic    #ID:  - Keep normothermic; avoid fevers    Code status: Full code  Disposition: Floor/ 3E       87-year-old [place] Islander-speaking male with no significant past medical history who presented with positional dizziness x 5 days. 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 The patient was examined at bedside and has no acute complaints. The patient denies current dizziness.    Plan:  Admit under neurosurgery/ Dr. López for planned evaluation and possible stent recanalization of severe ICA stenosis as noted on CTA.    #Neuro:  - STAT CTH with changes in neurological exam/ status.    #CV:  - ECHO   - Cardiology consult and clearance for planned carotid stenting.    #Resp:  - HOB 15-20 degrees  - Aspiration precautions  - Keep SaO2 > 95%    #Renal/Fluid/Electrolytes:  - Strict I/Os  - Keep euvolemic  - Keep normonatremic   - Keep Magnesium level > 2  - Monitor lytes, replete as needed    #GI:  - Bowel regimen    #Heme/Onc:  - SCS while in bed    #Endo:  - Keep normoglycemic    #ID:  - Keep normothermic; avoid fevers    Code status: Full code  Disposition: Floor/ 3E

## 2023-01-31 NOTE — CHART NOTE - NSCHARTNOTEFT_GEN_A_CORE
Patient to continue Plavix 75 mg PO tonight and  mg PO tonight per Dr. López pending PRU result tomorrow AM.   [id number] Neuroendovascular

## 2023-01-31 NOTE — CONSULT NOTE ADULT - ATTENDING COMMENTS
No cardiac history.  Comorbidities / risk factors as above.    Hospitalized with vertigo.  Neuroimaging demonstrated severe right ICA stenosis with multiple prior embolic infarcts in right ICA territory.  Plan for right carotid stent.    EKG: NSR, RBBB, LVH, Poss Inf Infarct  ECHO: Moderate LV dysfunction (EF 35-40%).  Basal inferior / inferoseptal aneurysm, mid inferior / inferoseptal akinesis.  Mild MR.  CATH: Multivessel CAD (sequential severe RCA stenosis, long 60% proximal to mid LAD stenosis, severe focal distal LAD and D1 stenosis.    Patient has good functional capacity for age without cardiac symptoms.  Hemodynamics stable.  Ruled-out MI.    Presenting symptoms are unlikely of cardiac etiology.  PCI prior to carotid stenting is unlikely to improve perioperative outcome.  Most of inferior wall supplied by RCA is likely not viable based on ECHO.  LAD disease not critical and intervention can be deferred.    Patient is elevated risk (RCRI = 2) for intermediate risk procedure. No cardiac history.  Comorbidities / risk factors as above.    Hospitalized with vertigo.  Neuroimaging demonstrated severe right ICA stenosis with multiple prior embolic infarcts in right ICA territory.  Plan for right carotid stent.    EKG: NSR, RBBB, LVH, Poss Inf Infarct  ECHO: Moderate LV dysfunction (EF 35-40%).  Basal inferior / inferoseptal aneurysm, mid inferior / inferoseptal akinesis.  Mild MR.  CATH: Multivessel CAD (sequential severe RCA stenosis, long 60% proximal to mid LAD stenosis, severe focal distal LAD and D1 stenosis.    Patient has good functional capacity for age without cardiac symptoms.  Hemodynamics stable.  Ruled-out MI.  Compensated / euvolemic.    Presenting symptoms are unlikely of cardiac etiology.  PCI prior to carotid stenting is unlikely to improve perioperative outcome.  Most of inferior wall supplied by RCA is likely not viable based on ECHO.  LAD disease not critical and intervention can be deferred.    Patient is elevated risk (RCRI = 2) for intermediate risk procedure.

## 2023-01-31 NOTE — PATIENT PROFILE ADULT - FALL HARM RISK - HARM RISK INTERVENTIONS
Assistance with ambulation/Communicate Risk of Fall with Harm to all staff/Monitor gait and stability/Reinforce activity limits and safety measures with patient and family/Review medications for side effects contributing to fall risk/Sit up slowly, dangle for a short time, stand at bedside before walking/Tailored Fall Risk Interventions/Toileting schedule using arm’s reach rule for commode and bathroom/Use of alarms - bed, chair and/or voice tab/Visual Cue: Yellow wristband and red socks/Bed in lowest position, wheels locked, appropriate side rails in place/Call bell, personal items and telephone in reach/Instruct patient to call for assistance before getting out of bed or chair/Non-slip footwear when patient is out of bed/[place] to call system/Physically safe environment - no spills, clutter or unnecessary equipment/Purposeful Proactive Rounding/Room/bathroom lighting operational, light cord in reach

## 2023-01-31 NOTE — H&P ADULT - HISTORY OF PRESENT ILLNESS
The patient is an 87-year-old Kyrgyz-speaking male former smoker (quit 12 years ago) with no significant past medical history who presented with positional dizziness (when getting up from seated position) x 5 days. Neurovascular was consulted and recommended Brain MRI w/without contrast and a neuroendovascular evaluation for right ICA stenosis on CTA. 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   Left ICA origin 80% stenosis, right proximal ICA 70-80% stenosis, and moderate-severe bilateral supraclinoid stenosis. The patient was examined at bedside and has no acute complaints.    The patient is an 87-year-old, Norwegian-speaking male with no significant past medical history (but reports to have not seen a doctor in over 2 years) who presented with positional dizziness (when getting up from seated position) x 5 days. 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 The patient was examined at bedside and has no acute complaints. The patient denies current dizziness.   The patient is an 87-year-old, [place]-speaking male with no significant past medical history (but reports to have not seen a doctor in over 2 years) who presented with positional dizziness (when getting up from seated position) x 5 days. 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 The patient was examined at bedside and has no acute complaints. The patient denies current dizziness.  [id number] used for this encounter.   The patient is an 87-year-old, Monegasque-speaking male with no significant past medical history (but reports to have not seen a doctor in over 2 years) who presented with positional dizziness (when getting up from seated position) x 5 days. 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 The patient was examined at bedside and has no acute complaints. The patient denies current dizziness, headache, nausea, numbness/tingling, vision changes, and unusual fatigue.   [id number] used for this encounter.

## 2023-01-31 NOTE — CONSULT NOTE ADULT - SUBJECTIVE AND OBJECTIVE BOX
HPI:  The patient is an 87-year-old, Samoan-speaking male with no significant past medical history (but reports to have not seen a doctor in over 2 years) who presented with positional dizziness (when getting up from seated position) x 5 days. 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 The patient was examined at bedside and has no acute complaints. The patient denies current dizziness, headache, nausea, numbness/tingling, vision changes, and unusual fatigue.   [id number] used for this encounter.   (2023 13:24)        PAST MEDICAL & SURGICAL HISTORY  Kidney stones    Urinary tract infection, E. coli    H/O lithotripsy        FAMILY HISTORY:  FAMILY HISTORY:  No pertinent family history in first degree relatives        SOCIAL HISTORY:  Social History:      ALLERGIES:  No Known Allergies      MEDICATIONS:  aspirin enteric coated 325 milliGRAM(s) Oral daily  atorvastatin 80 milliGRAM(s) Oral at bedtime  clopidogrel Tablet 75 milliGRAM(s) Oral every 24 hours  pantoprazole    Tablet 40 milliGRAM(s) Oral before breakfast    PRN:  acetaminophen     Tablet .. 650 milliGRAM(s) Oral every 6 hours PRN  ondansetron Injectable 4 milliGRAM(s) IV Push every 6 hours PRN      HOME MEDICATIONS:  Home Medications:  aspirin 81 mg oral tablet: 81 milligram(s) orally once a day (2023 21:01)  Plavix 75 mg oral tablet: 1 tab(s) orally once a day (2023 21:01)      VITALS:   T(F): 97.2 ( @ 16:21), Max: 97.6 ( @ 07:41)  HR: 70 ( @ 19:30) (68 - 77)  BP: 131/67 ( @ 19:30) (131/67 - 179/84)  BP(mean): --  RR: 18 ( @ 19:30) (16 - 20)  SpO2: 96% ( @ 19:30) (95% - 99%)    I&O's Summary      REVIEW OF SYSTEMS:  CONSTITUTIONAL: No weakness, fevers or chills  HEENT: No visual changes, neck/ear pain  RESPIRATORY: No cough, sob  CARDIOVASCULAR: See HPI  GASTROINTESTINAL: No abdominal pain. No nausea, vomiting, diarrhea   GENITOURINARY: No dysuria, frequency or hematuria  NEUROLOGICAL: see HPI  SKIN: No new rashes    PHYSICAL EXAM:  General: Not in distress.  Non-toxic appearing.   HEENT: EOMI  Cardio: regular, S1, S2, no murmur  Pulm: B/L BS.  No wheezing / crackles / rales  Abdomen: Soft, non-tender, non-distended. Normoactive bowel sounds  Extremities: No edema b/l le  Neuro: A&O x3. No focal deficits    LABS:                        14.9   9.02  )-----------( 234      ( 2023 16:25 )             44.7         143  |  108  |  16  ----------------------------<  111<H>  4.6   |  22  |  0.9    Ca    8.6      2023 13:21    TPro  6.5  /  Alb  4.0  /  TBili  0.6  /  DBili  x   /  AST  16  /  ALT  8   /  AlkPhos  48      PT/INR - ( 2023 13:21 )   PT: TNP sec;   INR: TNP ratio         PTT - ( 2023 13:21 )  PTT:TNP sec    CARDIAC MARKERS ( 2023 19:26 )  x     / <0.01 ng/mL / x     / x     / x      CARDIAC MARKERS ( 2023 13:21 )  x     / <0.01 ng/mL / x     / x     / x            Troponin trend:        COVID-19 PCR: NotDetec (2023 13:37)      RADIOLOGY:   < from: MR Head No Cont (23 @ 22:18) >  IMPRESSION:    1.  No acute infarct or intracranial hemorrhage.    2.  Mild chronic microvascular changes and chronicinfarcts in the right   parietal lobe.    3.  Left vertebral artery signal abnormality consistent with severe V4   segment stenoses as correlated with CTA.    < end of copied text >    -TTE: < from: TTE Echo Complete w/o Contrast w/ Doppler (23 @ 13:34) >      Summary:   1. LV Ejection Fraction by Modi's Method with a biplane EF of 38 %.   2. Moderately to severely decreased global left ventricular systolic   function.   3. Basal inferior segment, mid inferior segment, basal inferolateral   segment, and mid inferolateral segment are abnormal as described above.   4. Spectral Doppler shows impaired relaxation pattern of left   ventricular myocardial filling (Grade I diastolic dysfunction).   5. Mild mitral valve regurgitation.   6. Mild aortic regurgitation.   7. Normal left atrial size.   8. Normal right atrial size.   9. Dilatation of the aortic root.    < end of copied text >    -CCTA: N/A  -STRESS TEST: N/A  -CATHETERIZATION: N/A  -OTHER:  EC Lead ECG:   Ventricular Rate 67 BPM    Atrial Rate 67 BPM    P-R Interval 166 ms    QRS Duration 142 ms    Q-T Interval 452 ms    QTC Calculation(Bazett) 477 ms    P Axis 51 degrees    R Axis -81 degrees    T Axis 1 degrees    Diagnosis Line Normal sinus rhythm  Non-specific intra-ventricular conduction block  Moderate voltage criteria for LVH, may be normal variant  Abnormal ECG    Confirmed by Kirsten Wilson MD ([id number]) on 2023 8:22:54 AM ( @ 19:11)     HPI:  The patient is an 87-year-old, Zambian-speaking male with no significant past medical history (but reports to have not seen a doctor in over 2 years) who presented with positional dizziness (when getting up from seated position) x 5 days. 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 The patient was examined at bedside and has no acute complaints. The patient denies current dizziness, headache, nausea, numbness/tingling, vision changes, and unusual fatigue.   [id number] used for this encounter.   (2023 13:24)        PAST MEDICAL & SURGICAL HISTORY  Kidney stones    Urinary tract infection, E. coli    H/O lithotripsy        FAMILY HISTORY:  FAMILY HISTORY:  No pertinent family history in first degree relatives        SOCIAL HISTORY:  Social History:      ALLERGIES:  No Known Allergies      MEDICATIONS:  aspirin enteric coated 325 milliGRAM(s) Oral daily  atorvastatin 80 milliGRAM(s) Oral at bedtime  clopidogrel Tablet 75 milliGRAM(s) Oral every 24 hours  pantoprazole    Tablet 40 milliGRAM(s) Oral before breakfast    PRN:  acetaminophen     Tablet .. 650 milliGRAM(s) Oral every 6 hours PRN  ondansetron Injectable 4 milliGRAM(s) IV Push every 6 hours PRN      HOME MEDICATIONS:  Home Medications:  aspirin 81 mg oral tablet: 81 milligram(s) orally once a day (2023 21:01)  Plavix 75 mg oral tablet: 1 tab(s) orally once a day (2023 21:01)      VITALS:   T(F): 97.2 ( @ 16:21), Max: 97.6 ( @ 07:41)  HR: 70 ( @ 19:30) (68 - 77)  BP: 131/67 ( @ 19:30) (131/67 - 179/84)  BP(mean): --  RR: 18 ( @ 19:30) (16 - 20)  SpO2: 96% ( @ 19:30) (95% - 99%)    I&O's Summary      REVIEW OF SYSTEMS:  CONSTITUTIONAL: No weakness, fevers or chills  HEENT: No visual changes, neck/ear pain  RESPIRATORY: No cough, sob  CARDIOVASCULAR: See HPI  GASTROINTESTINAL: No abdominal pain. No nausea, vomiting, diarrhea   GENITOURINARY: No dysuria, frequency or hematuria  NEUROLOGICAL: see HPI  SKIN: No new rashes    PHYSICAL EXAM:  General: Not in distress.  Non-toxic appearing.   HEENT: EOMI  Cardio: regular, S1, S2   Pulm: B/L BS.  No wheezing / crackles / rales  Abdomen: Soft, non-tender, non-distended. Normoactive bowel sounds  Extremities: No edema b/l le  Neuro: A&O x3. No focal deficits    LABS:                        14.9   9.02  )-----------( 234      ( 2023 16:25 )             44.7         143  |  108  |  16  ----------------------------<  111<H>  4.6   |  22  |  0.9    Ca    8.6      2023 13:21    TPro  6.5  /  Alb  4.0  /  TBili  0.6  /  DBili  x   /  AST  16  /  ALT  8   /  AlkPhos  48      PT/INR - ( 2023 13:21 )   PT: TNP sec;   INR: TNP ratio         PTT - ( 2023 13:21 )  PTT:TNP sec    CARDIAC MARKERS ( 2023 19:26 )  x     / <0.01 ng/mL / x     / x     / x      CARDIAC MARKERS ( 2023 13:21 )  x     / <0.01 ng/mL / x     / x     / x            Troponin trend:        COVID-19 PCR: NotDetec (2023 13:37)      RADIOLOGY:   < from: MR Head No Cont (23 @ 22:18) >  IMPRESSION:    1.  No acute infarct or intracranial hemorrhage.    2.  Mild chronic microvascular changes and chronicinfarcts in the right   parietal lobe.    3.  Left vertebral artery signal abnormality consistent with severe V4   segment stenoses as correlated with CTA.    < end of copied text >    -TTE: < from: TTE Echo Complete w/o Contrast w/ Doppler (23 @ 13:34) >      Summary:   1. LV Ejection Fraction by Modi's Method with a biplane EF of 38 %.   2. Moderately to severely decreased global left ventricular systolic   function.   3. Basal inferior segment, mid inferior segment, basal inferolateral   segment, and mid inferolateral segment are abnormal as described above.   4. Spectral Doppler shows impaired relaxation pattern of left   ventricular myocardial filling (Grade I diastolic dysfunction).   5. Mild mitral valve regurgitation.   6. Mild aortic regurgitation.   7. Normal left atrial size.   8. Normal right atrial size.   9. Dilatation of the aortic root.    < end of copied text >    -CCTA: N/A  -STRESS TEST: N/A  -CATHETERIZATION: N/A  -OTHER:  EC Lead ECG:   Ventricular Rate 67 BPM    Atrial Rate 67 BPM    P-R Interval 166 ms    QRS Duration 142 ms    Q-T Interval 452 ms    QTC Calculation(Bazett) 477 ms    P Axis 51 degrees    R Axis -81 degrees    T Axis 1 degrees    Diagnosis Line Normal sinus rhythm  Non-specific intra-ventricular conduction block  Moderate voltage criteria for LVH, may be normal variant  Abnormal ECG    Confirmed by Kirsten Wilson MD ([id number]) on 2023 8:22:54 AM ( @ 19:11)

## 2023-01-31 NOTE — CONSULT NOTE ADULT - SUBJECTIVE AND OBJECTIVE BOX
Neuroendovascular Consult:   Consulted for:      HPI:  The patient is an 87-year-old Scottish-speaking male former smoker (quit 12 years ago) with no significant past medical history who presented with positional dizziness (when getting up from seated position) x 5 days. Neurovascular was consulted and recommended Brain MRI w/without contrast and a neuroendovascular evaluation for right ICA stenosis on CTA. 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   Left ICA origin 80% stenosis, right proximal ICA 70-80% stenosis, and moderate-severe bilateral supraclinoid stenosis. The patient was examined at bedside and has no acute complaints. The patient refused an , and some assistance in obtaining history was provided by son.    PAST MEDICAL & SURGICAL HISTORY:  Kidney stones  Urinary tract infection, E. coli  H/O lithotripsy    MEDICATIONS  (STANDING):  atorvastatin 80 milliGRAM(s) Oral at bedtime  MEDICATIONS  (PRN):    Allergies  No Known Allergies    Social History:   Smoking: Yes [ ]  No [X]   ______pk yrs Former smoker  ETOH  Yes [ ]  No [ ]  Social [X]  DRUGS:  Yes [ ]  No [X]  if so what______________    FAMILY HISTORY:  No pertinent family history in first degree relatives    Physical Exam:   Vital Signs Last 24 Hrs  T(C): 36.4 (31 Jan 2023 07:41), Max: 36.4 (31 Jan 2023 07:41)  T(F): 97.6 (31 Jan 2023 07:41), Max: 97.6 (31 Jan 2023 07:41)  HR: 72 (31 Jan 2023 07:41) (68 - 75)  BP: 137/65 (31 Jan 2023 07:41) (135/75 - 179/84)  BP(mean): --  RR: 18 (31 Jan 2023 07:41) (16 - 18)  SpO2: 98% (31 Jan 2023 07:41) (96% - 99%)    Parameters below as of 31 Jan 2023 07:41  Patient On (Oxygen Delivery Method): room air    General:  NAD  Neuro: Alert and oriented x 3, following commands, face symmetrical, V1-3 intact, PERRL, EOMI, visual fields full, +slight left dysmetria on FTN, moving all extremities antigravity (uses cane for LE at baseline), sensation intact to light touch throughout.  NIHSS 1 for dysmetria    Labs:                         14.9   9.02  )-----------( 234      ( 30 Jan 2023 16:25 )             44.7     01-30    143  |  108  |  16  ----------------------------<  111<H>  4.6   |  22  |  0.9    Ca    8.6      30 Jan 2023 13:21    TPro  6.5  /  Alb  4.0  /  TBili  0.6  /  DBili  x   /  AST  16  /  ALT  8   /  AlkPhos  48  01-30    PT/INR - ( 30 Jan 2023 13:21 )   PT: TNP sec;   INR: TNP ratio         PTT - ( 30 Jan 2023 13:21 )  PTT:TNP sec    Pertinent labs:                      14.9   9.02  )-----------( 234      ( 30 Jan 2023 16:25 )             44.7       01-30    143  |  108  |  16  ----------------------------<  111<H>  4.6   |  22  |  0.9    Ca    8.6      30 Jan 2023 13:21    TPro  6.5  /  Alb  4.0  /  TBili  0.6  /  DBili  x   /  AST  16  /  ALT  8   /  AlkPhos  48  01-30      PT/INR - ( 30 Jan 2023 13:21 )   PT: TNP sec;   INR: TNP ratio         PTT - ( 30 Jan 2023 13:21 )  PTT:TNP sec    Radiology & Additional Studies:   Radiology imaging reviewed.     Assessment:    The patient is an 87-year-old Scottish-speaking male former smoker (quit 12 years ago) with no significant past medical history who presented with positional dizziness (when getting up from seated position) x 5 days. Neurovascular was consulted and recommended Brain MRI w/without contrast and a neuroendovascular evaluation for right ICA stenosis on CTA. 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   Left ICA origin 80% stenosis, right proximal ICA 70-80% stenosis, and moderate-severe bilateral supraclinoid stenosis. The patient was examined at bedside and has no acute complaints. The patient refused an , and some assistance in obtaining history was provided by son.    Suggestions:     ANUJ Neuroendovascular Consult:   Consulted for:      HPI:  The patient is an 87-year-old Nigerien-speaking male former smoker (quit 12 years ago) with no significant past medical history who presented with positional dizziness (when getting up from seated position) x 5 days. Neurovascular was consulted and recommended Brain MRI w/without contrast and a neuroendovascular evaluation for right ICA stenosis on CTA. 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   Left ICA origin 80% stenosis, right proximal ICA 70-80% stenosis, and moderate-severe bilateral supraclinoid stenosis. The patient was examined at bedside and has no acute complaints. The patient refused an , and some assistance in obtaining history was provided by son.    PAST MEDICAL & SURGICAL HISTORY:  Kidney stones  Urinary tract infection, E. coli  H/O lithotripsy    MEDICATIONS  (STANDING):  atorvastatin 80 milliGRAM(s) Oral at bedtime  MEDICATIONS  (PRN):    Allergies  No Known Allergies    Social History:   Smoking: Yes [ ]  No [X]   ______pk yrs Former smoker  ETOH  Yes [ ]  No [ ]  Social [X]  DRUGS:  Yes [ ]  No [X]  if so what______________    FAMILY HISTORY:  No pertinent family history in first degree relatives    Physical Exam:   Vital Signs Last 24 Hrs  T(C): 36.4 (31 Jan 2023 07:41), Max: 36.4 (31 Jan 2023 07:41)  T(F): 97.6 (31 Jan 2023 07:41), Max: 97.6 (31 Jan 2023 07:41)  HR: 72 (31 Jan 2023 07:41) (68 - 75)  BP: 137/65 (31 Jan 2023 07:41) (135/75 - 179/84)  BP(mean): --  RR: 18 (31 Jan 2023 07:41) (16 - 18)  SpO2: 98% (31 Jan 2023 07:41) (96% - 99%)    Parameters below as of 31 Jan 2023 07:41  Patient On (Oxygen Delivery Method): room air    General:  NAD  Neuro: Alert and oriented x 3, following commands, face symmetrical, V1-3 intact, PERRL, EOMI, visual fields full, +slight left dysmetria on FTN, moving all extremities antigravity (uses cane for LE at baseline), sensation intact to light touch throughout.  NIHSS 1 for dysmetria    Labs:                         14.9   9.02  )-----------( 234      ( 30 Jan 2023 16:25 )             44.7     01-30    143  |  108  |  16  ----------------------------<  111<H>  4.6   |  22  |  0.9    Ca    8.6      30 Jan 2023 13:21    TPro  6.5  /  Alb  4.0  /  TBili  0.6  /  DBili  x   /  AST  16  /  ALT  8   /  AlkPhos  48  01-30    PT/INR - ( 30 Jan 2023 13:21 )   PT: TNP sec;   INR: TNP ratio         PTT - ( 30 Jan 2023 13:21 )  PTT:TNP sec    Pertinent labs:                      14.9   9.02  )-----------( 234      ( 30 Jan 2023 16:25 )             44.7       01-30    143  |  108  |  16  ----------------------------<  111<H>  4.6   |  22  |  0.9    Ca    8.6      30 Jan 2023 13:21    TPro  6.5  /  Alb  4.0  /  TBili  0.6  /  DBili  x   /  AST  16  /  ALT  8   /  AlkPhos  48  01-30      PT/INR - ( 30 Jan 2023 13:21 )   PT: TNP sec;   INR: TNP ratio         PTT - ( 30 Jan 2023 13:21 )  PTT:TNP sec    Radiology & Additional Studies:   Radiology imaging reviewed.     Assessment:    The patient is an 87-year-old Nigerien-speaking male former smoker (quit 12 years ago) with no significant past medical history who presented with positional dizziness (when getting up from seated position) x 5 days. Neurovascular was consulted and recommended Brain MRI w/without contrast and a neuroendovascular evaluation for right ICA stenosis on CTA. 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   Left ICA origin 80% stenosis, right proximal ICA 70-80% stenosis, and moderate-severe bilateral supraclinoid stenosis. The patient was examined at bedside and has no acute complaints. The patient refused an , and some assistance in obtaining history was provided by son.    Suggestions:   - Recommending admission under Dr. López  - Tentative plan for a diagnostic cerebral angiogram + right carotid artery stent pending cardiology evaluation/ clearance and further neurovascular workup  - Patient should be started on Brilinta 90 mg bid and Aspirin 81 mg qd today  [id number] with questions/ concerns

## 2023-01-31 NOTE — PATIENT PROFILE ADULT - NS PRO AD NO ADVANCE DIRECTIVE
CP Rehab Note: chart review Admit: cath with stent (Hx from cards note on 12/26/18: This is a 56 y.o. male with pmhx HTN, DM, HLD, ASHD s/p MANJINDER to RCA 12/10/18 & unsuccessful  to mid LAD which was done at [facility]. Today, he reports some intermittent mild chest tightness, delaney.) Attempt to see patient, provided CAD folder. Patient discussed his previous cath and we noticed blood at TR band. CP Rehab alerted nursing who is at bedside not. Attempt to follow up with patient later but was discharged. Patient lives in [place] and will be following up with cardiology there. No

## 2023-01-31 NOTE — ED CDU PROVIDER SUBSEQUENT DAY NOTE - PROGRESS NOTE DETAILS
Patient comfortable, awaiting echo. Patient and son advised of pulmonary nodules and need for f/u. Copies of results given for f/u. PEr MARIA G Kang, admit to Dr López

## 2023-02-01 LAB
ALBUMIN SERPL ELPH-MCNC: 4 G/DL — SIGNIFICANT CHANGE UP (ref 3.5–5.2)
ALP SERPL-CCNC: 53 U/L — SIGNIFICANT CHANGE UP (ref 30–115)
ALT FLD-CCNC: 8 U/L — SIGNIFICANT CHANGE UP (ref 0–41)
ANION GAP SERPL CALC-SCNC: 12 MMOL/L — SIGNIFICANT CHANGE UP (ref 7–14)
ANION GAP SERPL CALC-SCNC: 13 MMOL/L — SIGNIFICANT CHANGE UP (ref 7–14)
APTT BLD: 28 SEC — SIGNIFICANT CHANGE UP (ref 27–39.2)
AST SERPL-CCNC: 14 U/L — SIGNIFICANT CHANGE UP (ref 0–41)
BILIRUB SERPL-MCNC: 1.1 MG/DL — SIGNIFICANT CHANGE UP (ref 0.2–1.2)
BLD GP AB SCN SERPL QL: SIGNIFICANT CHANGE UP
BUN SERPL-MCNC: 17 MG/DL — SIGNIFICANT CHANGE UP (ref 10–20)
BUN SERPL-MCNC: 18 MG/DL — SIGNIFICANT CHANGE UP (ref 10–20)
CALCIUM SERPL-MCNC: 8.9 MG/DL — SIGNIFICANT CHANGE UP (ref 8.4–10.5)
CALCIUM SERPL-MCNC: 9 MG/DL — SIGNIFICANT CHANGE UP (ref 8.4–10.5)
CHLORIDE SERPL-SCNC: 104 MMOL/L — SIGNIFICANT CHANGE UP (ref 98–110)
CHLORIDE SERPL-SCNC: 105 MMOL/L — SIGNIFICANT CHANGE UP (ref 98–110)
CO2 SERPL-SCNC: 22 MMOL/L — SIGNIFICANT CHANGE UP (ref 17–32)
CO2 SERPL-SCNC: 24 MMOL/L — SIGNIFICANT CHANGE UP (ref 17–32)
CREAT SERPL-MCNC: 1.1 MG/DL — SIGNIFICANT CHANGE UP (ref 0.7–1.5)
CREAT SERPL-MCNC: 1.1 MG/DL — SIGNIFICANT CHANGE UP (ref 0.7–1.5)
EGFR: 66 ML/MIN/1.73M2 — SIGNIFICANT CHANGE UP
EGFR: 66 ML/MIN/1.73M2 — SIGNIFICANT CHANGE UP
GLUCOSE SERPL-MCNC: 118 MG/DL — HIGH (ref 70–99)
GLUCOSE SERPL-MCNC: 145 MG/DL — HIGH (ref 70–99)
HCT VFR BLD CALC: 47.6 % — SIGNIFICANT CHANGE UP (ref 42–52)
HCT VFR BLD CALC: 47.7 % — SIGNIFICANT CHANGE UP (ref 42–52)
HGB BLD-MCNC: 15.9 G/DL — SIGNIFICANT CHANGE UP (ref 14–18)
HGB BLD-MCNC: 16 G/DL — SIGNIFICANT CHANGE UP (ref 14–18)
INR BLD: 1.08 RATIO — SIGNIFICANT CHANGE UP (ref 0.65–1.3)
MAGNESIUM SERPL-MCNC: 2.1 MG/DL — SIGNIFICANT CHANGE UP (ref 1.8–2.4)
MCHC RBC-ENTMCNC: 31.3 PG — HIGH (ref 27–31)
MCHC RBC-ENTMCNC: 31.6 PG — HIGH (ref 27–31)
MCHC RBC-ENTMCNC: 33.3 G/DL — SIGNIFICANT CHANGE UP (ref 32–37)
MCHC RBC-ENTMCNC: 33.6 G/DL — SIGNIFICANT CHANGE UP (ref 32–37)
MCV RBC AUTO: 93.9 FL — SIGNIFICANT CHANGE UP (ref 80–94)
MCV RBC AUTO: 94.1 FL — HIGH (ref 80–94)
NRBC # BLD: 0 /100 WBCS — SIGNIFICANT CHANGE UP (ref 0–0)
NRBC # BLD: 0 /100 WBCS — SIGNIFICANT CHANGE UP (ref 0–0)
PHOSPHATE SERPL-MCNC: 3.6 MG/DL — SIGNIFICANT CHANGE UP (ref 2.1–4.9)
PLATELET # BLD AUTO: 234 K/UL — SIGNIFICANT CHANGE UP (ref 130–400)
PLATELET # BLD AUTO: 235 K/UL — SIGNIFICANT CHANGE UP (ref 130–400)
POTASSIUM SERPL-MCNC: 4 MMOL/L — SIGNIFICANT CHANGE UP (ref 3.5–5)
POTASSIUM SERPL-MCNC: 4.3 MMOL/L — SIGNIFICANT CHANGE UP (ref 3.5–5)
POTASSIUM SERPL-SCNC: 4 MMOL/L — SIGNIFICANT CHANGE UP (ref 3.5–5)
POTASSIUM SERPL-SCNC: 4.3 MMOL/L — SIGNIFICANT CHANGE UP (ref 3.5–5)
PROT SERPL-MCNC: 6.8 G/DL — SIGNIFICANT CHANGE UP (ref 6–8)
PROTHROM AB SERPL-ACNC: 12.3 SEC — SIGNIFICANT CHANGE UP (ref 9.95–12.87)
RBC # BLD: 5.06 M/UL — SIGNIFICANT CHANGE UP (ref 4.7–6.1)
RBC # BLD: 5.08 M/UL — SIGNIFICANT CHANGE UP (ref 4.7–6.1)
RBC # FLD: 13.8 % — SIGNIFICANT CHANGE UP (ref 11.5–14.5)
RBC # FLD: 14 % — SIGNIFICANT CHANGE UP (ref 11.5–14.5)
SODIUM SERPL-SCNC: 140 MMOL/L — SIGNIFICANT CHANGE UP (ref 135–146)
SODIUM SERPL-SCNC: 140 MMOL/L — SIGNIFICANT CHANGE UP (ref 135–146)
WBC # BLD: 10.36 K/UL — SIGNIFICANT CHANGE UP (ref 4.8–10.8)
WBC # BLD: 9.8 K/UL — SIGNIFICANT CHANGE UP (ref 4.8–10.8)
WBC # FLD AUTO: 10.36 K/UL — SIGNIFICANT CHANGE UP (ref 4.8–10.8)
WBC # FLD AUTO: 9.8 K/UL — SIGNIFICANT CHANGE UP (ref 4.8–10.8)

## 2023-02-01 PROCEDURE — 99222 1ST HOSP IP/OBS MODERATE 55: CPT

## 2023-02-01 PROCEDURE — 99221 1ST HOSP IP/OBS SF/LOW 40: CPT

## 2023-02-01 PROCEDURE — 93454 CORONARY ARTERY ANGIO S&I: CPT | Mod: 26

## 2023-02-01 PROCEDURE — 93010 ELECTROCARDIOGRAM REPORT: CPT

## 2023-02-01 RX ORDER — METOPROLOL TARTRATE 50 MG
25 TABLET ORAL
Refills: 0 | Status: DISCONTINUED | OUTPATIENT
Start: 2023-02-01 | End: 2023-02-02

## 2023-02-01 RX ORDER — METOPROLOL TARTRATE 50 MG
25 TABLET ORAL DAILY
Refills: 0 | Status: DISCONTINUED | OUTPATIENT
Start: 2023-02-01 | End: 2023-02-01

## 2023-02-01 RX ORDER — SACUBITRIL AND VALSARTAN 24; 26 MG/1; MG/1
1 TABLET, FILM COATED ORAL
Refills: 0 | Status: DISCONTINUED | OUTPATIENT
Start: 2023-02-01 | End: 2023-02-02

## 2023-02-01 RX ADMIN — Medication 325 MILLIGRAM(S): at 13:23

## 2023-02-01 RX ADMIN — ATORVASTATIN CALCIUM 80 MILLIGRAM(S): 80 TABLET, FILM COATED ORAL at 22:22

## 2023-02-01 RX ADMIN — PANTOPRAZOLE SODIUM 40 MILLIGRAM(S): 20 TABLET, DELAYED RELEASE ORAL at 06:01

## 2023-02-01 NOTE — CONSULT NOTE ADULT - SUBJECTIVE AND OBJECTIVE BOX
LOIS KANG  87y, Male  Allergy: No Known Allergies    Hospital Day: 1d    as per initial HP  The patient is an 87-year-old, Guatemalan-speaking male with no significant past medical history (but reports to have not seen a doctor in over 2 years) who presented with positional dizziness (when getting up from seated position) x 5 days. 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 The patient was examined at bedside and has no acute complaints. The patient denies current dizziness, headache, nausea, numbness/tingling, vision changes, and unusual fatigue.   [id number] used for this encounter.    HP  Patient seen at bedside. son also present who helped with translation. as per son patient usually does not follow any PCP. Previously 10 years ago he had some kidney stones. patient now presented with some dizziness episodes, currently while lying down patient does not report any dizziness. Usually happens when patient is moving.   admitted under neurosurgery neurology and medical team was consulted. no chest pain, burning [id number], constipation, diarrhea or any other medical problems     Patient seen and examined earlier today.     PMH/PSH:  PAST MEDICAL & SURGICAL HISTORY:  Kidney stones      Urinary tract infection, E. coli      H/O lithotripsy          LAST 24-Hr EVENTS:    VITALS:  T(F): 96.7 (02-01-23 @ 12:32), Max: 97.2 (01-31-23 @ 16:21)  HR: 84 (02-01-23 @ 12:32)  BP: 135/71 (02-01-23 @ 12:32) (131/67 - 161/92)  RR: 18 (02-01-23 @ 12:32)  SpO2: 96% (01-31-23 @ 19:30)          TESTS & MEASUREMENTS:  Weight/BMI                            16.0   10.36 )-----------( 234      ( 01 Feb 2023 11:35 )             47.6     PT/INR - ( 01 Feb 2023 11:35 )   PT: 12.30 sec;   INR: 1.08 ratio         PTT - ( 01 Feb 2023 11:35 )  PTT:28.0 sec  INR: 1.08 ratio (02-01-23 @ 11:35)  INR: TNP ratio (01-30-23 @ 13:21)    02-01    140  |  104  |  18  ----------------------------<  145<H>  4.0   |  24  |  1.1    Ca    9.0      01 Feb 2023 11:35  Phos  3.6     02-01  Mg     2.1     02-01    TPro  6.8  /  Alb  4.0  /  TBili  1.1  /  DBili  x   /  AST  14  /  ALT  8   /  AlkPhos  53  02-01    LIVER FUNCTIONS - ( 01 Feb 2023 08:24 )  Alb: 4.0 g/dL / Pro: 6.8 g/dL / ALK PHOS: 53 U/L / ALT: 8 U/L / AST: 14 U/L / GGT: x           CARDIAC MARKERS ( 30 Jan 2023 19:26 )  x     / <0.01 ng/mL / x     / x     / x                      COVID-19 PCR: NotDetec (01-30-23 @ 13:37)                RADIOLOGY, ECG, & ADDITIONAL TESTS:  12 Lead ECG:   Ventricular Rate 67 BPM    Atrial Rate 67 BPM    P-R Interval 166 ms    QRS Duration 142 ms    Q-T Interval 452 ms    QTC Calculation(Bazett) 477 ms    P Axis 51 degrees    R Axis -81 degrees    T Axis 1 degrees    Diagnosis Line Normal sinus rhythm  Non-specific intra-ventricular conduction block  Moderate voltage criteria for LVH, may be normal variant  Abnormal ECG    Confirmed by Kirsten Wilson MD ([id number]) on 1/31/2023 8:22:54 AM (01-30-23 @ 19:11)    CT Head No Cont:   ACC: [id number] EXAM:  CT BRAIN   ORDERED BY: FRITZ WATSON     PROCEDURE DATE:  01/30/2023          INTERPRETATION:  Clinical History / Reason for exam: Acute dizziness    Technique: Noncontrast head CT.  Contiguous unenhanced CT axial images of   the head from the base to the vertex with coronal and sagittal reformats.    Comparison: CT head dated 7/8/2018.    Findings:    The ventricles and cortical sulci are compatible with a moderate degree   of parenchymal volume loss. A cavum septum pellucidum and vergae are   noted, normal variation.    There are small infarcts within the right frontal and parietal lobes   which are new since the prior head CT but demonstrate volume loss and are   therefore likely chronic.    There are patchy hypodensities throughout the hemispheric white matter   without mass effect compatible with mild chronic microvascular changes.    There is no acute intracranial hemorrhage, extra-axial fluid collection   or midline shift.    Vascular calcifications are noted.    The visualized paranasal sinuses and mastoids are clear.    IMPRESSION:    1.  No evidence of acute intracranial hemorrhage.    2.  Right frontal and parietal lobe infarcts, chronic in appearance but   new since the prior head CT 7/8/2018. Correlate with medical history.    --- End of Report ---            COREY REYES MD; Attending Radiologist  This document has been electronically signed. Jan 30 2023  3:36PM (01-30-23 @ 15:22)    RECENT DIAGNOSTIC ORDERS:  Cardiac Catheterization (02-01-23 @ 12:57)  Diet, NPO:   Except Medications (02-01-23 @ 09:19)  ABO Rh Confirmatory Specimen: Routine  Addl Info: Conditional: ABO Rh Confirmatory Specimen (02-01-23 @ 06:54)  A1C with Estimated Average Glucose: Routine (01-31-23 @ 19:10)  Prothrombin Time and INR, Plasma:  Start Date:31-Jan-2023. Routine (01-31-23 @ 17:32)      MEDICATIONS:  MEDICATIONS  (STANDING):  aspirin enteric coated 325 milliGRAM(s) Oral daily  atorvastatin 80 milliGRAM(s) Oral at bedtime  clopidogrel Tablet 75 milliGRAM(s) Oral every 24 hours  metoprolol tartrate 25 milliGRAM(s) Oral two times a day  pantoprazole    Tablet 40 milliGRAM(s) Oral before breakfast  sacubitril 24 mG/valsartan 26 mG 1 Tablet(s) Oral two times a day    MEDICATIONS  (PRN):  acetaminophen     Tablet .. 650 milliGRAM(s) Oral every 6 hours PRN Temp greater or equal to 38C (100.4F), Mild Pain (1 - 3)  ondansetron Injectable 4 milliGRAM(s) IV Push every 6 hours PRN Nausea and/or Vomiting      HOME MEDICATIONS:  aspirin 81 mg oral tablet (01-31)  Plavix 75 mg oral tablet (01-31)      PHYSICAL EXAM:  GENERAL: Patient lying on bed, comfortable   CHEST/LUNG: NVB, no wheezing   HEART: R1+R2, RRR  ABDOMEN: Soft. non tender, BS positive  EXTREMITIES:  no edema, Bruising  CNS: AAAx4. No cranial nerves deficit.

## 2023-02-01 NOTE — CHART NOTE - NSCHARTNOTEFT_GEN_A_CORE
Per conversation between Dr. López and cardiology team, patient pending coronary catheter angiogram today for pre-carotid stenting cardiac clearance.   Patient made NPO except meds.   Awaiting message back from cardiology team.   Formal progress note to follow. Per conversation between Dr. López and cardiology team, patient pending coronary catheter angiogram today for pre-carotid stenting cardiac clearance.   Patient made NPO except meds.   Discussed with cardiology team. Can continue DAPT on day of cardiac angiogram.

## 2023-02-01 NOTE — CONSULT NOTE ADULT - ASSESSMENT
Patient is an 87 year old former smoker, Peruvian speaking male, no known significant past medical history who presented with positional dizziness x 5 days. Neurovascular team consulted and recommended MRI brain, which was negative for acute stroke but reporting chronic right parietal infarcts, CTA reporting 80% stenosis of the left ICA origin and 70-80% stenosis fo the right ICA. Patient admitted for further w/u with possible carotid artery angioplasty / stenting. EF 38%, troponin neg x 2, cardiology consult appreciated pending f/u recommendations.     #dizziness, intermittent   Carotid stenosis   - plan for possible carotid angioplasty/stenting pending cardiology clearance   - NPO except meds tonight for possible procedure prep for tomorrow   - on ASA 81 QD and Plavix 75 QD   - on Lipitor 80 mg QD   -plan as per neurosurgery   neurology also following     Cardiomyopathy with EF 38%, new onset?  - on Metoprolol 25mg  BID   -on  Entresto 24/26 BID per cardiology recs  - pending cardiac cath / clearance for procedure , final plan as per cardiology   - NPO for possible cardiac cath today   - Transfer to telemetry unit  -follow cardiology recommendations       please call hospitalist team with any questions.          Patient is an 87 year old former smoker, [place] speaking male, no known significant past medical history who presented with positional dizziness x 5 days. Neurovascular team consulted and recommended MRI brain, which was negative for acute stroke but reporting chronic right parietal infarcts, CTA reporting 80% stenosis of the left ICA origin and 70-80% stenosis fo the right ICA. Patient admitted for further w/u with possible carotid artery angioplasty / stenting. EF 38%, troponin neg x 2, cardiology consult appreciated pending f/u recommendations.     #dizziness, intermittent   Carotid stenosis   - plan for possible carotid angioplasty/stenting pending cardiology clearance   - NPO except meds tonight for possible procedure prep for tomorrow   - on ASA 81 QD and Plavix 75 QD   - on Lipitor 80 mg QD   -plan as per neurosurgery   neurology also following     Cardiomyopathy with EF 38%, new onset?  - on Metoprolol 25mg  BID   -on  Entresto 24/26 BID per cardiology recs  - pending cardiac cath / clearance for procedure , final plan as per cardiology   - NPO for possible cardiac cath today   - Transfer to telemetry unit  -follow cardiology recommendations     #Multiple right upper lobe pulmonary nodules measuring up to 5 mm  outpatient PCP/pulm follow up  prior history of smoking  ex smoker quit 12 years ago  need repeat imaging (CT chest) OP      please call hospitalist team with any questions.          Patient is an 87 year old former smoker, Sudanese speaking male, no known significant past medical history who presented with positional dizziness x 5 days. Neurovascular team consulted and recommended MRI brain, which was negative for acute stroke but reporting chronic right parietal infarcts, CTA reporting 80% stenosis of the left ICA origin and 70-80% stenosis fo the right ICA. Patient admitted for further w/u with possible carotid artery angioplasty / stenting. EF 38%, troponin neg x 2, cardiology consult appreciated pending f/u recommendations.     #dizziness, intermittent   Carotid stenosis   - plan for possible carotid angioplasty/stenting pending cardiology clearance   - NPO except meds tonight for possible procedure prep for tomorrow   - on ASA 81 QD and Plavix 75 QD   - on Lipitor 80 mg QD   -plan as per neuroendovascular    neurology also following     Cardiomyopathy with EF 38%, new onset?  - on Metoprolol 25mg  BID   -on  Entresto 24/26 BID per cardiology recs  - pending cardiac cath / clearance for procedure , final plan as per cardiology   - NPO for possible cardiac cath today   - Transfer to telemetry unit  -follow cardiology recommendations     #Multiple right upper lobe pulmonary nodules measuring up to 5 mm  outpatient PCP/pulm follow up  prior history of smoking  ex smoker quit 12 years ago  need repeat imaging (CT chest) OP      please call hospitalist team with any questions.

## 2023-02-01 NOTE — CHART NOTE - NSCHARTNOTEFT_GEN_A_CORE
PREOPERATIVE DAY OF PROCEDURE EVALUATION:  I have personally seen and examined the patient.  I agree with the history and physical which I have reviewed and noted any changes below.  (Signed electronically by __________)  02-01-23 @ 15:28      Cath Bleeding Risk: 2.2%  Prehydration:  ml bolus x 1 hr prior to cardiac cath PREOPERATIVE DAY OF PROCEDURE EVALUATION:  I have personally seen and examined the patient.  I agree with the history and physical which I have reviewed and noted any changes below.  (Signed electronically by __________)  02-01-23 @ 15:28      Cath Bleeding Risk: 2.2%  Prehydration: NO Prehydration d/t new onset cardiomyopathy EF 38% PREOPERATIVE DAY OF PROCEDURE EVALUATION:  I have personally seen and examined the patient.  I agree with the history and physical which I have reviewed and noted any changes below.  (Signed electronically by Mayito Alston).      Cath Bleeding Risk: 2.2%  Prehydration:  ml bolus x 1 hr prior to cardiac cath PREOPERATIVE DAY OF PROCEDURE EVALUATION:  I have personally seen and examined the patient.  I agree with the history and physical which I have reviewed and noted any changes below.  (Signed electronically by Mayito Alston).      Cath Bleeding Risk: 2.2%  Prehydration:  ml bolus x 1 hr prior to cardiac cath    SEE CONSULT

## 2023-02-01 NOTE — CHART NOTE - NSCHARTNOTEFT_GEN_A_CORE
Per discussion between Dr. Alston and Dr. López, patient cleared for endovascular carotid stenting procedure tomorrow AM.   Awaiting formal documentation from cardiac team regarding clearance.     Keep NPO except meds for endovascular procedure tomorrow.     Management per neurosurgery / medicine team.

## 2023-02-01 NOTE — PROGRESS NOTE ADULT - SUBJECTIVE AND OBJECTIVE BOX
Neuroendovascular Progress Note:     HPI:  The patient is an 87-year-old, Beninese-speaking male with no significant past medical history (but reports to have not seen a doctor in over 2 years) who presented with positional dizziness (when getting up from seated position) x 5 days. 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 The patient was examined at bedside and has no acute complaints. The patient denies current dizziness, headache, nausea, numbness/tingling, vision changes, and unusual fatigue.   [id number] used for this encounter.   (31 Jan 2023 13:24)    Interval HPI: No overnight events. Patient's son at bedside declined Beninese . Patient without complaints this AM. Continues on  qd and Plavix 75 qd for now. Pending PRU. Pending continued cardiac clearance - possibly for coronary angiogram today per cardiology.     Past medical history:   Kidney stones  Urinary tract infection, E. coli    Medications:   aspirin enteric coated 325 milliGRAM(s) Oral daily  atorvastatin 80 milliGRAM(s) Oral at bedtime  clopidogrel Tablet 75 milliGRAM(s) Oral every 24 hours  pantoprazole    Tablet 40 milliGRAM(s) Oral before breakfast    Vitals:   T(F): 97 (02-01-23 @ 05:07), Max: 97.2 (01-31-23 @ 16:21)  HR: 66 (02-01-23 @ 05:07) (66 - 79)  BP: 133/76 (02-01-23 @ 05:07) (131/67 - 161/92)  RR: 20 (02-01-23 @ 05:07) (17 - 20)  SpO2: 96% (01-31-23 @ 19:30) (96% - 97%)    Labs:                         15.9   9.80  )-----------( 235      ( 01 Feb 2023 08:24 )             47.7   02-01  140  |  105  |  17  ----------------------------<  118<H>  4.3   |  22  |  1.1  Ca    8.9      01 Feb 2023 08:24  Phos  3.6     02-01  Mg     2.1     02-01  TPro  6.8  /  Alb  4.0  /  TBili  1.1  /  DBili  x   /  AST  14  /  ALT  8   /  AlkPhos  53  02-01  PT/INR - ( 30 Jan 2023 13:21 )   PT: TNP sec;   INR: TNP ratio    PTT - ( 30 Jan 2023 13:21 )  PTT:TNP sec  LIVER FUNCTIONS - ( 01 Feb 2023 08:24 )  Alb: 4.0 g/dL / Pro: 6.8 g/dL / ALK PHOS: 53 U/L / ALT: 8 U/L / AST: 14 U/L / GGT: x           Exam:   General - NAD   Neuro - AAO3, follows commands, EOMI, visual fields full, face symmetric, moves all extremities, mild LUE dysmetria on finger to nose, sensation intact, no dysarthria or aphasia. NIHSS 1    Radiology:   Imaging reviewed per neuroendovascular ACP/attending.     < from: CT Angio Head w/ IV Cont (01.30.23 @ 15:26) >  IMPRESSION:  1.  Atheromatous plaque at the proximal right ICA producing 70-80%   stenosis.  2.  Atheromatous plaque of the left ICA origin with about 80% stenosis.  3.  Calcific plaque of the distal ICAs with moderate stenosis of the   right cavernous segment and moderate/severe stenoses of bilateral   supraclinoid segments.  4.  Multiple right upper lobe pulmonary nodules measuring up to 5 mm.   Dedicated chest CT may be obtained on an outpatient basis.  --- End of Report ---  < end of copied text >  < from: CT Head No Cont (01.30.23 @ 15:22) >  IMPRESSION:  1.  No evidence of acute intracranial hemorrhage.  2.  Right frontal and parietal lobe infarcts, chronic in appearance but   new since the prior head CT 7/8/2018. Correlate with medical history.  < end of copied text >      < from: MR Head No Cont (01.30.23 @ 22:18) >  IMPRESSION:  1.  No acute infarct or intracranial hemorrhage.  2.  Mild chronic microvascular changes and chronic infarcts in the right   parietal lobe.  3.  Left vertebral artery signal abnormality consistent with severe V4   segment stenoses as correlated with CTA.  < end of copied text >    Assessment:   Patient is an 87 year old former smoker, Beninese speaking male, no known significant past medical history who presented with positional dizziness x 5 days. Neurovascular team consulted and recommended MRI brain, which was negative for acute stroke but reporting chronic right parietal infarcts, CTA reporting 80% stenosis of the left ICA origin and 70-80% stenosis fo the right ICA. Patient admitted for further w/u with possible carotid artery angioplasty / stenting. EF 38%, troponin neg x 2, cardiology consult appreciated pending f/u recommendations.     Carotid stenosis   - plan for possible carotid angioplasty/stenting pending cardiology clearance   - NPO except meds tonight for possible procedure prep for tomorrow   - Continue ASA 81 QD and Plavix 75 QD - pending PRU   - Continue Lipitor 80 mg QD     Cardiomyopathy with EF 38%   - Metoprolol 25mg  BID per cardiology recs  - Entresto 24/26 BID per cardiology recs  - pending cardiac cath / clearance for procedure   - NPO for possible cardiac cath today   - Transfer to telemetry unit    Discussed with Dr. López.   Pending medicine assessment for co-management.               [id number] neuroendovascular

## 2023-02-01 NOTE — CHART NOTE - NSCHARTNOTEFT_GEN_A_CORE
Neurosurgery Event Note    Pt resting comfortably. Nurse Marimar at bedside for neuro assessment at 2330  Pt easily awakens to voice. Pt NPO. Echo complete EF38% Cards consult incomplete awaiting Cards Attndg Signature    1A: Level of consciousness       0= Alert; keenly responsive  1C: "Blink eyes" and "Squeeze Hands"       0= Performs both  2: Horizontal EOMs       0= Normal  3: Visual fields       0= No visual loss     4: Facial palsy (use grimace if obtunded)       0= Normal symmetry  5A: Left arm motor drift (count out loud and use fingers to show count       +1= Drift but doesn't hit bed  5B: Right arm motor drift       0= No drift x 10 seconds  6A: Left leg motor drift       0= No drift x 10 seconds  6B: Right leg motor drift       0= No drift x 10 seconds  7: Limb ataxia (FNF/heel-shin)       +1= Ataxia in 1 limb  8: Sensation       0= Normal, no sensory loss  9: Language/aphasia-       0= Normal, no aphasia  10: Dysarthria- read the words       0= Normal  11: Extinction/inattention       0= No abnormality       NIH2

## 2023-02-01 NOTE — CHART NOTE - NSCHARTNOTEFT_GEN_A_CORE
PRE-OP DIAGNOSIS:    EF of 38%  Wall motion abnormalities: Basal inferior, mid inferior, basal inferolateral, mid inferolateral     PROCEDURE:   [x] Coronary Angiogram    PHYSICIAN: Dr. Alston   CARDIOLOGY FELLOW: Dr. Alicea    PROCEDURE DESCRIPTION:     Consent:  [x] Patient     Anesthesia:    [x] Sedation   [x] Local     Access & Closure:   [] 5Fr right Femoral Artery -> Perclose     IV Contrast:   35     mL      Intervention: None    Implants: None     FINDINGS:     Coronary Dominance: Right    LM: Minimal luminal irregularities.   LAD: Proximal: Minor irregularities. Mid: Severe diffuse disease. Distal: Focal 80% lesion.   Diag1: Severe ostial lesion. 70% lesion of proximal vessel. Mild diffuse disease.    LCx: Proximal: Minor irregularities. Distal: Moderate diffuse disease.   OM: Moderate diffuse disease.   RCA: Proximal RCA with mild disease. Mid with moderate disease and an 80% lesion. Distal: 95% lesion. DANIS 3 flow.   rPDA: moderate disease   RPL: Mild disease.     ESTIMATED BLOOD LOSS: < 10 mL      CONDITION:   [x] Good     SPECIMEN REMOVED: N/A     POST-OP DIAGNOSIS:     [] Significant 2-Vessel Coronary Artery Disease of the RCA and LAD    PLAN OF CARE:   [] Return to In-patient bed    [] Medications: Continue aspirin 81mg, plavix 75mg, high intensity statin  NS 100cc per hour for 4 hours PRE-OP DIAGNOSIS:    EF of 38%  Wall motion abnormalities: Basal inferior, mid inferior, basal inferolateral, mid inferolateral     PROCEDURE:   [x] Coronary Angiogram    PHYSICIAN: Dr. Alston   CARDIOLOGY FELLOW: Dr. Alicea    PROCEDURE DESCRIPTION:     Consent:  [x] Patient     Anesthesia:    [x] Sedation   [x] Local     Access & Closure:   [] 5Fr right Femoral Artery -> Perclose     IV Contrast:   35     mL      Intervention: None    Implants: None     FINDINGS:     Coronary Dominance: Right    LM: Minimal luminal irregularities.   LAD: Proximal: Minor irregularities. Mid: Severe diffuse disease. Distal: Focal 80% lesion.   Diag1: Severe ostial lesion. 70% lesion of proximal vessel. Mild diffuse disease.    LCx: Proximal: Minor irregularities. Distal: Moderate diffuse disease.   OM: Moderate diffuse disease.   RCA: Proximal RCA with mild disease. Mid with moderate disease and an 80% lesion. Distal: 95% lesion. DANIS 3 flow.   rPDA: moderate disease   RPL: Mild disease.     ESTIMATED BLOOD LOSS: < 10 mL      CONDITION:   [x] Good     SPECIMEN REMOVED: N/A     POST-OP DIAGNOSIS:     [] Significant 2-Vessel Coronary Artery Disease of the RCA and LAD    PLAN OF CARE:   [] Return to In-patient bed    [] Medications: Continue aspirin 81mg, plavix 75mg, high intensity statin  NS 100cc per hour for 4 hours    SEE CATH REPORT FOR ATTENDING IMPRESSION

## 2023-02-01 NOTE — CHART NOTE - NSCHARTNOTEFT_GEN_A_CORE
Patient still pending cardiac catheterization / clearance for possible carotid stenting tonight.   Will continue to await updates from cardiac team regarding today's planned angiogram and clearance.     Keep NPO except meds after midnight tonight in preparation for possible endovascular cerebral angiogram / stenting for tomorrow pending clearance.     CBC/CMP/Coag panel tomorrow AM.

## 2023-02-02 LAB
ALBUMIN SERPL ELPH-MCNC: 4.2 G/DL — SIGNIFICANT CHANGE UP (ref 3.5–5.2)
ALP SERPL-CCNC: 53 U/L — SIGNIFICANT CHANGE UP (ref 30–115)
ALT FLD-CCNC: 8 U/L — SIGNIFICANT CHANGE UP (ref 0–41)
ANION GAP SERPL CALC-SCNC: 16 MMOL/L — HIGH (ref 7–14)
AST SERPL-CCNC: 14 U/L — SIGNIFICANT CHANGE UP (ref 0–41)
BILIRUB SERPL-MCNC: 0.9 MG/DL — SIGNIFICANT CHANGE UP (ref 0.2–1.2)
BUN SERPL-MCNC: 22 MG/DL — HIGH (ref 10–20)
CALCIUM SERPL-MCNC: 8.7 MG/DL — SIGNIFICANT CHANGE UP (ref 8.4–10.5)
CHLORIDE SERPL-SCNC: 105 MMOL/L — SIGNIFICANT CHANGE UP (ref 98–110)
CO2 SERPL-SCNC: 18 MMOL/L — SIGNIFICANT CHANGE UP (ref 17–32)
CREAT SERPL-MCNC: 1.1 MG/DL — SIGNIFICANT CHANGE UP (ref 0.7–1.5)
EGFR: 66 ML/MIN/1.73M2 — SIGNIFICANT CHANGE UP
GLUCOSE SERPL-MCNC: 141 MG/DL — HIGH (ref 70–99)
HCT VFR BLD CALC: 48 % — SIGNIFICANT CHANGE UP (ref 42–52)
HGB BLD-MCNC: 16.2 G/DL — SIGNIFICANT CHANGE UP (ref 14–18)
INR BLD: 1.08 RATIO — SIGNIFICANT CHANGE UP (ref 0.65–1.3)
MCHC RBC-ENTMCNC: 30.7 PG — SIGNIFICANT CHANGE UP (ref 27–31)
MCHC RBC-ENTMCNC: 33.8 G/DL — SIGNIFICANT CHANGE UP (ref 32–37)
MCV RBC AUTO: 90.9 FL — SIGNIFICANT CHANGE UP (ref 80–94)
NRBC # BLD: 0 /100 WBCS — SIGNIFICANT CHANGE UP (ref 0–0)
PLATELET # BLD AUTO: 216 K/UL — SIGNIFICANT CHANGE UP (ref 130–400)
POTASSIUM SERPL-MCNC: 3.9 MMOL/L — SIGNIFICANT CHANGE UP (ref 3.5–5)
POTASSIUM SERPL-SCNC: 3.9 MMOL/L — SIGNIFICANT CHANGE UP (ref 3.5–5)
PROT SERPL-MCNC: 6.9 G/DL — SIGNIFICANT CHANGE UP (ref 6–8)
PROTHROM AB SERPL-ACNC: 12.3 SEC — SIGNIFICANT CHANGE UP (ref 9.95–12.87)
RBC # BLD: 5.28 M/UL — SIGNIFICANT CHANGE UP (ref 4.7–6.1)
RBC # FLD: 14.2 % — SIGNIFICANT CHANGE UP (ref 11.5–14.5)
SODIUM SERPL-SCNC: 139 MMOL/L — SIGNIFICANT CHANGE UP (ref 135–146)
WBC # BLD: 10.08 K/UL — SIGNIFICANT CHANGE UP (ref 4.8–10.8)
WBC # FLD AUTO: 10.08 K/UL — SIGNIFICANT CHANGE UP (ref 4.8–10.8)

## 2023-02-02 PROCEDURE — 99232 SBSQ HOSP IP/OBS MODERATE 35: CPT

## 2023-02-02 RX ORDER — METOPROLOL TARTRATE 50 MG
25 TABLET ORAL
Refills: 0 | Status: DISCONTINUED | OUTPATIENT
Start: 2023-02-02 | End: 2023-02-07

## 2023-02-02 RX ORDER — SODIUM CHLORIDE 9 MG/ML
250 INJECTION INTRAMUSCULAR; INTRAVENOUS; SUBCUTANEOUS ONCE
Refills: 0 | Status: DISCONTINUED | OUTPATIENT
Start: 2023-02-02 | End: 2023-02-03

## 2023-02-02 RX ORDER — SACUBITRIL AND VALSARTAN 24; 26 MG/1; MG/1
1 TABLET, FILM COATED ORAL
Refills: 0 | Status: DISCONTINUED | OUTPATIENT
Start: 2023-02-02 | End: 2023-02-07

## 2023-02-02 RX ADMIN — Medication 25 MILLIGRAM(S): at 17:33

## 2023-02-02 RX ADMIN — ATORVASTATIN CALCIUM 80 MILLIGRAM(S): 80 TABLET, FILM COATED ORAL at 21:33

## 2023-02-02 RX ADMIN — SACUBITRIL AND VALSARTAN 1 TABLET(S): 24; 26 TABLET, FILM COATED ORAL at 17:33

## 2023-02-02 RX ADMIN — PANTOPRAZOLE SODIUM 40 MILLIGRAM(S): 20 TABLET, DELAYED RELEASE ORAL at 06:25

## 2023-02-02 RX ADMIN — Medication 25 MILLIGRAM(S): at 06:25

## 2023-02-02 RX ADMIN — SACUBITRIL AND VALSARTAN 1 TABLET(S): 24; 26 TABLET, FILM COATED ORAL at 06:25

## 2023-02-02 RX ADMIN — CLOPIDOGREL BISULFATE 75 MILLIGRAM(S): 75 TABLET, FILM COATED ORAL at 17:33

## 2023-02-02 RX ADMIN — Medication 325 MILLIGRAM(S): at 12:45

## 2023-02-02 NOTE — PRE PROCEDURE NOTE - PRE PROCEDURE EVALUATION
Interventional Neuro Radiology  Pre-Procedure Note PACYNDIE    HPI:  The patient is an 87-year-old, Cymro-speaking male with no significant past medical history (but reports to have not seen a doctor in over 2 years) who presented with positional dizziness (when getting up from seated position) x 5 days. 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 The patient is now s/p cardiac catheterization on which he was found to have an EF of 38% with inferior wall motion abnormalities. The patient is scheduled today for a diagnostic cerebral angiogram + planned carotid angioplasty and stenting. Per conversation between Dr. Alston and Dr. López the patient is cleared for the procedure.  NPO after midnight  NIHSS 0    Allergies: No Known Allergies    PAST MEDICAL & SURGICAL HISTORY:  Kidney stones  Urinary tract infection, E. coli  H/O lithotripsy    FAMILY HISTORY:  No pertinent family history in first degree relatives    Current Medications: acetaminophen     Tablet .. 650 milliGRAM(s) Oral every 6 hours PRN  aspirin enteric coated 325 milliGRAM(s) Oral daily  atorvastatin 80 milliGRAM(s) Oral at bedtime  clopidogrel Tablet 75 milliGRAM(s) Oral every 24 hours  metoprolol tartrate 25 milliGRAM(s) Oral two times a day  ondansetron Injectable 4 milliGRAM(s) IV Push every 6 hours PRN  pantoprazole    Tablet 40 milliGRAM(s) Oral before breakfast  sacubitril 24 mG/valsartan 26 mG 1 Tablet(s) Oral two times a day    Labs:                         16.0   10.36 )-----------( 234      ( 01 Feb 2023 11:35 )             47.6       02-01    140  |  104  |  18  ----------------------------<  145<H>  4.0   |  24  |  1.1    Ca    9.0      01 Feb 2023 11:35  Phos  3.6     02-01  Mg     2.1     02-01    TPro  6.8  /  Alb  4.0  /  TBili  1.1  /  DBili  x   /  AST  14  /  ALT  8   /  AlkPhos  53  02-01    Blood Bank: 02-01-23  --  A POS  --  Assessment/Plan:   This is a 87 year old right hand dominant male  presents with   Procedure, goals, risks, benefits and alternatives  were discussed with patient and patient's family.  All questions were answered to best understanding.   Risks discussed include but are not limited to stroke, vessel injury, hemorrhage, and or right  groin hematoma.  Patient demonstrates understanding  of all risks involved with this procedure and wishes to continue.     Appropriate  content was obtained from patient and consent is in the patient's chart.

## 2023-02-02 NOTE — PROGRESS NOTE ADULT - SUBJECTIVE AND OBJECTIVE BOX
GEENABHAVNA BILLINGSIY  [facility] T[facility]      Patient is a 87y old  Male who presents with a chief complaint of dizziness (01 Feb 2023 14:52)        Interval events:  Patient seen and examined at bedside. Had cath yesterday. Procedure with IR cancelled today. Still has dizziness with moving.     -PMHx: Kidney stones    Urinary tract infection, E. coli      -PSHx:H/O lithotripsy            REVIEW OF SYSTEMS:  CONSTITUTIONAL: No fever, weight loss, or fatigue  RESPIRATORY: No cough, wheezing, chills or hemoptysis; No shortness of breath  CARDIOVASCULAR: No chest pain, palpitations, dizziness, or leg swelling  GASTROINTESTINAL: No abdominal or epigastric pain. No nausea, vomiting, or hematemesis; No diarrhea or constipation. No melena or hematochezia.  NEUROLOGICAL: as above   LYMPH NODES: No enlarged glands  MUSCULOSKELETAL: No joint pain or swelling; No muscle, back, or extremity pain      T(C): , Max: 37 (02-02-23 @ 04:07)  HR: 71 (02-02-23 @ 14:00)  BP: 117/60 (02-02-23 @ 14:00)  RR: 18 (02-02-23 @ 14:00)  SpO2: 94% (02-02-23 @ 10:09)  CAPILLARY BLOOD GLUCOSE          PHYSICAL EXAM:  GENERAL: Patient lying on bed, comfortable   CHEST/LUNG: NVB, no wheezing   HEART: R1+R2, RRR  ABDOMEN: Soft. non tender, BS positive  EXTREMITIES:  no edema, Bruising  CNS: AAAx4. No cranial nerves deficit.       LABS:          16.2  10.08  )-------(216          48.0  N=--  L=--  MCV=90.9    139|105|22<H>  ------------------<141<H>  3.9|18|1.1  eGFR:--  Ca:8.7      PT/INR - ( 02 Feb 2023 07:41 )   PT: 12.30 sec;   INR: 1.08 ratio         PTT - ( 01 Feb 2023 11:35 )  PTT:28.0 sec      Microbiology:      RADIOLOGY & ADDITIONAL TESTS:  FINDINGS:     Coronary Dominance: Right    LM: Minimal luminal irregularities.   LAD: Proximal: Minor irregularities. Mid: Severe diffuse disease. Distal: Focal 80% lesion.   Diag1: Severe ostial lesion. 70% lesion of proximal vessel. Mild diffuse disease.    LCx: Proximal: Minor irregularities. Distal: Moderate diffuse disease.   OM: Moderate diffuse disease.   RCA: Proximal RCA with mild disease. Mid with moderate disease and an 80% lesion. Distal: 95% lesion. DANIS 3 flow.   rPDA: moderate disease   RPL: Mild disease.       Medications:  acetaminophen     Tablet .. 650 milliGRAM(s) Oral every 6 hours PRN  aspirin enteric coated 325 milliGRAM(s) Oral daily  atorvastatin 80 milliGRAM(s) Oral at bedtime  clopidogrel Tablet 75 milliGRAM(s) Oral every 24 hours  metoprolol tartrate 25 milliGRAM(s) Oral two times a day  ondansetron Injectable 4 milliGRAM(s) IV Push every 6 hours PRN  pantoprazole    Tablet 40 milliGRAM(s) Oral before breakfast  sacubitril 24 mG/valsartan 26 mG 1 Tablet(s) Oral two times a day  sodium chloride 0.9% Bolus 250 milliLiter(s) IV Bolus once

## 2023-02-02 NOTE — CHART NOTE - NSCHARTNOTEFT_GEN_A_CORE
The patient was initially scheduled for a diagnostic cerebral angiogram + angioplasty/ DYANA with Dr. López however, on arrival to IR, the patient was hypotensive with systolic blood pressure in the high 80's and reported to have had bouts of diarrhea last night. A 250 mL fluid bolus was administered and the patient is tentatively scheduled for the procedure tomorrow morning pending repeat AM labs/ vitals and IR availability.  [phone] with questions/ concerns

## 2023-02-02 NOTE — PROGRESS NOTE ADULT - ASSESSMENT
87 year old former smoker, Armenian speaking male, no known significant past medical history who presented with positional dizziness x 5 days. Neurovascular team consulted and recommended MRI brain, which was negative for acute stroke but reporting chronic right parietal infarcts, CTA reporting 80% stenosis of the left ICA origin and 70-80% stenosis fo the right ICA. Patient admitted for further w/u with possible carotid artery angioplasty / stenting. 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     #dizziness, intermittent   #Carotid stenosis   - plan for possible carotid angioplasty/stenting; cancelled today; f/u with neuroendovascular/IR   - on ASA 81 QD and Plavix 75 QD   - on Lipitor 80 mg QD   -plan as per neuroendovascular    -neurology also following      #Cardiomyopathy with EF 38%, new onset?  #Significant 2-Vessel Coronary Artery Disease of the RCA and LAD  - on Metoprolol 25mg  BID   -on  Entresto 24/26 BID per cardiology recs  - high-intensity statin, asa, plavix   -c/w tele   -follow cardiology recommendations     #Multiple right upper lobe pulmonary nodules measuring up to 5 mm  -outpatient PCP/pulm follow up  -prior history of smoking  -ex smoker quit 12 years ago  -need repeat imaging (CT chest) OP      please call hospitalist team with any questions.

## 2023-02-02 NOTE — CHART NOTE - NSCHARTNOTEFT_GEN_A_CORE
NSICU asked by neuroendovascular team to evaluate patient for hypotension. Patient was scheduled for DSA (+) angioplasty/DYANA for carotid stenosis. Prior to procedure, patient SBP 80s, received 250cc NS bolus with resolution of hypotension.     On examination, patient is awake, alert, oriented x3, English-speaking, PERRLA, EOMI, face symmetric, tongue midline, able to lift AG B/L UE and LE AG, no drift, strength 5/5.     All imaging and vitals reviewed. Patient hemodynamically stable. Would recommend gentle hydration, given EF 38%. Would hold Lopressor and Entresto if SBP < 110. No indication for NCC at this time.     Case discussed with NCC Attending Dr. Schwab  [phone]

## 2023-02-03 LAB
ALBUMIN SERPL ELPH-MCNC: 4 G/DL — SIGNIFICANT CHANGE UP (ref 3.5–5.2)
ALP SERPL-CCNC: 53 U/L — SIGNIFICANT CHANGE UP (ref 30–115)
ALT FLD-CCNC: 8 U/L — SIGNIFICANT CHANGE UP (ref 0–41)
ANION GAP SERPL CALC-SCNC: 11 MMOL/L — SIGNIFICANT CHANGE UP (ref 7–14)
AST SERPL-CCNC: 12 U/L — SIGNIFICANT CHANGE UP (ref 0–41)
BILIRUB SERPL-MCNC: 0.6 MG/DL — SIGNIFICANT CHANGE UP (ref 0.2–1.2)
BUN SERPL-MCNC: 26 MG/DL — HIGH (ref 10–20)
C DIFF BY PCR RESULT: SIGNIFICANT CHANGE UP
CALCIUM SERPL-MCNC: 8.3 MG/DL — LOW (ref 8.4–10.5)
CHLORIDE SERPL-SCNC: 108 MMOL/L — SIGNIFICANT CHANGE UP (ref 98–110)
CO2 SERPL-SCNC: 22 MMOL/L — SIGNIFICANT CHANGE UP (ref 17–32)
CREAT SERPL-MCNC: 1.3 MG/DL — SIGNIFICANT CHANGE UP (ref 0.7–1.5)
EGFR: 54 ML/MIN/1.73M2 — LOW
GLUCOSE SERPL-MCNC: 114 MG/DL — HIGH (ref 70–99)
HCT VFR BLD CALC: 47.5 % — SIGNIFICANT CHANGE UP (ref 42–52)
HGB BLD-MCNC: 15.7 G/DL — SIGNIFICANT CHANGE UP (ref 14–18)
INR BLD: 1.05 RATIO — SIGNIFICANT CHANGE UP (ref 0.65–1.3)
MCHC RBC-ENTMCNC: 31.4 PG — HIGH (ref 27–31)
MCHC RBC-ENTMCNC: 33.1 G/DL — SIGNIFICANT CHANGE UP (ref 32–37)
MCV RBC AUTO: 95 FL — HIGH (ref 80–94)
NRBC # BLD: 0 /100 WBCS — SIGNIFICANT CHANGE UP (ref 0–0)
PLATELET # BLD AUTO: 234 K/UL — SIGNIFICANT CHANGE UP (ref 130–400)
POTASSIUM SERPL-MCNC: 3.8 MMOL/L — SIGNIFICANT CHANGE UP (ref 3.5–5)
POTASSIUM SERPL-SCNC: 3.8 MMOL/L — SIGNIFICANT CHANGE UP (ref 3.5–5)
PROT SERPL-MCNC: 6.4 G/DL — SIGNIFICANT CHANGE UP (ref 6–8)
PROTHROM AB SERPL-ACNC: 12 SEC — SIGNIFICANT CHANGE UP (ref 9.95–12.87)
RBC # BLD: 5 M/UL — SIGNIFICANT CHANGE UP (ref 4.7–6.1)
RBC # FLD: 14.2 % — SIGNIFICANT CHANGE UP (ref 11.5–14.5)
SODIUM SERPL-SCNC: 141 MMOL/L — SIGNIFICANT CHANGE UP (ref 135–146)
WBC # BLD: 9.02 K/UL — SIGNIFICANT CHANGE UP (ref 4.8–10.8)
WBC # FLD AUTO: 9.02 K/UL — SIGNIFICANT CHANGE UP (ref 4.8–10.8)

## 2023-02-03 PROCEDURE — 99232 SBSQ HOSP IP/OBS MODERATE 35: CPT

## 2023-02-03 RX ORDER — HEPARIN SODIUM 5000 [USP'U]/ML
5000 INJECTION INTRAVENOUS; SUBCUTANEOUS EVERY 8 HOURS
Refills: 0 | Status: DISCONTINUED | OUTPATIENT
Start: 2023-02-03 | End: 2023-02-06

## 2023-02-03 RX ORDER — SODIUM CHLORIDE 9 MG/ML
500 INJECTION, SOLUTION INTRAVENOUS
Refills: 0 | Status: DISCONTINUED | OUTPATIENT
Start: 2023-02-03 | End: 2023-02-08

## 2023-02-03 RX ORDER — SODIUM CHLORIDE 9 MG/ML
1000 INJECTION, SOLUTION INTRAVENOUS
Refills: 0 | Status: DISCONTINUED | OUTPATIENT
Start: 2023-02-03 | End: 2023-02-03

## 2023-02-03 RX ADMIN — SODIUM CHLORIDE 50 MILLILITER(S): 9 INJECTION, SOLUTION INTRAVENOUS at 03:00

## 2023-02-03 RX ADMIN — ATORVASTATIN CALCIUM 80 MILLIGRAM(S): 80 TABLET, FILM COATED ORAL at 22:21

## 2023-02-03 RX ADMIN — Medication 25 MILLIGRAM(S): at 17:46

## 2023-02-03 RX ADMIN — HEPARIN SODIUM 5000 UNIT(S): 5000 INJECTION INTRAVENOUS; SUBCUTANEOUS at 22:21

## 2023-02-03 RX ADMIN — CLOPIDOGREL BISULFATE 75 MILLIGRAM(S): 75 TABLET, FILM COATED ORAL at 17:46

## 2023-02-03 RX ADMIN — SACUBITRIL AND VALSARTAN 1 TABLET(S): 24; 26 TABLET, FILM COATED ORAL at 17:46

## 2023-02-03 RX ADMIN — Medication 325 MILLIGRAM(S): at 11:51

## 2023-02-03 RX ADMIN — HEPARIN SODIUM 5000 UNIT(S): 5000 INJECTION INTRAVENOUS; SUBCUTANEOUS at 14:48

## 2023-02-03 RX ADMIN — PANTOPRAZOLE SODIUM 40 MILLIGRAM(S): 20 TABLET, DELAYED RELEASE ORAL at 05:42

## 2023-02-03 NOTE — PROGRESS NOTE ADULT - SUBJECTIVE AND OBJECTIVE BOX
LOIS KANG  [facility] T6-3C 014 B ([facility] T6-3C)      Patient is a 87y old  Male who presents with a chief complaint of dizziness (01 Feb 2023 14:52)        Interval events:  Patient seen and examined at bedside.   no acute issue overnight  as per nursing staff patient having loose stool 4 times since morning  patient was given LR for 6 hours in AM due ot borderline BP.   most recent BP much better    -PMHx: Kidney stones    Urinary tract infection, E. coli      -PSHx:H/O lithotripsy            REVIEW OF SYSTEMS:  CONSTITUTIONAL: No fever, weight loss, or fatigue  RESPIRATORY: No cough, wheezing, chills or hemoptysis; No shortness of breath  CARDIOVASCULAR: No chest pain, palpitations, dizziness, or leg swelling  GASTROINTESTINAL: No abdominal or epigastric pain. No nausea, vomiting, or hematemesis; No diarrhea or constipation. No melena or hematochezia.  NEUROLOGICAL: as above   LYMPH NODES: No enlarged glands  MUSCULOSKELETAL: No joint pain or swelling; No muscle, back, or extremity pain      Vital Signs Last 24 Hrs  T(C): 35.6 (03 Feb 2023 12:32), Max: 36.8 (03 Feb 2023 05:04)  T(F): 96.1 (03 Feb 2023 12:32), Max: 98.3 (03 Feb 2023 05:04)  HR: 87 (03 Feb 2023 12:32) (69 - 87)  BP: 132/89 (03 Feb 2023 12:32) (98/71 - 135/73)  BP(mean): --  RR: 18 (03 Feb 2023 12:32) (16 - 18)  SpO2: --            PHYSICAL EXAM:  GENERAL: Patient lying on bed, comfortable   CHEST/LUNG: NVB, no wheezing   HEART: R1+R2, RRR  ABDOMEN: Soft. non tender, BS positive  EXTREMITIES:  no edema, Bruising  CNS: AAAx4. No cranial nerves deficit.       LABS:      LABS:                        15.7   9.02  )-----------( 234      ( 03 Feb 2023 05:30 )             47.5     02-03    141  |  108  |  26<H>  ----------------------------<  114<H>  3.8   |  22  |  1.3    Ca    8.3<L>      03 Feb 2023 05:30    TPro  6.4  /  Alb  4.0  /  TBili  0.6  /  DBili  x   /  AST  12  /  ALT  8   /  AlkPhos  53  02-03    PT/INR - ( 03 Feb 2023 05:30 )   PT: 12.00 sec;   INR: 1.05 ratio                   Microbiology:      RADIOLOGY & ADDITIONAL TESTS:  FINDINGS:     Coronary Dominance: Right    LM: Minimal luminal irregularities.   LAD: Proximal: Minor irregularities. Mid: Severe diffuse disease. Distal: Focal 80% lesion.   Diag1: Severe ostial lesion. 70% lesion of proximal vessel. Mild diffuse disease.    LCx: Proximal: Minor irregularities. Distal: Moderate diffuse disease.   OM: Moderate diffuse disease.   RCA: Proximal RCA with mild disease. Mid with moderate disease and an 80% lesion. Distal: 95% lesion. DANIS 3 flow.   rPDA: moderate disease   RPL: Mild disease.       Medications:  acetaminophen     Tablet .. 650 milliGRAM(s) Oral every 6 hours PRN  aspirin enteric coated 325 milliGRAM(s) Oral daily  atorvastatin 80 milliGRAM(s) Oral at bedtime  clopidogrel Tablet 75 milliGRAM(s) Oral every 24 hours  metoprolol tartrate 25 milliGRAM(s) Oral two times a day  ondansetron Injectable 4 milliGRAM(s) IV Push every 6 hours PRN  pantoprazole    Tablet 40 milliGRAM(s) Oral before breakfast  sacubitril 24 mG/valsartan 26 mG 1 Tablet(s) Oral two times a day  sodium chloride 0.9% Bolus 250 milliLiter(s) IV Bolus once

## 2023-02-03 NOTE — CHART NOTE - NSCHARTNOTEFT_GEN_A_CORE
Spoke to patient with his son over the phone (Edward [phone]) regarding plan for right DYANA this coming Tuesday. All questions answered to patient and son satisfaction by NI attending and ACP.     F/u medicine recs for diarrhea management, patient is pending CDiff r/o, fecal calprotectin, stool lactoferrin, GI PCR panel. Orders placed.     Neurosurgery aware.

## 2023-02-03 NOTE — PROGRESS NOTE ADULT - SUBJECTIVE AND OBJECTIVE BOX
Neuroendovascular Progress Note:     Citizen of [place]  #[id number]     HPI:  Patient is an 87 year old former smoker, Kyrgyz speaking male, without significant known past medical history who presented with positional dizziness x 5 days. MRI reporting no acute infarct or hemorrhage, but chronic right parietal infarcts and CTA reporting left V4 stenosis, left ICA 80% stenosis, right ICA 70-80 % stenosis and moderate bilateral supraclinoid stenosis. Patient was admitted for evaluation / plan for right carotid stenting / angioplasty given chronic right sided infarcts and severe stenosis. Patient s/p cardiac cath POD 2, cleared for carotid DYANA procedure per cardiology. Patient this AM c/o diarrhea. Denies headache, weakness, lightheadedness, nausea.     Past medical history:   Kidney stones  Urinary tract infection, E. coli    Medications:   aspirin enteric coated 325 milliGRAM(s) Oral daily  atorvastatin 80 milliGRAM(s) Oral at bedtime  clopidogrel Tablet 75 milliGRAM(s) Oral every 24 hours  lactated ringers. 500 milliLiter(s) IV Continuous <Continuous>  metoprolol tartrate 25 milliGRAM(s) Oral two times a day  pantoprazole    Tablet 40 milliGRAM(s) Oral before breakfast  sacubitril 24 mG/valsartan 26 mG 1 Tablet(s) Oral two times a day    Vitals:   T(F): 98.3 (02-03-23 @ 05:04), Max: 98.3 (02-03-23 @ 05:04)  HR: 81 (02-03-23 @ 11:32) (69 - 81)  BP: 135/73 (02-03-23 @ 11:32) (98/71 - 135/73)  RR: 18 (02-03-23 @ 05:04) (16 - 18)  SpO2: --    Labs:                         15.7   9.02  )-----------( 234      ( 03 Feb 2023 05:30 )             47.5   02-03  141  |  108  |  26<H>  ----------------------------<  114<H>  3.8   |  22  |  1.3  Ca    8.3<L>      03 Feb 2023 05:30  TPro  6.4  /  Alb  4.0  /  TBili  0.6  /  DBili  x   /  AST  12  /  ALT  8   /  AlkPhos  53  02-03  PT/INR - ( 03 Feb 2023 05:30 )   PT: 12.00 sec;   INR: 1.05 ratio    LIVER FUNCTIONS - ( 03 Feb 2023 05:30 )  Alb: 4.0 g/dL / Pro: 6.4 g/dL / ALK PHOS: 53 U/L / ALT: 8 U/L / AST: 12 U/L / GGT: x           Exam:   General - NAD   Neuro - AAO3, follows commands, EOMI, visual fields full, face symmetric, moving all extremities to antigravity, sensation intact, no dysarthria or aphasia,     Radiology:   Imaging reviewed per neuroendovascular ACP/attending.   < from: CT Angio Neck w/ IV Cont (01.30.23 @ 15:24) >  IMPRESSION:    1.  Atheromatous plaque at the proximal right ICA producing 70-80%   stenosis.    2.  Atheromatous plaque of the left ICA origin with about 80% stenosis.    3.  Calcific plaque of the distal ICAs with moderate stenosis of the   right cavernous segment and moderate/severe stenoses of bilateral   supraclinoid segments.    4.  Multiple right upper lobe pulmonary nodules measuring up to 5 mm.   Dedicated chest CT may be obtained on an outpatient basis.    --- End of Report ---    < end of copied text >  < from: CT Head No Cont (01.30.23 @ 15:22) >  IMPRESSION:    1.  No evidence of acute intracranial hemorrhage.    2.  Right frontal and parietal lobe infarcts, chronic in appearance but   new since the prior head CT 7/8/2018. Correlate with medical history.      < end of copied text >  < from: MR Head No Cont (01.30.23 @ 22:18) >  IMPRESSION:    1.  No acute infarct or intracranial hemorrhage.    2.  Mild chronic microvascular changes and chronicinfarcts in the right   parietal lobe.    3.  Left vertebral artery signal abnormality consistent with severe V4   segment stenoses as correlated with CTA.    < end of copied text >    Assessment:   Patient is an 87 year old former smoker, Citizen of [place] speaking male, without significant known past medical history who presented with positional dizziness x 5 days. MRI reporting no acute infarct or hemorrhage, but chronic right parietal infarcts and CTA reporting left V4 stenosis, left ICA 80% stenosis, right ICA 70-80 % stenosis and moderate bilateral supraclinoid stenosis. Patient was admitted for evaluation / plan for right carotid stenting / angioplasty given chronic right sided infarcts and severe stenosis. Patient s/p cardiac cath POD 2, cleared for carotid DYANA procedure per cardiology. Patient this AM c/o diarrhea. Denies headache, weakness, lightheadedness, nausea.     Suggestions:    Carotid stenosis, dizziness   - Planning for carotid angioplasty / stenting of right carotid this coming Tuesday 2/7/22 pending continued IR and anesthesia availability.   - Will plan for NPO except meds on low dose IVF after midnight MONDAY in preparation for Tuesday procedure.   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     Discussed with Dr. López   [id number] neuroendovascular  Neuroendovascular Progress Note:     Emirati  #[id number]     HPI:  Patient is an 87 year old former smoker, Gambian speaking male, without significant known past medical history who presented with positional dizziness x 5 days. MRI reporting no acute infarct or hemorrhage, but chronic right parietal infarcts and CTA reporting left V4 stenosis, left ICA 80% stenosis, right ICA 70-80 % stenosis and moderate bilateral supraclinoid stenosis. Patient was admitted for evaluation / plan for right carotid stenting / angioplasty given chronic right sided infarcts and severe stenosis. Patient s/p cardiac cath POD 2, cleared for carotid DYANA procedure per cardiology. Patient this AM c/o diarrhea. Denies headache, weakness, lightheadedness, nausea.     Past medical history:   Kidney stones  Urinary tract infection, E. coli    Medications:   aspirin enteric coated 325 milliGRAM(s) Oral daily  atorvastatin 80 milliGRAM(s) Oral at bedtime  clopidogrel Tablet 75 milliGRAM(s) Oral every 24 hours  lactated ringers. 500 milliLiter(s) IV Continuous <Continuous>  metoprolol tartrate 25 milliGRAM(s) Oral two times a day  pantoprazole    Tablet 40 milliGRAM(s) Oral before breakfast  sacubitril 24 mG/valsartan 26 mG 1 Tablet(s) Oral two times a day    Vitals:   T(F): 98.3 (02-03-23 @ 05:04), Max: 98.3 (02-03-23 @ 05:04)  HR: 81 (02-03-23 @ 11:32) (69 - 81)  BP: 135/73 (02-03-23 @ 11:32) (98/71 - 135/73)  RR: 18 (02-03-23 @ 05:04) (16 - 18)  SpO2: --    Labs:                         15.7   9.02  )-----------( 234      ( 03 Feb 2023 05:30 )             47.5   02-03  141  |  108  |  26<H>  ----------------------------<  114<H>  3.8   |  22  |  1.3  Ca    8.3<L>      03 Feb 2023 05:30  TPro  6.4  /  Alb  4.0  /  TBili  0.6  /  DBili  x   /  AST  12  /  ALT  8   /  AlkPhos  53  02-03  PT/INR - ( 03 Feb 2023 05:30 )   PT: 12.00 sec;   INR: 1.05 ratio    LIVER FUNCTIONS - ( 03 Feb 2023 05:30 )  Alb: 4.0 g/dL / Pro: 6.4 g/dL / ALK PHOS: 53 U/L / ALT: 8 U/L / AST: 12 U/L / GGT: x           Exam:   General - NAD   Neuro - AAO3, follows commands, EOMI, visual fields full, face symmetric, moving all extremities to antigravity, sensation intact, no dysarthria or aphasia,     Radiology:   Imaging reviewed per neuroendovascular ACP/attending.   < from: CT Angio Neck w/ IV Cont (01.30.23 @ 15:24) >  IMPRESSION:    1.  Atheromatous plaque at the proximal right ICA producing 70-80%   stenosis.    2.  Atheromatous plaque of the left ICA origin with about 80% stenosis.    3.  Calcific plaque of the distal ICAs with moderate stenosis of the   right cavernous segment and moderate/severe stenoses of bilateral   supraclinoid segments.    4.  Multiple right upper lobe pulmonary nodules measuring up to 5 mm.   Dedicated chest CT may be obtained on an outpatient basis.    --- End of Report ---    < end of copied text >  < from: CT Head No Cont (01.30.23 @ 15:22) >  IMPRESSION:    1.  No evidence of acute intracranial hemorrhage.    2.  Right frontal and parietal lobe infarcts, chronic in appearance but   new since the prior head CT 7/8/2018. Correlate with medical history.      < end of copied text >  < from: MR Head No Cont (01.30.23 @ 22:18) >  IMPRESSION:    1.  No acute infarct or intracranial hemorrhage.    2.  Mild chronic microvascular changes and chronicinfarcts in the right   parietal lobe.    3.  Left vertebral artery signal abnormality consistent with severe V4   segment stenoses as correlated with CTA.    < end of copied text >    Assessment:   Patient is an 87 year old former smoker, Emirati speaking male, without significant known past medical history who presented with positional dizziness x 5 days. MRI reporting no acute infarct or hemorrhage, but chronic right parietal infarcts and CTA reporting left V4 stenosis, left ICA 80% stenosis, right ICA 70-80 % stenosis and moderate bilateral supraclinoid stenosis. Patient was admitted for evaluation / plan for right carotid stenting / angioplasty given chronic right sided infarcts and severe stenosis. Patient s/p cardiac cath POD 2, cleared for carotid DYANA procedure per cardiology. Patient this AM c/o diarrhea. Denies headache, weakness, lightheadedness, nausea.     Suggestions:    Carotid stenosis, dizziness   - Planning for carotid angioplasty / stenting of right carotid this coming Tuesday 2/7/22 pending continued IR and anesthesia availability.   - Will plan for NPO except meds on low dose IVF after midnight MONDAY in preparation for Tuesday procedure.   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     Discussed with Dr. López   [id number] neuroendovascular  Neuroendovascular Progress Note:     [place]  #[id number]     HPI:  Patient is an 87 year old former smoker, Nauruan speaking male, without significant known past medical history who presented with positional dizziness x 5 days. MRI reporting no acute infarct or hemorrhage, but chronic right parietal infarcts and CTA reporting left V4 stenosis, left ICA 80% stenosis, right ICA 70-80 % stenosis and moderate bilateral supraclinoid stenosis. Patient was admitted for evaluation / plan for right carotid stenting / angioplasty given chronic right sided infarcts and severe stenosis. Patient s/p cardiac cath POD 2, cleared for carotid DYANA procedure per cardiology. Patient this AM c/o diarrhea. Denies headache, weakness, lightheadedness, nausea.     Past medical history:   Kidney stones  Urinary tract infection, E. coli    Medications:   aspirin enteric coated 325 milliGRAM(s) Oral daily  atorvastatin 80 milliGRAM(s) Oral at bedtime  clopidogrel Tablet 75 milliGRAM(s) Oral every 24 hours  lactated ringers. 500 milliLiter(s) IV Continuous <Continuous>  metoprolol tartrate 25 milliGRAM(s) Oral two times a day  pantoprazole    Tablet 40 milliGRAM(s) Oral before breakfast  sacubitril 24 mG/valsartan 26 mG 1 Tablet(s) Oral two times a day    Vitals:   T(F): 98.3 (02-03-23 @ 05:04), Max: 98.3 (02-03-23 @ 05:04)  HR: 81 (02-03-23 @ 11:32) (69 - 81)  BP: 135/73 (02-03-23 @ 11:32) (98/71 - 135/73)  RR: 18 (02-03-23 @ 05:04) (16 - 18)  SpO2: --    Labs:                         15.7   9.02  )-----------( 234      ( 03 Feb 2023 05:30 )             47.5   02-03  141  |  108  |  26<H>  ----------------------------<  114<H>  3.8   |  22  |  1.3  Ca    8.3<L>      03 Feb 2023 05:30  TPro  6.4  /  Alb  4.0  /  TBili  0.6  /  DBili  x   /  AST  12  /  ALT  8   /  AlkPhos  53  02-03  PT/INR - ( 03 Feb 2023 05:30 )   PT: 12.00 sec;   INR: 1.05 ratio    LIVER FUNCTIONS - ( 03 Feb 2023 05:30 )  Alb: 4.0 g/dL / Pro: 6.4 g/dL / ALK PHOS: 53 U/L / ALT: 8 U/L / AST: 12 U/L / GGT: x           Exam:   General - NAD   Neuro - AAO3, follows commands, EOMI, visual fields full, face symmetric, moving all extremities to antigravity, sensation intact, no dysarthria or aphasia,     Radiology:   Imaging reviewed per neuroendovascular ACP/attending.   < from: CT Angio Neck w/ IV Cont (01.30.23 @ 15:24) >  IMPRESSION:    1.  Atheromatous plaque at the proximal right ICA producing 70-80%   stenosis.    2.  Atheromatous plaque of the left ICA origin with about 80% stenosis.    3.  Calcific plaque of the distal ICAs with moderate stenosis of the   right cavernous segment and moderate/severe stenoses of bilateral   supraclinoid segments.    4.  Multiple right upper lobe pulmonary nodules measuring up to 5 mm.   Dedicated chest CT may be obtained on an outpatient basis.    --- End of Report ---    < end of copied text >  < from: CT Head No Cont (01.30.23 @ 15:22) >  IMPRESSION:    1.  No evidence of acute intracranial hemorrhage.    2.  Right frontal and parietal lobe infarcts, chronic in appearance but   new since the prior head CT 7/8/2018. Correlate with medical history.      < end of copied text >  < from: MR Head No Cont (01.30.23 @ 22:18) >  IMPRESSION:    1.  No acute infarct or intracranial hemorrhage.    2.  Mild chronic microvascular changes and chronicinfarcts in the right   parietal lobe.    3.  Left vertebral artery signal abnormality consistent with severe V4   segment stenoses as correlated with CTA.    < end of copied text >    Assessment:   Patient is an 87 year old former smoker, [place]egalese speaking male, without significant known past medical history who presented with positional dizziness x 5 days. MRI reporting no acute infarct or hemorrhage, but chronic right parietal infarcts and CTA reporting left V4 stenosis, left ICA 80% stenosis, right ICA 70-80 % stenosis and moderate bilateral supraclinoid stenosis. Patient was admitted for evaluation / plan for right carotid stenting / angioplasty given chronic right sided infarcts and severe stenosis. Patient s/p cardiac cath POD 2, cleared for carotid DYANA procedure per cardiology. Patient this AM c/o diarrhea. Denies headache, weakness, lightheadedness, nausea.     Suggestions:    Carotid stenosis, dizziness   - Planning for carotid angioplasty / stenting of right carotid this coming Tuesday 2/7/22 pending continued IR and anesthesia availability.   - Will plan for NPO except meds on low dose IVF after midnight MONDAY in preparation for Tuesday procedure.   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     Discussed with Dr. López   [id number] neuroendovascular

## 2023-02-03 NOTE — PROGRESS NOTE ADULT - ASSESSMENT
87 year old former smoker, Serbian speaking male, no known significant past medical history who presented with positional dizziness x 5 days. Neurovascular team consulted and recommended MRI brain, which was negative for acute stroke but reporting chronic right parietal infarcts, CTA reporting 80% stenosis of the left ICA origin and 70-80% stenosis fo the right ICA. Patient admitted for further w/u with possible carotid artery angioplasty / stenting. 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     #dizziness, intermittent   #Carotid stenosis   - plan for possible carotid angioplasty/stenting; ; f/u with neuroendovascular/IR   - on ASA 81 QD and Plavix 75 QD   - on Lipitor 80 mg QD   -plan as per neuroendovascular    -neurology also following      #diarrhea, new onset  as per nursing staff patient having loose watery stools  4 episodes, liquidy/watery/yellowish, no blood in stool  would recommend checking C diff (although no recent antibiotic use)  check fecal calprotectin, stool lactoferrin  check GI PCR panel  fluid repletion (need to be cautious with reduced EF)  abx on hold for now as no fever , no leucocytosis, no blood in stool   in case of any fever spike/blood in stool, would recommend consulting ID and GI and patient needs to be started on antibiotics.   once infectious diarrhea is ruled out, imodium can be used on prn basis       #Cardiomyopathy with EF 38%, new onset?  #Significant 2-Vessel Coronary Artery Disease of the RCA and LAD  - on Metoprolol 25mg  BID with holding parameters <110    -on  Entresto 24/26 BID per cardiology recs holding parameters <110    - high-intensity statin, asa, plavix   -c/w tele   -follow cardiology recommendations     #Multiple right upper lobe pulmonary nodules measuring up to 5 mm  -outpatient PCP/pulm follow up  -prior history of smoking  -ex smoker quit 12 years ago  -need repeat imaging (CT chest) OP      please call hospitalist team with any questions.

## 2023-02-03 NOTE — CHART NOTE - NSCHARTNOTEFT_GEN_A_CORE
Neuroendovascular ACP at bedside after being informed patient's son at bedside had questions regarding procedure.   On arrival to bedside, patient's family was not present.   [place]  # [id number].   Patient states that his son went home shortly before ACP arrival. Patient was told this AM by myself that he could eat and drink, that NPO order was d/c, full diet in place, and is planned for procedure Tuesday with neuroendovascular pending BP stability and resolution of GI upset.   Patient was told following this AM encounter that he could not eat or drink despite no NPO order being in place.   Reiterated to patient, neurosurgery team, and nursing staff that no angiogram is being planned for today and to please reinstate diet as soon as able.   [id number] neuroendovascular Neuroendovascular ACP at bedside after being informed patient's son at bedside had questions regarding procedure.   On arrival to bedside, patient's family was not present.   Citizen of [place]  # [id number].   Patient states that his son went home shortly before ACP arrival. Patient was told this AM by myself that he could eat and drink, that NPO order was d/c, full diet in place, and is planned for procedure Tuesday with neuroendovascular pending BP stability and resolution of GI upset.   Patient was told following this AM encounter that he could not eat or drink despite no NPO order being in place.   Reiterated to patient, neurosurgery team, and nursing staff that no angiogram is being planned for today and to please reinstate diet as soon as able.   Will call patient's son for update from neuroendovascular team.   [id number]

## 2023-02-03 NOTE — PROGRESS NOTE ADULT - SUBJECTIVE AND OBJECTIVE BOX
Subjective: 87yMale with a pmhx of LEFT CAROTID ARTERY STENOSIS    ^DIZZY    No pertinent family history in first degree relatives    Handoff    MEWS Score    Kidney stones    Urinary tract infection, E. 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 Offers no new complaints.  Awake, alert. Follows commands. c/o diarrhea.    Allergies    No Known Allergies    Intolerances        Imaging:     Vital Signs Last 24 Hrs  T(C): 35.6 (03 Feb 2023 12:32), Max: 36.8 (03 Feb 2023 05:04)  T(F): 96.1 (03 Feb 2023 12:32), Max: 98.3 (03 Feb 2023 05:04)  HR: 87 (03 Feb 2023 12:32) (69 - 87)  BP: 132/89 (03 Feb 2023 12:32) (98/71 - 135/73)  BP(mean): --  RR: 18 (03 Feb 2023 12:32) (16 - 18)  SpO2: --      acetaminophen     Tablet .. 650 milliGRAM(s) Oral every 6 hours PRN  aspirin enteric coated 325 milliGRAM(s) Oral daily  atorvastatin 80 milliGRAM(s) Oral at bedtime  clopidogrel Tablet 75 milliGRAM(s) Oral every 24 hours  heparin   Injectable 5000 Unit(s) SubCutaneous every 8 hours  lactated ringers. 500 milliLiter(s) IV Continuous <Continuous>  metoprolol tartrate 25 milliGRAM(s) Oral two times a day  ondansetron Injectable 4 milliGRAM(s) IV Push every 6 hours PRN  pantoprazole    Tablet 40 milliGRAM(s) Oral before breakfast  sacubitril 24 mG/valsartan 26 mG 1 Tablet(s) Oral two times a day        02-03-23 @ 07:01  -  02-03-23 @ 17:54  --------------------------------------------------------  IN: 120 mL / OUT: 0 mL / NET: 120 mL        REVIEW OF SYSTEMS    [x ] A ten-point review of systems was otherwise negative except as noted.  [ ] Due to altered mental status/intubation, subjective information were not able to be obtained from the patient. History was obtained, to the extent possible, from review of the chart and collateral sources of information.      Neuro Exam:  AAOX3. Verbal function intact  facial motions symmetric  PERRL  no drift  Finger to Nose intact  Motor: MAEx4      CBC Full  -  ( 03 Feb 2023 05:30 )  WBC Count : 9.02 K/uL  RBC Count : 5.00 M/uL  Hemoglobin : 15.7 g/dL  Hematocrit : 47.5 %  Platelet Count - Automated : 234 K/uL  Mean Cell Volume : 95.0 fL  Mean Cell Hemoglobin : 31.4 pg  Mean Cell Hemoglobin Concentration : 33.1 g/dL  Auto Neutrophil # : x  Auto Lymphocyte # : x  Auto Monocyte # : x  Auto Eosinophil # : x  Auto Basophil # : x  Auto Neutrophil % : x  Auto Lymphocyte % : x  Auto Monocyte % : x  Auto Eosinophil % : x  Auto Basophil % : x    02-03    141  |  108  |  26<H>  ----------------------------<  114<H>  3.8   |  22  |  1.3    Ca    8.3<L>      03 Feb 2023 05:30    TPro  6.4  /  Alb  4.0  /  TBili  0.6  /  DBili  x   /  AST  12  /  ALT  8   /  AlkPhos  53  02-03    PT/INR - ( 03 Feb 2023 05:30 )   PT: 12.00 sec;   INR: 1.05 ratio             I&O's Detail    03 Feb 2023 07:01  -  03 Feb 2023 17:54  --------------------------------------------------------  IN:    Oral Fluid: 120 mL  Total IN: 120 mL    OUT:  Total OUT: 0 mL    Total NET: 120 mL        I&O's Summary    03 Feb 2023 07:01  -  03 Feb 2023 17:54  --------------------------------------------------------  IN: 120 mL / OUT: 0 mL / NET: 120 mL          Assessment/Plan:   Hospitalist: sent stool studies and CDiff for diarrhea  Cards: Significant 2-Vessel Coronary Artery Disease of the RCA and LAD, Continue aspirin 81mg, plavix 75mg, high intensity statin  NeuroIR: plan for repeat DSA 2/7/23 if B/P stable and GI issues resolve  cont current mgt  d/w attg

## 2023-02-04 LAB
ALBUMIN SERPL ELPH-MCNC: 4 G/DL — SIGNIFICANT CHANGE UP (ref 3.5–5.2)
ALP SERPL-CCNC: 52 U/L — SIGNIFICANT CHANGE UP (ref 30–115)
ALT FLD-CCNC: 9 U/L — SIGNIFICANT CHANGE UP (ref 0–41)
ANION GAP SERPL CALC-SCNC: 14 MMOL/L — SIGNIFICANT CHANGE UP (ref 7–14)
AST SERPL-CCNC: 12 U/L — SIGNIFICANT CHANGE UP (ref 0–41)
BILIRUB SERPL-MCNC: 1 MG/DL — SIGNIFICANT CHANGE UP (ref 0.2–1.2)
BUN SERPL-MCNC: 31 MG/DL — HIGH (ref 10–20)
CALCIUM SERPL-MCNC: 8.4 MG/DL — SIGNIFICANT CHANGE UP (ref 8.4–10.5)
CHLORIDE SERPL-SCNC: 106 MMOL/L — SIGNIFICANT CHANGE UP (ref 98–110)
CO2 SERPL-SCNC: 15 MMOL/L — LOW (ref 17–32)
CREAT SERPL-MCNC: 1.1 MG/DL — SIGNIFICANT CHANGE UP (ref 0.7–1.5)
EGFR: 66 ML/MIN/1.73M2 — SIGNIFICANT CHANGE UP
GI PCR PANEL: DETECTED
GLUCOSE SERPL-MCNC: 117 MG/DL — HIGH (ref 70–99)
HCT VFR BLD CALC: 47.5 % — SIGNIFICANT CHANGE UP (ref 42–52)
HGB BLD-MCNC: 16 G/DL — SIGNIFICANT CHANGE UP (ref 14–18)
INR BLD: 1.08 RATIO — SIGNIFICANT CHANGE UP (ref 0.65–1.3)
MCHC RBC-ENTMCNC: 30.7 PG — SIGNIFICANT CHANGE UP (ref 27–31)
MCHC RBC-ENTMCNC: 33.7 G/DL — SIGNIFICANT CHANGE UP (ref 32–37)
MCV RBC AUTO: 91.2 FL — SIGNIFICANT CHANGE UP (ref 80–94)
NOROVIRUS GI+II RNA STL QL NAA+NON-PROBE: DETECTED
NRBC # BLD: 0 /100 WBCS — SIGNIFICANT CHANGE UP (ref 0–0)
PLATELET # BLD AUTO: 247 K/UL — SIGNIFICANT CHANGE UP (ref 130–400)
POTASSIUM SERPL-MCNC: 3.5 MMOL/L — SIGNIFICANT CHANGE UP (ref 3.5–5)
POTASSIUM SERPL-SCNC: 3.5 MMOL/L — SIGNIFICANT CHANGE UP (ref 3.5–5)
PROT SERPL-MCNC: 6.7 G/DL — SIGNIFICANT CHANGE UP (ref 6–8)
PROTHROM AB SERPL-ACNC: 12.3 SEC — SIGNIFICANT CHANGE UP (ref 9.95–12.87)
RBC # BLD: 5.21 M/UL — SIGNIFICANT CHANGE UP (ref 4.7–6.1)
RBC # FLD: 14 % — SIGNIFICANT CHANGE UP (ref 11.5–14.5)
SODIUM SERPL-SCNC: 135 MMOL/L — SIGNIFICANT CHANGE UP (ref 135–146)
WBC # BLD: 10.6 K/UL — SIGNIFICANT CHANGE UP (ref 4.8–10.8)
WBC # FLD AUTO: 10.6 K/UL — SIGNIFICANT CHANGE UP (ref 4.8–10.8)

## 2023-02-04 PROCEDURE — 99231 SBSQ HOSP IP/OBS SF/LOW 25: CPT

## 2023-02-04 RX ADMIN — HEPARIN SODIUM 5000 UNIT(S): 5000 INJECTION INTRAVENOUS; SUBCUTANEOUS at 05:38

## 2023-02-04 RX ADMIN — SACUBITRIL AND VALSARTAN 1 TABLET(S): 24; 26 TABLET, FILM COATED ORAL at 17:18

## 2023-02-04 RX ADMIN — Medication 25 MILLIGRAM(S): at 17:18

## 2023-02-04 RX ADMIN — Medication 25 MILLIGRAM(S): at 05:38

## 2023-02-04 RX ADMIN — SACUBITRIL AND VALSARTAN 1 TABLET(S): 24; 26 TABLET, FILM COATED ORAL at 05:38

## 2023-02-04 RX ADMIN — HEPARIN SODIUM 5000 UNIT(S): 5000 INJECTION INTRAVENOUS; SUBCUTANEOUS at 22:03

## 2023-02-04 RX ADMIN — HEPARIN SODIUM 5000 UNIT(S): 5000 INJECTION INTRAVENOUS; SUBCUTANEOUS at 13:00

## 2023-02-04 RX ADMIN — PANTOPRAZOLE SODIUM 40 MILLIGRAM(S): 20 TABLET, DELAYED RELEASE ORAL at 05:38

## 2023-02-04 RX ADMIN — Medication 325 MILLIGRAM(S): at 12:55

## 2023-02-04 RX ADMIN — CLOPIDOGREL BISULFATE 75 MILLIGRAM(S): 75 TABLET, FILM COATED ORAL at 18:09

## 2023-02-04 RX ADMIN — ATORVASTATIN CALCIUM 80 MILLIGRAM(S): 80 TABLET, FILM COATED ORAL at 22:03

## 2023-02-04 NOTE — CONSULT NOTE ADULT - TIME BILLING
I have personally seen and examined this patient.    I have reviewed all pertinent clinical information and reviewed all relevant imaging and diagnostic studies personally.   I counseled the patient about diagnostic testing and treatment plan. All questions were answered.   I discussed recommendations with the primary team.
Review of imaging and chart; obtaining history; examination of pt; discussion and coordination of care.

## 2023-02-04 NOTE — PROGRESS NOTE ADULT - ASSESSMENT
87 year old former smoker, Filipino speaking male, no known significant past medical history who presented with positional dizziness x 5 days. Neurovascular team consulted and recommended MRI brain, which was negative for acute stroke but reporting chronic right parietal infarcts, CTA reporting 80% stenosis of the left ICA origin and 70-80% stenosis fo the right ICA. Patient admitted for further w/u with possible carotid artery angioplasty / stenting. 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     #dizziness, intermittent   #Carotid stenosis   - plan for possible carotid angioplasty/stenting; ; f/u with neuroendovascular/IR   - on ASA 81 QD and Plavix 75 QD   - on Lipitor 80 mg QD   -plan as per neuroendovascular    -neurology also following      #Noro virus, diarrhea, new onset, resolving   much improved   positive for noro virus  ID following   no abx for now  follow pending labs.       #Cardiomyopathy with EF 38%, new onset?  #Significant 2-Vessel Coronary Artery Disease of the RCA and LAD  - on Metoprolol 25mg  BID with holding parameters <110    -on  Entresto 24/26 BID per cardiology recs holding parameters <110    - high-intensity statin, asa, plavix   -c/w tele   -follow cardiology recommendations     #Multiple right upper lobe pulmonary nodules measuring up to 5 mm  -outpatient PCP/pulm follow up  -prior history of smoking  -ex smoker quit 12 years ago  -need repeat imaging (CT chest) OP      please call hospitalist team with any questions.

## 2023-02-04 NOTE — CONSULT NOTE ADULT - ASSESSMENT
ASSESSMENT   87-year-old, [place]an-speaking male with no significant past medical history (but reports to have not seen a doctor in over 2 years) who presented with positional dizziness (when getting up from seated position) x 5 days.     IMPRESSION  #Carotid stenosis   #Norovirus infection   #CAD/CHF    Recommendations  - supportive care for norovirus -- seems to be improving now   - contact precaution   - recall as needed    Please call or message on [employer] Teams if with any questions.  Spectra [phone]

## 2023-02-04 NOTE — PROGRESS NOTE ADULT - SUBJECTIVE AND OBJECTIVE BOX
NEUROSURGERY NOTE  LOIS KANG   02-04-23 @ 17:42    PAST 24HR EVENTS:  HD# 3   Patient seen and examined at bedside using Kyrgyz .   Patient denies HA, N/V, or dizziness. States that his diarrhea has improved overnight.     HPI: 87y Male     PHYSICAL EXAM:  Vital Signs Last 24 Hrs  T(C): 36.1 (04 Feb 2023 14:13), Max: 36.4 (04 Feb 2023 04:36)  T(F): 97 (04 Feb 2023 14:13), Max: 97.6 (04 Feb 2023 04:36)  HR: 69 (04 Feb 2023 14:13) (69 - 85)  BP: 112/61 (04 Feb 2023 14:13) (112/61 - 132/81)  BP(mean): --  RR: 18 (04 Feb 2023 14:13) (18 - 18)  SpO2: 98% (03 Feb 2023 20:01) (98% - 98%)      I&O's Detail    03 Feb 2023 07:01  -  04 Feb 2023 07:00  --------------------------------------------------------  IN:    Oral Fluid: 780 mL  Total IN: 780 mL    OUT:    Voided (mL): 500 mL  Total OUT: 500 mL    Total NET: 280 mL      04 Feb 2023 07:01  -  04 Feb 2023 17:42  --------------------------------------------------------  IN:    Oral Fluid: 526 mL  Total IN: 526 mL    OUT:  Total OUT: 0 mL    Total NET: 526 mL        I&O's Summary    03 Feb 2023 07:01  -  04 Feb 2023 07:00  --------------------------------------------------------  IN: 780 mL / OUT: 500 mL / NET: 280 mL    04 Feb 2023 07:01  -  04 Feb 2023 17:42  --------------------------------------------------------  IN: 526 mL / OUT: 0 mL / NET: 526 mL    Awake, alert, following commands   Ox3   PERRL, EOMI   MAEx4 with good strength   SILT    MEDS:   acetaminophen     Tablet .. 650 milliGRAM(s) Oral every 6 hours PRN  aspirin enteric coated 325 milliGRAM(s) Oral daily  atorvastatin 80 milliGRAM(s) Oral at bedtime  clopidogrel Tablet 75 milliGRAM(s) Oral every 24 hours  heparin   Injectable 5000 Unit(s) SubCutaneous every 8 hours  lactated ringers. 500 milliLiter(s) IV Continuous <Continuous>  metoprolol tartrate 25 milliGRAM(s) Oral two times a day  ondansetron Injectable 4 milliGRAM(s) IV Push every 6 hours PRN  pantoprazole    Tablet 40 milliGRAM(s) Oral before breakfast  sacubitril 24 mG/valsartan 26 mG 1 Tablet(s) Oral two times a day      LABS:                        16.0   10.60 )-----------( 247      ( 04 Feb 2023 07:45 )             47.5     02-04    135  |  106  |  31<H>  ----------------------------<  117<H>  3.5   |  15<L>  |  1.1    Ca    8.4      04 Feb 2023 07:45    TPro  6.7  /  Alb  4.0  /  TBili  1.0  /  DBili  x   /  AST  12  /  ALT  9   /  AlkPhos  52  02-04    PT/INR - ( 04 Feb 2023 07:45 )   PT: 12.30 sec;   INR: 1.08 ratio             RADIOLOGY:

## 2023-02-04 NOTE — PROGRESS NOTE ADULT - SUBJECTIVE AND OBJECTIVE BOX
LOIS KANG  [facility] T6-3C 014 B ([facility] T6-3C)      Patient is a 87y old  Male who presents with a chief complaint of dizziness (01 Feb 2023 14:52)        Interval events:  Patient seen and examined at bedside.   no acute issue overnight  patient doing better. d/w nursing staff  diarrhea much improved     -PMHx: Kidney stones    Urinary tract infection, E. coli      -PSHx:H/O lithotripsy            REVIEW OF SYSTEMS:  CONSTITUTIONAL: No fever, weight loss, or fatigue  RESPIRATORY: No cough, wheezing, chills or hemoptysis; No shortness of breath  CARDIOVASCULAR: No chest pain, palpitations, dizziness, or leg swelling  GASTROINTESTINAL: No abdominal or epigastric pain. No nausea, vomiting, or hematemesis; No diarrhea or constipation. No melena or hematochezia.  NEUROLOGICAL: as above   LYMPH NODES: No enlarged glands  MUSCULOSKELETAL: No joint pain or swelling; No muscle, back, or extremity pain      Vital Signs Last 24 Hrs  T(C): 35.6 (03 Feb 2023 12:32), Max: 36.8 (03 Feb 2023 05:04)  T(F): 96.1 (03 Feb 2023 12:32), Max: 98.3 (03 Feb 2023 05:04)  HR: 87 (03 Feb 2023 12:32) (69 - 87)  BP: 132/89 (03 Feb 2023 12:32) (98/71 - 135/73)  BP(mean): --  RR: 18 (03 Feb 2023 12:32) (16 - 18)  SpO2: --            PHYSICAL EXAM:  GENERAL: Patient lying on bed, comfortable   CHEST/LUNG: NVB, no wheezing   HEART: R1+R2, RRR  ABDOMEN: Soft. non tender, BS positive  EXTREMITIES:  no edema, Bruising  CNS: AAAx4. No cranial nerves deficit.       LABS:      LABS:                        15.7   9.02  )-----------( 234      ( 03 Feb 2023 05:30 )             47.5     02-03    141  |  108  |  26<H>  ----------------------------<  114<H>  3.8   |  22  |  1.3    Ca    8.3<L>      03 Feb 2023 05:30    TPro  6.4  /  Alb  4.0  /  TBili  0.6  /  DBili  x   /  AST  12  /  ALT  8   /  AlkPhos  53  02-03    PT/INR - ( 03 Feb 2023 05:30 )   PT: 12.00 sec;   INR: 1.05 ratio                   Microbiology:      RADIOLOGY & ADDITIONAL TESTS:  FINDINGS:     Coronary Dominance: Right    LM: Minimal luminal irregularities.   LAD: Proximal: Minor irregularities. Mid: Severe diffuse disease. Distal: Focal 80% lesion.   Diag1: Severe ostial lesion. 70% lesion of proximal vessel. Mild diffuse disease.    LCx: Proximal: Minor irregularities. Distal: Moderate diffuse disease.   OM: Moderate diffuse disease.   RCA: Proximal RCA with mild disease. Mid with moderate disease and an 80% lesion. Distal: 95% lesion. DANIS 3 flow.   rPDA: moderate disease   RPL: Mild disease.       Medications:  acetaminophen     Tablet .. 650 milliGRAM(s) Oral every 6 hours PRN  aspirin enteric coated 325 milliGRAM(s) Oral daily  atorvastatin 80 milliGRAM(s) Oral at bedtime  clopidogrel Tablet 75 milliGRAM(s) Oral every 24 hours  metoprolol tartrate 25 milliGRAM(s) Oral two times a day  ondansetron Injectable 4 milliGRAM(s) IV Push every 6 hours PRN  pantoprazole    Tablet 40 milliGRAM(s) Oral before breakfast  sacubitril 24 mG/valsartan 26 mG 1 Tablet(s) Oral two times a day  sodium chloride 0.9% Bolus 250 milliLiter(s) IV Bolus once

## 2023-02-04 NOTE — CONSULT NOTE ADULT - SUBJECTIVE AND OBJECTIVE BOX
LOIS KANG  87y, Male  Allergy: No Known Allergies      CHIEF COMPLAINT: dizziness (01 Feb 2023 14:52)      LOS  4d    HPI:  The patient is an 87-year-old, Chinese-speaking male with no significant past medical history (but reports to have not seen a doctor in over 2 years) who presented with positional dizziness (when getting up from seated position) x 5 days. 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 The patient was examined at bedside and has no acute complaints. The patient denies current dizziness, headache, nausea, numbness/tingling, vision changes, and unusual fatigue.   [id number] used for this encounter.   (31 Jan 2023 13:24)      INFECTIOUS DISEASE HISTORY:  History as above.  ID consulted for Norovirus in stool PCR testing.   Has been having diarrhea, but improving now.   Appetite is improving.   Denies abdominal pain, nausea, vomiting.   Feels well overall.     PAST MEDICAL & SURGICAL HISTORY:  Kidney stones      Urinary tract infection, E. coli      H/O lithotripsy          FAMILY HISTORY  No pertinent family history in first degree relatives        SOCIAL HISTORY  Social History:  Denies etoh use, drug use    ROS  General: Denies rigors, nightsweats  HEENT: Denies headache, rhinorrhea, sore throat, eye pain  CV: Denies CP, palpitations  PULM: Denies wheezing, hemoptysis  GI: Denies hematemesis, hematochezia, melena  : Denies discharge, hematuria  MSK: Denies arthralgias, myalgias  SKIN: Denies rash, lesions  NEURO: Denies paresthesias, weakness  PSYCH: Denies depression, anxiety    VITALS:  T(F): 97.6, Max: 97.6 (02-04-23 @ 04:36)  HR: 80  BP: 122/65  RR: 18Vital Signs Last 24 Hrs  T(C): 36.4 (04 Feb 2023 04:36), Max: 36.4 (04 Feb 2023 04:36)  T(F): 97.6 (04 Feb 2023 04:36), Max: 97.6 (04 Feb 2023 04:36)  HR: 80 (04 Feb 2023 04:36) (80 - 85)  BP: 122/65 (04 Feb 2023 04:36) (122/65 - 132/81)  BP(mean): --  RR: 18 (04 Feb 2023 04:36) (18 - 18)  SpO2: 98% (03 Feb 2023 20:01) (98% - 98%)        PHYSICAL EXAM:  Gen: NAD, resting in bed  HEENT: Normocephalic, atraumatic  Neck: supple, no lymphadenopathy  CV: Regular rate & regular rhythm  Lungs: decreased BS at bases, no fremitus  Abdomen: Soft, BS present  Ext: Warm, well perfused  Neuro: non focal, awake  Skin: no rash, no erythema  Lines: no phlebitis    TESTS & MEASUREMENTS:                        16.0   10.60 )-----------( 247      ( 04 Feb 2023 07:45 )             47.5     02-04    135  |  106  |  31<H>  ----------------------------<  117<H>  3.5   |  15<L>  |  1.1    Ca    8.4      04 Feb 2023 07:45    TPro  6.7  /  Alb  4.0  /  TBili  1.0  /  DBili  x   /  AST  12  /  ALT  9   /  AlkPhos  52  02-04      LIVER FUNCTIONS - ( 04 Feb 2023 07:45 )  Alb: 4.0 g/dL / Pro: 6.7 g/dL / ALK PHOS: 52 U/L / ALT: 9 U/L / AST: 12 U/L / GGT: x                     INFECTIOUS DISEASES TESTING  COVID-19 PCR: NotDetec (01-30-23 @ 13:37)      RADIOLOGY & ADDITIONAL TESTS:  I have personally reviewed the last Chest xray  CXR      CT      CARDIOLOGY TESTING  12 Lead ECG:   Ventricular Rate 77 BPM    Atrial Rate 77 BPM    P-R Interval 174 ms    QRS Duration 134 ms    Q-T Interval 436 ms    QTC Calculation(Bazett) 493 ms    P Axis 25 degrees    R Axis -84 degrees    T Axis 7 degrees    Diagnosis Line Normal sinus rhythm  Left axis deviation  Right bundle branch block  Abnormal ECG    Confirmed by Alex Lind ([id number]) on 2/2/2023 9:09:48 AM (02-01-23 @ 22:13)  12 Lead ECG:   Ventricular Rate 67 BPM    Atrial Rate 67 BPM    P-R Interval 166 ms    QRS Duration 142 ms    Q-T Interval 452 ms    QTC Calculation(Bazett) 477 ms    P Axis 51 degrees    R Axis -81 degrees    T Axis 1 degrees    Diagnosis Line Normal sinus rhythm  Non-specific intra-ventricular conduction block  Moderate voltage criteria for LVH, may be normal variant  Abnormal ECG    Confirmed by Kirsten Wilson MD ([id number]) on 1/31/2023 8:22:54 AM (01-30-23 @ 19:11)      MEDICATIONS  aspirin enteric coated 325 Oral daily  atorvastatin 80 Oral at bedtime  clopidogrel Tablet 75 Oral every 24 hours  heparin   Injectable 5000 SubCutaneous every 8 hours  lactated ringers. 500 IV Continuous <Continuous>  metoprolol tartrate 25 Oral two times a day  pantoprazole    Tablet 40 Oral before breakfast  sacubitril 24 mG/valsartan 26 mG 1 Oral two times a day      Weight  Weight (kg): 71.214 (02-01-23 @ 15:46)    ANTIBIOTICS:      ALLERGIES:  No Known Allergies

## 2023-02-04 NOTE — PROGRESS NOTE ADULT - ASSESSMENT
87M with carotid stenosis presenting with positional dizziness here for DSA with Dr. López    PLAN   - Cardiac clearance in   - Continue ASA/Plavix   - + Stool cultures for Norovirus: per ID, no need for antibiotics and continue to monitor   - DSA with RHS 2.7  - Appreciate Neuro IR recc   - D/W attending

## 2023-02-05 DIAGNOSIS — I65.22 OCCLUSION AND STENOSIS OF LEFT CAROTID ARTERY: ICD-10-CM

## 2023-02-05 DIAGNOSIS — F44.5 CONVERSION DISORDER WITH SEIZURES OR CONVULSIONS: ICD-10-CM

## 2023-02-05 LAB
ALBUMIN SERPL ELPH-MCNC: 3.7 G/DL — SIGNIFICANT CHANGE UP (ref 3.5–5.2)
ALP SERPL-CCNC: 47 U/L — SIGNIFICANT CHANGE UP (ref 30–115)
ALT FLD-CCNC: 7 U/L — SIGNIFICANT CHANGE UP (ref 0–41)
ANION GAP SERPL CALC-SCNC: 13 MMOL/L — SIGNIFICANT CHANGE UP (ref 7–14)
AST SERPL-CCNC: 11 U/L — SIGNIFICANT CHANGE UP (ref 0–41)
BILIRUB SERPL-MCNC: 1 MG/DL — SIGNIFICANT CHANGE UP (ref 0.2–1.2)
BUN SERPL-MCNC: 28 MG/DL — HIGH (ref 10–20)
CALCIUM SERPL-MCNC: 8.4 MG/DL — SIGNIFICANT CHANGE UP (ref 8.4–10.5)
CHLORIDE SERPL-SCNC: 108 MMOL/L — SIGNIFICANT CHANGE UP (ref 98–110)
CO2 SERPL-SCNC: 17 MMOL/L — SIGNIFICANT CHANGE UP (ref 17–32)
CREAT SERPL-MCNC: 1.2 MG/DL — SIGNIFICANT CHANGE UP (ref 0.7–1.5)
EGFR: 59 ML/MIN/1.73M2 — LOW
GLUCOSE SERPL-MCNC: 113 MG/DL — HIGH (ref 70–99)
HCT VFR BLD CALC: 44.2 % — SIGNIFICANT CHANGE UP (ref 42–52)
HGB BLD-MCNC: 15.3 G/DL — SIGNIFICANT CHANGE UP (ref 14–18)
MCHC RBC-ENTMCNC: 31.7 PG — HIGH (ref 27–31)
MCHC RBC-ENTMCNC: 34.6 G/DL — SIGNIFICANT CHANGE UP (ref 32–37)
MCV RBC AUTO: 91.5 FL — SIGNIFICANT CHANGE UP (ref 80–94)
NRBC # BLD: 0 /100 WBCS — SIGNIFICANT CHANGE UP (ref 0–0)
PLATELET # BLD AUTO: 237 K/UL — SIGNIFICANT CHANGE UP (ref 130–400)
POTASSIUM SERPL-MCNC: 3.6 MMOL/L — SIGNIFICANT CHANGE UP (ref 3.5–5)
POTASSIUM SERPL-SCNC: 3.6 MMOL/L — SIGNIFICANT CHANGE UP (ref 3.5–5)
PROT SERPL-MCNC: 6.1 G/DL — SIGNIFICANT CHANGE UP (ref 6–8)
RBC # BLD: 4.83 M/UL — SIGNIFICANT CHANGE UP (ref 4.7–6.1)
RBC # FLD: 13.9 % — SIGNIFICANT CHANGE UP (ref 11.5–14.5)
SODIUM SERPL-SCNC: 138 MMOL/L — SIGNIFICANT CHANGE UP (ref 135–146)
WBC # BLD: 9.36 K/UL — SIGNIFICANT CHANGE UP (ref 4.8–10.8)
WBC # FLD AUTO: 9.36 K/UL — SIGNIFICANT CHANGE UP (ref 4.8–10.8)

## 2023-02-05 PROCEDURE — 99232 SBSQ HOSP IP/OBS MODERATE 35: CPT

## 2023-02-05 RX ADMIN — CLOPIDOGREL BISULFATE 75 MILLIGRAM(S): 75 TABLET, FILM COATED ORAL at 17:31

## 2023-02-05 RX ADMIN — HEPARIN SODIUM 5000 UNIT(S): 5000 INJECTION INTRAVENOUS; SUBCUTANEOUS at 16:58

## 2023-02-05 RX ADMIN — HEPARIN SODIUM 5000 UNIT(S): 5000 INJECTION INTRAVENOUS; SUBCUTANEOUS at 21:51

## 2023-02-05 RX ADMIN — PANTOPRAZOLE SODIUM 40 MILLIGRAM(S): 20 TABLET, DELAYED RELEASE ORAL at 05:40

## 2023-02-05 RX ADMIN — SACUBITRIL AND VALSARTAN 1 TABLET(S): 24; 26 TABLET, FILM COATED ORAL at 17:31

## 2023-02-05 RX ADMIN — Medication 25 MILLIGRAM(S): at 17:31

## 2023-02-05 RX ADMIN — HEPARIN SODIUM 5000 UNIT(S): 5000 INJECTION INTRAVENOUS; SUBCUTANEOUS at 05:39

## 2023-02-05 RX ADMIN — ATORVASTATIN CALCIUM 80 MILLIGRAM(S): 80 TABLET, FILM COATED ORAL at 21:51

## 2023-02-05 RX ADMIN — Medication 325 MILLIGRAM(S): at 12:26

## 2023-02-05 NOTE — PROGRESS NOTE ADULT - ASSESSMENT
87 year old former smoker, Botswanan speaking male, no known significant past medical history who presented with positional dizziness x 5 days. Neurovascular team consulted and recommended MRI brain, which was negative for acute stroke but reporting chronic right parietal infarcts, CTA reporting 80% stenosis of the left ICA origin and 70-80% stenosis fo the right ICA. Patient admitted for further w/u with possible carotid artery angioplasty / stenting. 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     # Dizziness, intermittent   # Carotid stenosis   - plan for possible carotid angioplasty/stenting; ; f/u with neuroendovascular/IR   - on ASA 81 QD and Plavix 75 QD   - on Lipitor 80 mg QD   - plan as per neuroendovascular    - neurology also following      # Norovirus, diarrhea, new onset, resolving   - much improved   - positive for norovirus  - ID following   - no abx for now  - follow pending labs      # Cardiomyopathy with EF 38%, new onset?  # Significant 2-Vessel Coronary Artery Disease of the RCA and LAD  - on Metoprolol 25mg  BID with holding parameters <110    - on  Entresto 24/26 BID per cardiology recs holding parameters <110    - high-intensity statin, asa, plavix   - c/w tele   - follow cardiology recommendations     #Multiple right upper lobe pulmonary nodules measuring up to 5 mm  - outpatient PCP/pulm follow up  - prior history of smoking  - ex smoker quit 12 years ago  - need repeat imaging (CT chest) OP          87 year old former smoker, Dominican speaking male, no known significant past medical history who presented with positional dizziness x 5 days. Neurovascular team consulted and recommended MRI brain, which was negative for acute stroke but reporting chronic right parietal infarcts, CTA reporting 80% stenosis of the left ICA origin and 70-80% stenosis fo the right ICA. Patient admitted for further w/u with possible carotid artery angioplasty / stenting. 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     # Dizziness, intermittent   # Carotid stenosis   - plan for possible carotid angioplasty/stenting; ; f/u with neuroendovascular/IR   - on ASA 81 QD and Plavix 75 QD   - on Lipitor 80 mg QD   - plan as per neuroendovascular    - neurology also following      # Norovirus, diarrhea, new onset, resolving   - much improved   - positive for norovirus  - ID following   - no abx for now  - follow pending labs    # Cardiomyopathy with EF 38%, new onset?  # Significant 2-Vessel Coronary Artery Disease of the RCA and LAD  - on Metoprolol 25mg  BID with holding parameters <110    - on Entresto 24/26 BID per cardiology recs holding parameters <110    - high-intensity statin, asa, plavix   - c/w tele   - follow cardiology recommendations     # Multiple right upper lobe pulmonary nodules measuring up to 5 mm  - outpatient PCP/pulm follow up  - prior history of smoking  - ex smoker quit 12 years ago  - need repeat imaging (CT chest) OP    # DVT prophylaxis  - on heparin subcut

## 2023-02-05 NOTE — PROGRESS NOTE ADULT - SUBJECTIVE AND OBJECTIVE BOX
LOIS KANG  87y Male    Patient is a 87y old  Male who presents with a chief complaint of dizziness.    INTERVAL HPI/OVERNIGHT EVENTS:    ROS: Pt is seen and examined at bedside. Pt denies dizziness. Diarrhea has resolved. No further BM since yesterday.    Overnight events: No acute events overnight.    Vital Signs Last 24 Hrs  T(C): 36.4 (05 Feb 2023 13:47), Max: 36.4 (05 Feb 2023 13:47)  T(F): 97.6 (05 Feb 2023 13:47), Max: 97.6 (05 Feb 2023 13:47)  HR: 79 (05 Feb 2023 13:47) (65 - 79)  BP: 113/60 (05 Feb 2023 13:47) (104/55 - 129/60)  BP(mean): --  RR: 18 (05 Feb 2023 13:47) (18 - 18)  SpO2: --      No Known Allergies    MEDICATIONS  (STANDING):  aspirin enteric coated 325 milliGRAM(s) Oral daily  atorvastatin 80 milliGRAM(s) Oral at bedtime  clopidogrel Tablet 75 milliGRAM(s) Oral every 24 hours  heparin   Injectable 5000 Unit(s) SubCutaneous every 8 hours  lactated ringers. 500 milliLiter(s) (50 mL/Hr) IV Continuous <Continuous>  metoprolol tartrate 25 milliGRAM(s) Oral two times a day  pantoprazole    Tablet 40 milliGRAM(s) Oral before breakfast  sacubitril 24 mG/valsartan 26 mG 1 Tablet(s) Oral two times a day    MEDICATIONS  (PRN):  acetaminophen     Tablet .. 650 milliGRAM(s) Oral every 6 hours PRN Temp greater or equal to 38C (100.4F), Mild Pain (1 - 3)  ondansetron Injectable 4 milliGRAM(s) IV Push every 6 hours PRN Nausea and/or Vomiting      PHYSICAL EXAM:  General Appearance: NAD, normal for age and gender. 	  Neck: Normal JVP, no bruit.   Eyes: Conjunctiva clear, Extra Ocular muscles intact. No scleral icterus.  ENMT: Moist oral mucosa. No oral lesion.  Cardiovascular: Regular rate and rhythm S1 S2, No JVD, systolic murmur.  Respiratory: Lungs clear to auscultation. No wheezes, rales or rhonchi.  Psychiatry: Alert and oriented x 3, Mood & affect appropriate.  Gastrointestinal:  Soft, Non-tender, Non-distended.  Neurologic: Non-focal deficits.  Musculoskeletal/ extremities: Move all extremities. No clubbing, cyanosis or edema.  Vascular: Peripheral pulses palpable bilaterally.  Skin/Integumen: No rashes, No ecchymoses, No cyanosis.      LABS:                        15.3   9.36  )-----------( 237      ( 05 Feb 2023 05:42 )             44.2     02-05    138  |  108  |  28<H>  ----------------------------<  113<H>  3.6   |  17  |  1.2    Ca    8.4      05 Feb 2023 05:42    TPro  6.1  /  Alb  3.7  /  TBili  1.0  /  DBili  x   /  AST  11  /  ALT  7   /  AlkPhos  47  02-05    PT/INR - ( 04 Feb 2023 07:45 )   PT: 12.30 sec;   INR: 1.08 ratio               RADIOLOGY & ADDITIONAL TESTS:

## 2023-02-05 NOTE — PROGRESS NOTE ADULT - SUBJECTIVE AND OBJECTIVE BOX
HD #4       pt seen and examined at bedside pt without complaints          Vital Signs Last 24 Hrs  T(C): 36.4 (05 Feb 2023 13:47), Max: 36.4 (05 Feb 2023 13:47)  T(F): 97.6 (05 Feb 2023 13:47), Max: 97.6 (05 Feb 2023 13:47)  HR: 75 (05 Feb 2023 17:35) (65 - 79)  BP: 124/66 (05 Feb 2023 17:35) (104/55 - 129/60)  BP(mean): --  RR: 18 (05 Feb 2023 17:35) (18 - 18)  SpO2: 99% (05 Feb 2023 17:35) (99% - 99%)    Parameters below as of 05 Feb 2023 17:35  Patient On (Oxygen Delivery Method): room air        PHYSICAL EXAM:    Awake, alert, following commands   Ox3   PERRL, EOMI   MAEx4 with good strength   SILT      MEDICATIONS:  Antibiotics:    Neuro:  acetaminophen     Tablet .. 650 milliGRAM(s) Oral every 6 hours PRN  ondansetron Injectable 4 milliGRAM(s) IV Push every 6 hours PRN    Anticoagulation:  aspirin enteric coated 325 milliGRAM(s) Oral daily  clopidogrel Tablet 75 milliGRAM(s) Oral every 24 hours  heparin   Injectable 5000 Unit(s) SubCutaneous every 8 hours    OTHER:  atorvastatin 80 milliGRAM(s) Oral at bedtime  metoprolol tartrate 25 milliGRAM(s) Oral two times a day  pantoprazole    Tablet 40 milliGRAM(s) Oral before breakfast  sacubitril 24 mG/valsartan 26 mG 1 Tablet(s) Oral two times a day    IVF:  lactated ringers. 500 milliLiter(s) IV Continuous <Continuous>        LABS:                        15.3   9.36  )-----------( 237      ( 05 Feb 2023 05:42 )             44.2     02-05    138  |  108  |  28<H>  ----------------------------<  113<H>  3.6   |  17  |  1.2    Ca    8.4      05 Feb 2023 05:42    TPro  6.1  /  Alb  3.7  /  TBili  1.0  /  DBili  x   /  AST  11  /  ALT  7   /  AlkPhos  47  02-05    PT/INR - ( 04 Feb 2023 07:45 )   PT: 12.30 sec;   INR: 1.08 ratio               A/p                         - Cardiac clearance done                        - Continue ASA/Plavix                        - + Stool cultures for Norovirus: per ID, no need for antibiotics and continue to monitor                         - DSA with RHS 2.7

## 2023-02-06 LAB
ALBUMIN SERPL ELPH-MCNC: 3.5 G/DL — SIGNIFICANT CHANGE UP (ref 3.5–5.2)
ALP SERPL-CCNC: 46 U/L — SIGNIFICANT CHANGE UP (ref 30–115)
ALT FLD-CCNC: 7 U/L — SIGNIFICANT CHANGE UP (ref 0–41)
ANION GAP SERPL CALC-SCNC: 9 MMOL/L — SIGNIFICANT CHANGE UP (ref 7–14)
AST SERPL-CCNC: 11 U/L — SIGNIFICANT CHANGE UP (ref 0–41)
BILIRUB SERPL-MCNC: 0.8 MG/DL — SIGNIFICANT CHANGE UP (ref 0.2–1.2)
BLD GP AB SCN SERPL QL: SIGNIFICANT CHANGE UP
BUN SERPL-MCNC: 25 MG/DL — HIGH (ref 10–20)
CALCIUM SERPL-MCNC: 8.5 MG/DL — SIGNIFICANT CHANGE UP (ref 8.4–10.5)
CHLORIDE SERPL-SCNC: 105 MMOL/L — SIGNIFICANT CHANGE UP (ref 98–110)
CO2 SERPL-SCNC: 23 MMOL/L — SIGNIFICANT CHANGE UP (ref 17–32)
CREAT SERPL-MCNC: 1.2 MG/DL — SIGNIFICANT CHANGE UP (ref 0.7–1.5)
EGFR: 59 ML/MIN/1.73M2 — LOW
GLUCOSE SERPL-MCNC: 118 MG/DL — HIGH (ref 70–99)
HCT VFR BLD CALC: 43.9 % — SIGNIFICANT CHANGE UP (ref 42–52)
HGB BLD-MCNC: 14.7 G/DL — SIGNIFICANT CHANGE UP (ref 14–18)
MCHC RBC-ENTMCNC: 31.3 PG — HIGH (ref 27–31)
MCHC RBC-ENTMCNC: 33.5 G/DL — SIGNIFICANT CHANGE UP (ref 32–37)
MCV RBC AUTO: 93.6 FL — SIGNIFICANT CHANGE UP (ref 80–94)
NRBC # BLD: 0 /100 WBCS — SIGNIFICANT CHANGE UP (ref 0–0)
PLATELET # BLD AUTO: 232 K/UL — SIGNIFICANT CHANGE UP (ref 130–400)
POTASSIUM SERPL-MCNC: 5 MMOL/L — SIGNIFICANT CHANGE UP (ref 3.5–5)
POTASSIUM SERPL-SCNC: 5 MMOL/L — SIGNIFICANT CHANGE UP (ref 3.5–5)
PROT SERPL-MCNC: 5.9 G/DL — LOW (ref 6–8)
RBC # BLD: 4.69 M/UL — LOW (ref 4.7–6.1)
RBC # FLD: 13.9 % — SIGNIFICANT CHANGE UP (ref 11.5–14.5)
SODIUM SERPL-SCNC: 137 MMOL/L — SIGNIFICANT CHANGE UP (ref 135–146)
WBC # BLD: 10.38 K/UL — SIGNIFICANT CHANGE UP (ref 4.8–10.8)
WBC # FLD AUTO: 10.38 K/UL — SIGNIFICANT CHANGE UP (ref 4.8–10.8)

## 2023-02-06 PROCEDURE — 99233 SBSQ HOSP IP/OBS HIGH 50: CPT

## 2023-02-06 RX ORDER — SODIUM CHLORIDE 9 MG/ML
1000 INJECTION INTRAMUSCULAR; INTRAVENOUS; SUBCUTANEOUS
Refills: 0 | Status: DISCONTINUED | OUTPATIENT
Start: 2023-02-06 | End: 2023-02-07

## 2023-02-06 RX ADMIN — HEPARIN SODIUM 5000 UNIT(S): 5000 INJECTION INTRAVENOUS; SUBCUTANEOUS at 06:25

## 2023-02-06 RX ADMIN — ATORVASTATIN CALCIUM 80 MILLIGRAM(S): 80 TABLET, FILM COATED ORAL at 22:41

## 2023-02-06 RX ADMIN — HEPARIN SODIUM 5000 UNIT(S): 5000 INJECTION INTRAVENOUS; SUBCUTANEOUS at 14:32

## 2023-02-06 RX ADMIN — CLOPIDOGREL BISULFATE 75 MILLIGRAM(S): 75 TABLET, FILM COATED ORAL at 17:25

## 2023-02-06 RX ADMIN — SACUBITRIL AND VALSARTAN 1 TABLET(S): 24; 26 TABLET, FILM COATED ORAL at 17:17

## 2023-02-06 RX ADMIN — Medication 325 MILLIGRAM(S): at 11:05

## 2023-02-06 RX ADMIN — PANTOPRAZOLE SODIUM 40 MILLIGRAM(S): 20 TABLET, DELAYED RELEASE ORAL at 06:25

## 2023-02-06 RX ADMIN — SODIUM CHLORIDE 45 MILLILITER(S): 9 INJECTION INTRAMUSCULAR; INTRAVENOUS; SUBCUTANEOUS at 22:42

## 2023-02-06 RX ADMIN — Medication 25 MILLIGRAM(S): at 17:17

## 2023-02-06 NOTE — PROGRESS NOTE ADULT - SUBJECTIVE AND OBJECTIVE BOX
LOIS KANG  87y  Male      Patient is a 87y old  Male who presents with a chief complaint of Dizziness, carotid artery stenosis (06 Feb 2023 13:54)      INTERVAL HPI/OVERNIGHT EVENTS:  He feels ok, diarrhea improved.   Vital Signs Last 24 Hrs  T(C): 35.8 (06 Feb 2023 12:58), Max: 35.9 (06 Feb 2023 04:52)  T(F): 96.5 (06 Feb 2023 12:58), Max: 96.7 (06 Feb 2023 04:52)  HR: 80 (06 Feb 2023 12:58) (65 - 81)  BP: 114/56 (06 Feb 2023 12:58) (98/54 - 124/66)  BP(mean): 67 (06 Feb 2023 06:19) (67 - 67)  RR: 18 (06 Feb 2023 12:58) (18 - 18)  SpO2: 97% (06 Feb 2023 06:19) (97% - 99%)    Parameters below as of 06 Feb 2023 06:19  Patient On (Oxygen Delivery Method): room air          02-05-23 @ 07:01  -  02-06-23 @ 07:00  --------------------------------------------------------  IN: 502 mL / OUT: 0 mL / NET: 502 mL    02-06-23 @ 07:01  - 02-06-23 @ 16:30  --------------------------------------------------------  IN: 120 mL / OUT: 0 mL / NET: 120 mL            Consultant(s) Notes Reviewed:  [x ] YES  [ ] NO          MEDICATIONS  (STANDING):  aspirin enteric coated 325 milliGRAM(s) Oral daily  atorvastatin 80 milliGRAM(s) Oral at bedtime  clopidogrel Tablet 75 milliGRAM(s) Oral every 24 hours  heparin   Injectable 5000 Unit(s) SubCutaneous every 8 hours  lactated ringers. 500 milliLiter(s) (50 mL/Hr) IV Continuous <Continuous>  metoprolol tartrate 25 milliGRAM(s) Oral two times a day  pantoprazole    Tablet 40 milliGRAM(s) Oral before breakfast  sacubitril 24 mG/valsartan 26 mG 1 Tablet(s) Oral two times a day    MEDICATIONS  (PRN):  acetaminophen     Tablet .. 650 milliGRAM(s) Oral every 6 hours PRN Temp greater or equal to 38C (100.4F), Mild Pain (1 - 3)  ondansetron Injectable 4 milliGRAM(s) IV Push every 6 hours PRN Nausea and/or Vomiting      LABS                          14.7   10.38 )-----------( 232      ( 06 Feb 2023 04:47 )             43.9     02-06    137  |  105  |  25<H>  ----------------------------<  118<H>  5.0   |  23  |  1.2    Ca    8.5      06 Feb 2023 04:47    TPro  5.9<L>  /  Alb  3.5  /  TBili  0.8  /  DBili  x   /  AST  11  /  ALT  7   /  AlkPhos  46  02-06          Lactate Trend        CAPILLARY BLOOD GLUCOSE            RADIOLOGY & ADDITIONAL TESTS:    Imaging Personally Reviewed:  [ ] YES  [ ] NO    HEALTH ISSUES - PROBLEM Dx:          PHYSICAL EXAM:  GENERAL: NAD, well-developed.  HEAD:  Atraumatic, Normocephalic.  EYES: EOMI, PERRLA, conjunctiva and sclera clear.  NECK: Supple, No JVD.  CHEST/LUNG: Clear to auscultation bilaterally; No wheeze.  HEART: Regular rate and rhythm; S1 S2.   ABDOMEN: Soft, Nontender, Nondistended; Bowel sounds present.  EXTREMITIES:  2+ Peripheral Pulses, No clubbing, cyanosis, or edema.  PSYCH: AAOx3.  NEUROLOGY: non-focal.  SKIN: No rashes or lesions.

## 2023-02-06 NOTE — PROGRESS NOTE ADULT - SUBJECTIVE AND OBJECTIVE BOX
Neuroendovascular Progress Note:     Interval History:   The patient has been experiencing diarrhea x 3 days and GI panel was positive for Norovirus. Infectious disease was consulted and recommends supportive care. On exam today the patient reports to have had one episode of loose stools at midnight last night, but has not had a bowel movement since then and reports to be feeling better. The patient denies abdominal pain, nausea, vomiting, dizziness, and headache. On exam, he is afebrile and AAOx3 with hypotension improved. Informed consent was obtained for a diagnostic cerebral angiogram + planned carotid artery angioplasty and stenting tomorrow with Dr. López. [place]ian  [id number] was used for this encounter.    Past Medical History:  Kidney stones  Urinary tract infection, E. coli    24-Hour Events: None    Medication:  aspirin enteric coated 325 milliGRAM(s) Oral daily  atorvastatin 80 milliGRAM(s) Oral at bedtime  clopidogrel Tablet 75 milliGRAM(s) Oral every 24 hours  heparin   Injectable 5000 Unit(s) SubCutaneous every 8 hours  lactated ringers. 500 milliLiter(s) IV Continuous <Continuous>  metoprolol tartrate 25 milliGRAM(s) Oral two times a day  pantoprazole    Tablet 40 milliGRAM(s) Oral before breakfast  sacubitril 24 mG/valsartan 26 mG 1 Tablet(s) Oral two times a day    No Known Allergies    Recent Vitals:  T(F): 96.5 (02-06-23 @ 12:58), Max: 96.7 (02-06-23 @ 04:52)  HR: 80 (02-06-23 @ 12:58) (65 - 81)  BP: 114/56 (02-06-23 @ 12:58) (98/54 - 124/66)  RR: 18 (02-06-23 @ 12:58) (18 - 18)  SpO2: 97% (02-06-23 @ 06:19) (97% - 99%)    COVID-19 PCR: NotDetec (30 Jan 2023 13:37)    Recent Labs:                        14.7   10.38 )-----------( 232      ( 06 Feb 2023 04:47 )             43.9     02-06    137  |  105  |  25<H>  ----------------------------<  118<H>  5.0   |  23  |  1.2    Ca    8.5      06 Feb 2023 04:47    TPro  5.9<L>  /  Alb  3.5  /  TBili  0.8  /  DBili  x   /  AST  11  /  ALT  7   /  AlkPhos  46  02-06    LIVER FUNCTIONS - ( 06 Feb 2023 04:47 )  Alb: 3.5 g/dL / Pro: 5.9 g/dL / ALK PHOS: 46 U/L / ALT: 7 U/L / AST: 11 U/L / GGT: x           Exam:  General: No acute distress  Mental Status: AAOx3, no dysarthria or aphasia, recent and remote memory intact, appropriate fund of knowledge.   CN: PERRL, full visual fields, no nystagmus, EOMI, V1-V3 intact b/l and symmetric, face symmetric,  tongue midline.  Motor: 5/5 b/l UE/LE,  strength 5/5. Normal bulk and tone. No abnormal movements.   Sensation: Intact to light touch throughout.   Coordination: No dysmetria on finger-to-nose and heel-to-shin.   NIHSS: 0    Radiology:   Recent radiological imaging reviewed.    Assessment:   The patient is an 87-year-old, Lithuanian-speaking male with no significant past medical history (but reports to have not seen a doctor in over 2 years) who presented with positional dizziness (when getting up from seated position) x 5 days. 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 +Norovirus and recovering. Cr. 1.2, eGFR 59, BUN 25 today. Afebrile.    Suggestions:  - The patient is tentatively scheduled for a diagnostic cerebral angiogram + carotid angioplasty and stenting with Dr. López tomorrow AM. Please keep NPO except medication after  midnight tonight and on IV normal saline at 50 mL/hr. AM coag/CBC/CMP.   - Continued monitoring of Ins and Outs, BUN, and Creatinine.   - Infectious disease input regarding procedural clearance/ precautions given Norovirus preciated.   [id number] Neuroendovascular Progress Note:     Interval History:   The patient has been experiencing diarrhea x 3 days and GI panel was positive for Norovirus. Infectious disease was consulted and recommends supportive care. On exam today the patient reports to have had one episode of loose stools at midnight last night, but has not had a bowel movement since then and reports to be feeling better. The patient denies abdominal pain, nausea, vomiting, dizziness, and headache. On exam, he is afebrile and AAOx3 with hypotension improved. Informed consent was obtained for a diagnostic cerebral angiogram + planned carotid artery angioplasty and stenting tomorrow with Dr. López. Chadian  [id number] was used for this encounter.    Past Medical History:  Kidney stones  Urinary tract infection, E. coli    24-Hour Events: None    Medication:  aspirin enteric coated 325 milliGRAM(s) Oral daily  atorvastatin 80 milliGRAM(s) Oral at bedtime  clopidogrel Tablet 75 milliGRAM(s) Oral every 24 hours  heparin   Injectable 5000 Unit(s) SubCutaneous every 8 hours  lactated ringers. 500 milliLiter(s) IV Continuous <Continuous>  metoprolol tartrate 25 milliGRAM(s) Oral two times a day  pantoprazole    Tablet 40 milliGRAM(s) Oral before breakfast  sacubitril 24 mG/valsartan 26 mG 1 Tablet(s) Oral two times a day    No Known Allergies    Recent Vitals:  T(F): 96.5 (02-06-23 @ 12:58), Max: 96.7 (02-06-23 @ 04:52)  HR: 80 (02-06-23 @ 12:58) (65 - 81)  BP: 114/56 (02-06-23 @ 12:58) (98/54 - 124/66)  RR: 18 (02-06-23 @ 12:58) (18 - 18)  SpO2: 97% (02-06-23 @ 06:19) (97% - 99%)    COVID-19 PCR: NotDetec (30 Jan 2023 13:37)    Recent Labs:                        14.7   10.38 )-----------( 232      ( 06 Feb 2023 04:47 )             43.9     02-06    137  |  105  |  25<H>  ----------------------------<  118<H>  5.0   |  23  |  1.2    Ca    8.5      06 Feb 2023 04:47    TPro  5.9<L>  /  Alb  3.5  /  TBili  0.8  /  DBili  x   /  AST  11  /  ALT  7   /  AlkPhos  46  02-06    LIVER FUNCTIONS - ( 06 Feb 2023 04:47 )  Alb: 3.5 g/dL / Pro: 5.9 g/dL / ALK PHOS: 46 U/L / ALT: 7 U/L / AST: 11 U/L / GGT: x           Exam:  General: No acute distress  Mental Status: AAOx3, no dysarthria or aphasia, recent and remote memory intact, appropriate fund of knowledge.   CN: PERRL, full visual fields, no nystagmus, EOMI, V1-V3 intact b/l and symmetric, face symmetric,  tongue midline.  Motor: 5/5 b/l UE/LE,  strength 5/5. Normal bulk and tone. No abnormal movements.   Sensation: Intact to light touch throughout.   Coordination: No dysmetria on finger-to-nose and heel-to-shin.   NIHSS: 0    Radiology:   Recent radiological imaging reviewed.    Assessment:   The patient is an 87-year-old, Chadian-speaking male with no significant past medical history (but reports to have not seen a doctor in over 2 years) who presented with positional dizziness (when getting up from seated position) x 5 days. 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 S/p cardiac cath and noted to have cardiomyopathy with EF 38%; +Norovirus on GI panel. Cr. 1.2, eGFR 59, BUN 25 today. Afebrile.    Suggestions:  - The patient is tentatively scheduled for a diagnostic cerebral angiogram + carotid angioplasty and stenting with Dr. López tomorrow AM. Please keep NPO except medication after  midnight tonight and on IV normal saline at 50 mL/hr. AM coag/CBC/CMP.   - Continued monitoring of Ins and Outs, BUN, and Creatinine.   - Infectious disease input regarding procedural clearance/ precautions given Norovirus preciated.   [id number]

## 2023-02-06 NOTE — PROGRESS NOTE ADULT - ASSESSMENT
87 year old former smoker, British speaking male, no known significant past medical history who presented with positional dizziness x 5 days. Neurovascular team consulted and recommended MRI brain, which was negative for acute stroke but reporting chronic right parietal infarcts, CTA reporting 80% stenosis of the left ICA origin and 70-80% stenosis fo the right ICA. Patient admitted for further w/u with possible carotid artery angioplasty / stenting. 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  Significant 2-Vessel Coronary Artery Disease of the RCA and LAD  Continue Metoprolol 25mg  BID with holding parameters <110  and Entresto 24/26 BID per cardiology recs holding parameters <110    Continue ASA, Plavix and Lipitor.     Multiple right upper lobe pulmonary nodules measuring up to 5 mm  ex smoker quit 12 years ago, need repeat imaging (CT chest) OP    DVT prophylaxis  - on heparin subcut

## 2023-02-07 ENCOUNTER — TRANSCRIPTION ENCOUNTER (OUTPATIENT)
Age: 88
End: 2023-02-07

## 2023-02-07 ENCOUNTER — APPOINTMENT (OUTPATIENT)
Dept: NEUROSURGERY | Facility: HOSPITAL | Age: 88
End: 2023-02-07

## 2023-02-07 LAB
ALBUMIN SERPL ELPH-MCNC: 3.4 G/DL — LOW (ref 3.5–5.2)
ALP SERPL-CCNC: 42 U/L — SIGNIFICANT CHANGE UP (ref 30–115)
ALT FLD-CCNC: 7 U/L — SIGNIFICANT CHANGE UP (ref 0–41)
ANION GAP SERPL CALC-SCNC: 8 MMOL/L — SIGNIFICANT CHANGE UP (ref 7–14)
APTT BLD: 27.4 SEC — SIGNIFICANT CHANGE UP (ref 27–39.2)
AST SERPL-CCNC: 10 U/L — SIGNIFICANT CHANGE UP (ref 0–41)
BILIRUB SERPL-MCNC: 0.9 MG/DL — SIGNIFICANT CHANGE UP (ref 0.2–1.2)
BUN SERPL-MCNC: 16 MG/DL — SIGNIFICANT CHANGE UP (ref 10–20)
CALCIUM SERPL-MCNC: 8.2 MG/DL — LOW (ref 8.4–10.5)
CHLORIDE SERPL-SCNC: 111 MMOL/L — HIGH (ref 98–110)
CO2 SERPL-SCNC: 22 MMOL/L — SIGNIFICANT CHANGE UP (ref 17–32)
CREAT SERPL-MCNC: 0.9 MG/DL — SIGNIFICANT CHANGE UP (ref 0.7–1.5)
EGFR: 84 ML/MIN/1.73M2 — SIGNIFICANT CHANGE UP
GLUCOSE SERPL-MCNC: 113 MG/DL — HIGH (ref 70–99)
HCT VFR BLD CALC: 39.6 % — LOW (ref 42–52)
HGB BLD-MCNC: 13.4 G/DL — LOW (ref 14–18)
INR BLD: 1.14 RATIO — SIGNIFICANT CHANGE UP (ref 0.65–1.3)
MCHC RBC-ENTMCNC: 31.5 PG — HIGH (ref 27–31)
MCHC RBC-ENTMCNC: 33.8 G/DL — SIGNIFICANT CHANGE UP (ref 32–37)
MCV RBC AUTO: 93 FL — SIGNIFICANT CHANGE UP (ref 80–94)
NRBC # BLD: 0 /100 WBCS — SIGNIFICANT CHANGE UP (ref 0–0)
PLATELET # BLD AUTO: 204 K/UL — SIGNIFICANT CHANGE UP (ref 130–400)
POTASSIUM SERPL-MCNC: 4.3 MMOL/L — SIGNIFICANT CHANGE UP (ref 3.5–5)
POTASSIUM SERPL-SCNC: 4.3 MMOL/L — SIGNIFICANT CHANGE UP (ref 3.5–5)
PROT SERPL-MCNC: 5.6 G/DL — LOW (ref 6–8)
PROTHROM AB SERPL-ACNC: 13.1 SEC — HIGH (ref 9.95–12.87)
RBC # BLD: 4.26 M/UL — LOW (ref 4.7–6.1)
RBC # FLD: 13.8 % — SIGNIFICANT CHANGE UP (ref 11.5–14.5)
SARS-COV-2 RNA SPEC QL NAA+PROBE: SIGNIFICANT CHANGE UP
SODIUM SERPL-SCNC: 141 MMOL/L — SIGNIFICANT CHANGE UP (ref 135–146)
WBC # BLD: 9.42 K/UL — SIGNIFICANT CHANGE UP (ref 4.8–10.8)
WBC # FLD AUTO: 9.42 K/UL — SIGNIFICANT CHANGE UP (ref 4.8–10.8)

## 2023-02-07 PROCEDURE — 76937 US GUIDE VASCULAR ACCESS: CPT | Mod: 26

## 2023-02-07 PROCEDURE — 36223 PLACE CATH CAROTID/INOM ART: CPT | Mod: 59,RT

## 2023-02-07 PROCEDURE — 37215 TRANSCATH STENT CCA W/EPS: CPT | Mod: LT

## 2023-02-07 PROCEDURE — 99233 SBSQ HOSP IP/OBS HIGH 50: CPT

## 2023-02-07 RX ORDER — ONDANSETRON 8 MG/1
4 TABLET, FILM COATED ORAL EVERY 6 HOURS
Refills: 0 | Status: DISCONTINUED | OUTPATIENT
Start: 2023-02-07 | End: 2023-02-10

## 2023-02-07 RX ORDER — ASPIRIN/CALCIUM CARB/MAGNESIUM 324 MG
325 TABLET ORAL DAILY
Refills: 0 | Status: DISCONTINUED | OUTPATIENT
Start: 2023-02-07 | End: 2023-02-10

## 2023-02-07 RX ORDER — CLOPIDOGREL BISULFATE 75 MG/1
75 TABLET, FILM COATED ORAL ONCE
Refills: 0 | Status: COMPLETED | OUTPATIENT
Start: 2023-02-07 | End: 2023-02-07

## 2023-02-07 RX ORDER — CLOPIDOGREL BISULFATE 75 MG/1
75 TABLET, FILM COATED ORAL DAILY
Refills: 0 | Status: DISCONTINUED | OUTPATIENT
Start: 2023-02-07 | End: 2023-02-10

## 2023-02-07 RX ORDER — SACUBITRIL AND VALSARTAN 24; 26 MG/1; MG/1
1 TABLET, FILM COATED ORAL
Refills: 0 | Status: DISCONTINUED | OUTPATIENT
Start: 2023-02-07 | End: 2023-02-08

## 2023-02-07 RX ORDER — METOPROLOL TARTRATE 50 MG
25 TABLET ORAL
Refills: 0 | Status: DISCONTINUED | OUTPATIENT
Start: 2023-02-07 | End: 2023-02-08

## 2023-02-07 RX ADMIN — Medication 25 MILLIGRAM(S): at 06:10

## 2023-02-07 RX ADMIN — Medication 325 MILLIGRAM(S): at 14:21

## 2023-02-07 RX ADMIN — PANTOPRAZOLE SODIUM 40 MILLIGRAM(S): 20 TABLET, DELAYED RELEASE ORAL at 06:10

## 2023-02-07 RX ADMIN — CLOPIDOGREL BISULFATE 75 MILLIGRAM(S): 75 TABLET, FILM COATED ORAL at 19:26

## 2023-02-07 RX ADMIN — SACUBITRIL AND VALSARTAN 1 TABLET(S): 24; 26 TABLET, FILM COATED ORAL at 06:10

## 2023-02-07 NOTE — PRE PROCEDURE NOTE - PRE PROCEDURE EVALUATION
HPI:  The patient is an 87-year-old, Citizen of [place]-speaking male with no significant past medical history (but reports to have not seen a doctor in over 2 years) who presented with positional dizziness (when getting up from seated position) x 5 days. 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 The patient was examined at bedside and has no acute complaints. The patient denies current dizziness, headache, nausea, numbness/tingling, vision changes, and unusual fatigue.   [id number] used for this encounter.   (31 Jan 2023 13:24)    Patient today is for diagnostic cerebral angiogram with planned endovascular carotid angioplasty / stenting.     NPO except meds since midnight.   IVF NS @ 50  NIHSS 0    Allergies: No Known Allergies    PAST MEDICAL & SURGICAL HISTORY:  Kidney stones  Urinary tract infection, E. coli  H/O lithotripsy    Current Medications: acetaminophen     Tablet .. 650 milliGRAM(s) Oral every 6 hours PRN  aspirin enteric coated 325 milliGRAM(s) Oral daily  atorvastatin 80 milliGRAM(s) Oral at bedtime  clopidogrel Tablet 75 milliGRAM(s) Oral every 24 hours  lactated ringers. 500 milliLiter(s) IV Continuous <Continuous>  metoprolol tartrate 25 milliGRAM(s) Oral two times a day  ondansetron Injectable 4 milliGRAM(s) IV Push every 6 hours PRN  pantoprazole    Tablet 40 milliGRAM(s) Oral before breakfast  sacubitril 24 mG/valsartan 26 mG 1 Tablet(s) Oral two times a day  sodium chloride 0.9%. 1000 milliLiter(s) IV Continuous <Continuous>    Labs:                         13.4   9.42  )-----------( 204      ( 07 Feb 2023 06:01 )             39.6     02-07    141  |  111<H>  |  16  ----------------------------<  113<H>  4.3   |  22  |  0.9  Ca    8.2<L>      07 Feb 2023 06:01  TPro  5.6<L>  /  Alb  3.4<L>  /  TBili  0.9  /  DBili  x   /  AST  10  /  ALT  7   /  AlkPhos  42  02-07    Blood Bank: 02-06-23  --  A POS  --    ECG:     Assessment/Plan:   This is a 87y  year old male presents with sx carotid stenosis.   Patient presents to neuro-IR for diagnostic cerebral angiogram with planned endovascular carotid angioplasty / stenting.   Procedure, goals, risks, benefits and alternatives  were discussed with patient and patient's family.  All questions were answered to best understanding.   Risks discussed include but are not limited to stroke, vessel injury, hemorrhage, and or arteriotomy site hematoma.   Patient / proxy demonstrates understanding  of all risks involved with this procedure and wishes to continue.     Appropriate consent was obtained and placed in the patient's chart.   neuro ICU upgrade following procedure.    HPI:  The patient is an 87-year-old, Emirati-speaking male with no significant past medical history (but reports to have not seen a doctor in over 2 years) who presented with positional dizziness (when getting up from seated position) x 5 days. 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 The patient was examined at bedside and has no acute complaints. The patient denies current dizziness, headache, nausea, numbness/tingling, vision changes, and unusual fatigue.   [id number] used for this encounter.   (31 Jan 2023 13:24)    Patient today is for diagnostic cerebral angiogram with planned endovascular carotid angioplasty / stenting.     NPO except meds since midnight.   IVF NS @ 50  NIHSS 0    Allergies: No Known Allergies    PAST MEDICAL & SURGICAL HISTORY:  Kidney stones  Urinary tract infection, E. coli  H/O lithotripsy    Current Medications: acetaminophen     Tablet .. 650 milliGRAM(s) Oral every 6 hours PRN  aspirin enteric coated 325 milliGRAM(s) Oral daily  atorvastatin 80 milliGRAM(s) Oral at bedtime  clopidogrel Tablet 75 milliGRAM(s) Oral every 24 hours  lactated ringers. 500 milliLiter(s) IV Continuous <Continuous>  metoprolol tartrate 25 milliGRAM(s) Oral two times a day  ondansetron Injectable 4 milliGRAM(s) IV Push every 6 hours PRN  pantoprazole    Tablet 40 milliGRAM(s) Oral before breakfast  sacubitril 24 mG/valsartan 26 mG 1 Tablet(s) Oral two times a day  sodium chloride 0.9%. 1000 milliLiter(s) IV Continuous <Continuous>    Labs:                         13.4   9.42  )-----------( 204      ( 07 Feb 2023 06:01 )             39.6     02-07    141  |  111<H>  |  16  ----------------------------<  113<H>  4.3   |  22  |  0.9  Ca    8.2<L>      07 Feb 2023 06:01  TPro  5.6<L>  /  Alb  3.4<L>  /  TBili  0.9  /  DBili  x   /  AST  10  /  ALT  7   /  AlkPhos  42  02-07    Blood Bank: 02-06-23  --  A POS  --    ECG:     Assessment/Plan:   This is a 87y  year old male presents with sx carotid stenosis.   Patient presents to neuro-IR for diagnostic cerebral angiogram with planned endovascular carotid angioplasty / stenting.   Procedure, goals, risks, benefits and alternatives  were discussed with patient and patient's family.  All questions were answered to best understanding.   Risks discussed include but are not limited to stroke, vessel injury, hemorrhage, and or arteriotomy site hematoma.   Patient / proxy demonstrates understanding  of all risks involved with this procedure and wishes to continue.     Appropriate consent was obtained and placed in the patient's chart.   neuro ICU upgrade following procedure.   Patient with neurovirus, with precautions for IR procedure.

## 2023-02-07 NOTE — PRE PROCEDURE NOTE - GENERAL PROCEDURE NAME
Diagnostic cerebral angiogram with planned endovascular carotid angioplasty/stenting
Diagnostic Cerebral Angiogram + Right DYANA

## 2023-02-07 NOTE — BRIEF OPERATIVE NOTE - OPERATION/FINDINGS
Procedure : Diagnostic cerebral angiogram + left Carotid angioplasty stenting     Contrast: Visipaque 320   IA medications: Heparin 3000 IU, Verapamil 2.5mg, Nitroglycerin 200 mcg   Implants placed: one stent placed in the left ICA   Complications : n/a         Preliminary Report:  Official IR neuro procedure note to follow.

## 2023-02-07 NOTE — PROGRESS NOTE ADULT - ASSESSMENT
87 year old former smoker, Lithuanian speaking male, no known significant past medical history who presented with positional dizziness x 5 days. Neurovascular team consulted and recommended MRI brain, which was negative for acute stroke but reporting chronic right parietal infarcts, CTA reporting 80% stenosis of the left ICA origin and 70-80% stenosis fo the right ICA. Patient admitted for further w/u with possible carotid artery angioplasty / stenting. 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     A/P:   Dizziness, intermittent   Carotid stenosis:   Brain MRI negative for acute CVA.   CT angio head and Neck: showed left V4 stenosis were identified, as well as left ICA origin 80% stenosis, right proximal ICA 70-80% stenosis, and moderate-severe bilateral supraclinoid stenosis.  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  Significant 2-Vessel Coronary Artery Disease of the RCA and LAD  Continue Metoprolol 25mg  BID with holding parameters <110  and Entresto 24/26 BID per cardiology recs holding parameters <110    Continue ASA, Plavix and Lipitor.     Multiple right upper lobe pulmonary nodules measuring up to 5 mm  ex smoker quit 12 years ago, need repeat imaging (CT chest) OP    DVT prophylaxis: heparin subcut

## 2023-02-07 NOTE — PROGRESS NOTE ADULT - SUBJECTIVE AND OBJECTIVE BOX
LOIS KANG  87y  Male      Patient is a 87y old  Male who presents with a chief complaint of Dizziness, carotid artery stenosis (06 Feb 2023 13:54)      INTERVAL HPI/OVERNIGHT EVENTS:  No new complaints.  Vital Signs Last 24 Hrs  T(C): 36.7 (07 Feb 2023 13:12), Max: 36.7 (06 Feb 2023 20:49)  T(F): 98 (07 Feb 2023 13:12), Max: 98.1 (06 Feb 2023 20:49)  HR: 97 (07 Feb 2023 15:40) (65 - 97)  BP: 101/51 (07 Feb 2023 13:12) (101/51 - 150/72)  BP(mean): 104 (07 Feb 2023 04:51) (85 - 104)  RR: 16 (07 Feb 2023 13:12) (16 - 18)  SpO2: 98% (07 Feb 2023 15:40) (98% - 98%)          02-06-23 @ 07:01  -  02-07-23 @ 07:00  --------------------------------------------------------  IN: 570 mL / OUT: 400 mL / NET: 170 mL    02-07-23 @ 07:01  - 02-07-23 @ 16:04  --------------------------------------------------------  IN: 0 mL / OUT: 300 mL / NET: -300 mL            Consultant(s) Notes Reviewed:  [x ] YES  [ ] NO          MEDICATIONS  (STANDING):  aspirin enteric coated 325 milliGRAM(s) Oral daily  atorvastatin 80 milliGRAM(s) Oral at bedtime  clopidogrel Tablet 75 milliGRAM(s) Oral every 24 hours  lactated ringers. 500 milliLiter(s) (50 mL/Hr) IV Continuous <Continuous>  metoprolol tartrate 25 milliGRAM(s) Oral two times a day  pantoprazole    Tablet 40 milliGRAM(s) Oral before breakfast  sacubitril 24 mG/valsartan 26 mG 1 Tablet(s) Oral two times a day  sodium chloride 0.9%. 1000 milliLiter(s) (45 mL/Hr) IV Continuous <Continuous>    MEDICATIONS  (PRN):  acetaminophen     Tablet .. 650 milliGRAM(s) Oral every 6 hours PRN Temp greater or equal to 38C (100.4F), Mild Pain (1 - 3)  ondansetron Injectable 4 milliGRAM(s) IV Push every 6 hours PRN Nausea and/or Vomiting      LABS                          13.4   9.42  )-----------( 204      ( 07 Feb 2023 06:01 )             39.6     02-07    141  |  111<H>  |  16  ----------------------------<  113<H>  4.3   |  22  |  0.9    Ca    8.2<L>      07 Feb 2023 06:01    TPro  5.6<L>  /  Alb  3.4<L>  /  TBili  0.9  /  DBili  x   /  AST  10  /  ALT  7   /  AlkPhos  42  02-07        PT/INR - ( 07 Feb 2023 06:01 )   PT: 13.10 sec;   INR: 1.14 ratio         PTT - ( 07 Feb 2023 06:01 )  PTT:27.4 sec  Lactate Trend        CAPILLARY BLOOD GLUCOSE            RADIOLOGY & ADDITIONAL TESTS:    Imaging Personally Reviewed:  [ ] YES  [ ] NO    HEALTH ISSUES - PROBLEM Dx:          PHYSICAL EXAM:  GENERAL: NAD, well-developed.  HEAD:  Atraumatic, Normocephalic.  EYES: EOMI, PERRLA, conjunctiva and sclera clear.  NECK: Supple, No JVD.  CHEST/LUNG: Clear to auscultation bilaterally; No wheeze.  HEART: Regular rate and rhythm; S1 S2.   ABDOMEN: Soft, Nontender, Nondistended; Bowel sounds present.  EXTREMITIES:  2+ Peripheral Pulses, No clubbing, cyanosis, or edema.  PSYCH: AAOx3.  NEUROLOGY: non-focal.  SKIN: No rashes or lesions.

## 2023-02-07 NOTE — OCCUPATIONAL THERAPY INITIAL EVALUATION ADULT - SPECIFY REASON(S)
The patient is scheduled for a diagnostic cerebral angiogram + DYANA/ angioplasty today with Dr. López. OT will follow up post procedure
Unable to complete OT IE at this time. Pt with active bedrest orders while groin sheaths are in place. OT to follow up once activity orders have been updated
Pt on continued bedrest. Awaiting activity- out of bed orders.

## 2023-02-07 NOTE — CHART NOTE - NSCHARTNOTEFT_GEN_A_CORE
Connected NSGY to IP.     Patient with norovirus -- symptoms seem to be improving although loose stool yesterday.     Continue isolation precautions.     Plan for procedure today     Will need cycle cleaning after procedure     Please call or message on Microsoft Teams if with any questions.  Spectra [phone]

## 2023-02-07 NOTE — BRIEF OPERATIVE NOTE - COMMENTS
Please follow post NI orders for neuro checks, distal pulses, vitals, and arteriotomy site checks placed for total of 24 hour recovery period following procedure.   Please keep reverse trendelenberg, knee immobilizer, bed rest x 6 hr.   Keep IVF as ordered.   SBP < 150      Patient tolerated procedure well, hemodynamically stable, no change in neurological status compared to baseline.   NIHSS pre procedure _0_ post procedure _0_  Right groin site CDI without evidence of bleeing hematoma oozing ecchymosis swelling at the time of closure. Site nontender to palpation. Temperature and color consistent bilaterally to palpation with intact distal pulses.     Please notify provider with any signs of bleeding or hematoma at arteriotomy site, change in mental status, vitals outside parameters, or absent distal pulses.   Management per neuro ICU   [id number]

## 2023-02-07 NOTE — CHART NOTE - NSCHARTNOTEFT_GEN_A_CORE
The patient is scheduled for a diagnostic cerebral angiogram + DYANA/ angioplasty today with Dr. López. Please keep NPO except medication and on IV normal saline at 50 ml/hr until the procedure start time.  [id number]

## 2023-02-07 NOTE — CHART NOTE - NSCHARTNOTEFT_GEN_A_CORE
Patient is for diagnostic cerebral angiogram with planned carotid stenting and angioplasty with the neuroendovascular team today.   Please keep patient NPO except meds and on IVF NS @ 50 cc/ hr while NPO.   Patient will require neuro ICU upgrade following DYANA procedure, ICU aware.   Continue patient on DAPT.     Continue care per neurosurgery and hospitalist team until upgrade post angio.     [id number] Patient is for diagnostic cerebral angiogram with planned carotid stenting and angioplasty with the neuroendovascular team today.   Please keep patient NPO except meds and on IVF NS @ 50 cc/ hr while NPO.   Patient will require neuro ICU upgrade following DYANA procedure, ICU aware.   Continue patient on DAPT.   Repeat COVID PCR pending - received at lab, confirmed.     Continue care per neurosurgery and hospitalist team until upgrade post angio.     [id number]

## 2023-02-07 NOTE — PRE-ANESTHESIA EVALUATION ADULT - NSANTHPMHFT_GEN_ALL_CORE
88 yo male with no sig prior PMH presented with dizziness x 5 days. CTA done showing bilateral carotid stenosis left 80% right 85%. Echo shows global LV dysfunction with EF 38% and mild AI/MR. Cardiac cath shows 2 vessel disease, on medical management.  Pt with Norovirus related diarrhea that is resolving.    PSH lithotripsy, right inguinal hernia repair

## 2023-02-08 LAB
ALBUMIN SERPL ELPH-MCNC: 3.2 G/DL — LOW (ref 3.5–5.2)
ALP SERPL-CCNC: 40 U/L — SIGNIFICANT CHANGE UP (ref 30–115)
ALT FLD-CCNC: 8 U/L — SIGNIFICANT CHANGE UP (ref 0–41)
ANION GAP SERPL CALC-SCNC: 8 MMOL/L — SIGNIFICANT CHANGE UP (ref 7–14)
AST SERPL-CCNC: 14 U/L — SIGNIFICANT CHANGE UP (ref 0–41)
BILIRUB SERPL-MCNC: 0.8 MG/DL — SIGNIFICANT CHANGE UP (ref 0.2–1.2)
BUN SERPL-MCNC: 17 MG/DL — SIGNIFICANT CHANGE UP (ref 10–20)
CALCIUM SERPL-MCNC: 8.1 MG/DL — LOW (ref 8.4–10.5)
CHLORIDE SERPL-SCNC: 106 MMOL/L — SIGNIFICANT CHANGE UP (ref 98–110)
CO2 SERPL-SCNC: 22 MMOL/L — SIGNIFICANT CHANGE UP (ref 17–32)
CREAT SERPL-MCNC: 0.8 MG/DL — SIGNIFICANT CHANGE UP (ref 0.7–1.5)
EGFR: 87 ML/MIN/1.73M2 — SIGNIFICANT CHANGE UP
GLUCOSE SERPL-MCNC: 132 MG/DL — HIGH (ref 70–99)
HCT VFR BLD CALC: 34.2 % — LOW (ref 42–52)
HGB BLD-MCNC: 11.8 G/DL — LOW (ref 14–18)
MCHC RBC-ENTMCNC: 31.6 PG — HIGH (ref 27–31)
MCHC RBC-ENTMCNC: 34.5 G/DL — SIGNIFICANT CHANGE UP (ref 32–37)
MCV RBC AUTO: 91.4 FL — SIGNIFICANT CHANGE UP (ref 80–94)
NRBC # BLD: 0 /100 WBCS — SIGNIFICANT CHANGE UP (ref 0–0)
PLATELET # BLD AUTO: 195 K/UL — SIGNIFICANT CHANGE UP (ref 130–400)
POTASSIUM SERPL-MCNC: 3.8 MMOL/L — SIGNIFICANT CHANGE UP (ref 3.5–5)
POTASSIUM SERPL-SCNC: 3.8 MMOL/L — SIGNIFICANT CHANGE UP (ref 3.5–5)
PROT SERPL-MCNC: 5.3 G/DL — LOW (ref 6–8)
RBC # BLD: 3.74 M/UL — LOW (ref 4.7–6.1)
RBC # FLD: 13.6 % — SIGNIFICANT CHANGE UP (ref 11.5–14.5)
SODIUM SERPL-SCNC: 136 MMOL/L — SIGNIFICANT CHANGE UP (ref 135–146)
WBC # BLD: 10.22 K/UL — SIGNIFICANT CHANGE UP (ref 4.8–10.8)
WBC # FLD AUTO: 10.22 K/UL — SIGNIFICANT CHANGE UP (ref 4.8–10.8)

## 2023-02-08 PROCEDURE — 99291 CRITICAL CARE FIRST HOUR: CPT

## 2023-02-08 RX ORDER — HEPARIN SODIUM 5000 [USP'U]/ML
5000 INJECTION INTRAVENOUS; SUBCUTANEOUS EVERY 12 HOURS
Refills: 0 | Status: DISCONTINUED | OUTPATIENT
Start: 2023-02-08 | End: 2023-02-09

## 2023-02-08 RX ORDER — SODIUM CHLORIDE 9 MG/ML
1000 INJECTION, SOLUTION INTRAVENOUS
Refills: 0 | Status: DISCONTINUED | OUTPATIENT
Start: 2023-02-08 | End: 2023-02-08

## 2023-02-08 RX ORDER — POTASSIUM CHLORIDE 20 MEQ
20 PACKET (EA) ORAL ONCE
Refills: 0 | Status: COMPLETED | OUTPATIENT
Start: 2023-02-08 | End: 2023-02-08

## 2023-02-08 RX ORDER — NOREPINEPHRINE BITARTRATE/D5W 8 MG/250ML
0.05 PLASTIC BAG, INJECTION (ML) INTRAVENOUS
Qty: 8 | Refills: 0 | Status: DISCONTINUED | OUTPATIENT
Start: 2023-02-08 | End: 2023-02-08

## 2023-02-08 RX ORDER — ATORVASTATIN CALCIUM 80 MG/1
80 TABLET, FILM COATED ORAL AT BEDTIME
Refills: 0 | Status: DISCONTINUED | OUTPATIENT
Start: 2023-02-08 | End: 2023-02-10

## 2023-02-08 RX ADMIN — Medication 325 MILLIGRAM(S): at 14:17

## 2023-02-08 RX ADMIN — Medication 20 MILLIEQUIVALENT(S): at 18:12

## 2023-02-08 RX ADMIN — HEPARIN SODIUM 5000 UNIT(S): 5000 INJECTION INTRAVENOUS; SUBCUTANEOUS at 18:12

## 2023-02-08 RX ADMIN — HEPARIN SODIUM 5000 UNIT(S): 5000 INJECTION INTRAVENOUS; SUBCUTANEOUS at 05:55

## 2023-02-08 RX ADMIN — CLOPIDOGREL BISULFATE 75 MILLIGRAM(S): 75 TABLET, FILM COATED ORAL at 14:17

## 2023-02-08 RX ADMIN — ATORVASTATIN CALCIUM 80 MILLIGRAM(S): 80 TABLET, FILM COATED ORAL at 21:42

## 2023-02-08 NOTE — PHYSICAL THERAPY INITIAL EVALUATION ADULT - ADDITIONAL COMMENTS
7 stairs to enter elevator building.
Pt resides in 6th floor apt using elevator to enter, no stairs to negotiate. Pt used to be independent with overall functional mobility, able to ambulate for community distances without AD.

## 2023-02-08 NOTE — CONSULT NOTE ADULT - SUBJECTIVE AND OBJECTIVE BOX
SUMMARY: The patient is an 87-year-old, Ethiopian-speaking male with no significant past medical history (but reports to have not seen a doctor in over 2 years) who presented with positional dizziness (when getting up from seated position) x 5 days. 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 The patient was examined at bedside and has no acute complaints. The patient denies current dizziness, headache, nausea, numbness/tingling, vision changes, and unusual fatigue.     OVERNIGHT EVENTS: POD #Left Carotid angioplasty stenting     SEDATION SCORES:  RASS: CAM-ICU:     REVIEW OF SYSTEMS:    VITALS: [] Reviewed    IMAGING/DATA: [] Reviewed    IVF FLUIDS/MEDICATIONS: [] Reviewed    ALLERGIES:  No Known Allergies    DEVICES:   [] Restraints [] PIVs [] ET tube [] central line [] PICC [] arterial line [] carrasco [] NGT/OGT [] EVD [] LD [] DAVID/HMV [] LiCOX [] ICP monitor [] Trach [] PEG [] Chest Tube [] other:    EXAMINATION:  General: No acute distress  HEENT: Anicteric sclerae  Cardiac: [id number]  Lungs: Clear  Abdomen: Soft, non-tender, +BS  Extremities: No c/c/e  Skin/Incision Site: Clean, dry and intact  Neurologic: AAOx3, no dysarthria or aphasia, recent and remote memory intact, appropriate fund of knowledge.   CN: PERRL, full visual fields, no nystagmus, EOMI, V1-V3 intact b/l and symmetric, face symmetric,  tongue midline.  Motor: 5/5 b/l UE/LE,  strength 5/5. Normal bulk and tone. No abnormal movements.   Sensation: Intact to light touch throughout.   Coordination: No dysmetria on finger-to-nose and heel-to-shin.   NIHSS: 0    ICU Vital Signs Last 24 Hrs  T(C): 35.9 (07 Feb 2023 23:00), Max: 36.9 (07 Feb 2023 16:47)  T(F): 96.6 (07 Feb 2023 23:00), Max: 98.5 (07 Feb 2023 16:47)  HR: 56 (08 Feb 2023 01:00) (53 - 99)  BP: 133/70 (07 Feb 2023 19:20) (101/51 - 150/72)  BP(mean): 104 (07 Feb 2023 16:26) (104 - 104)  ABP: 103/40 (08 Feb 2023 01:00) (103/40 - 141/88)  ABP(mean): 60 (08 Feb 2023 01:00) (60 - 90)  RR: 21 (08 Feb 2023 01:00) (16 - 28)  SpO2: 95% (08 Feb 2023 01:00) (95% - 99%)      02-06-23 @ 07:01  -  02-07-23 @ 07:00  --------------------------------------------------------  IN: 570 mL / OUT: 400 mL / NET: 170 mL    02-07-23 @ 07:01  -  02-08-23 @ 02:03  --------------------------------------------------------  IN: 350 mL / OUT: 300 mL / NET: 50 mL      aspirin enteric coated 325 milliGRAM(s) Oral daily  clopidogrel Tablet 75 milliGRAM(s) Oral daily  lactated ringers. 1000 milliLiter(s) (50 mL/Hr) IV Continuous <Continuous>  metoprolol tartrate 25 milliGRAM(s) Oral two times a day  ondansetron Injectable 4 milliGRAM(s) IV Push every 6 hours PRN  sacubitril 24 mG/valsartan 26 mG 1 Tablet(s) Oral two times a day      LABS:  Na: 141 (02-07 @ 06:01), 137 (02-06 @ 04:47), 138 (02-05 @ 05:42)  K: 4.3 (02-07 @ 06:01), 5.0 (02-06 @ 04:47), 3.6 (02-05 @ 05:42)  Cl: 111 (02-07 @ 06:01), 105 (02-06 @ 04:47), 108 (02-05 @ 05:42)  CO2: 22 (02-07 @ 06:01), 23 (02-06 @ 04:47), 17 (02-05 @ 05:42)  BUN: 16 (02-07 @ 06:01), 25 (02-06 @ 04:47), 28 (02-05 @ 05:42)  Cr: 0.9 (02-07 @ 06:01), 1.2 (02-06 @ 04:47), 1.2 (02-05 @ 05:42)  Glu: 113(02-07 @ 06:01), 118(02-06 @ 04:47), 113(02-05 @ 05:42)    Hgb: 13.4 (02-07 @ 06:01), 14.7 (02-06 @ 04:47), 15.3 (02-05 @ 05:42)  Hct: 39.6 (02-07 @ 06:01), 43.9 (02-06 @ 04:47), 44.2 (02-05 @ 05:42)  WBC: 9.42 (02-07 @ 06:01), 10.38 (02-06 @ 04:47), 9.36 (02-05 @ 05:42)  Plt: 204 (02-07 @ 06:01), 232 (02-06 @ 04:47), 237 (02-05 @ 05:42)    INR: 1.14 02-07-23 @ 06:01  PTT: 27.4 02-07-23 @ 06:01      LIVER FUNCTIONS - ( 07 Feb 2023 06:01 )  Alb: 3.4 g/dL / Pro: 5.6 g/dL / ALK PHOS: 42 U/L / ALT: 7 U/L / AST: 10 U/L / GGT: x          SUMMARY: The patient is an 87-year-old, Somali-speaking male with no significant past medical history (but reports to have not seen a doctor in over 2 years) who presented with positional dizziness (when getting up from seated position) x 5 days. 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 The patient was examined at bedside and has no acute complaints. The patient denies current dizziness, headache, nausea, numbness/tingling, vision changes, and unusual fatigue.     OVERNIGHT EVENTS: POD #Left Carotid angioplasty stenting     SEDATION SCORES:  RASS: CAM-ICU:     REVIEW OF SYSTEMS:    VITALS: [] Reviewed    IMAGING/DATA: [] Reviewed    IVF FLUIDS/MEDICATIONS: [] Reviewed    ALLERGIES:  No Known Allergies    DEVICES:   [] Restraints [] PIVs [] ET tube [] central line [] PICC [] arterial line [] carrasco [] NGT/OGT [] EVD [] LD [] DAVID/HMV [] LiCOX [] ICP monitor [] Trach [] PEG [] Chest Tube [] other:    EXAMINATION:  General: No acute distress  HEENT: Anicteric sclerae  Cardiac: [id number]  Lungs: Clear  Abdomen: Soft, non-tender, +BS  Extremities: No c/c/e  Skin/Incision Site: Clean, dry and intact  Neurologic: AAOx3, no dysarthria or aphasia,  CN: PERRL, no nystagmus, EOMI, V1-V3 intact b/l and symmetric, face symmetric,  tongue midline.  Motor: 5/5 b/l UE/LE,  strength 5/5. Normal bulk and tone. No abnormal movements.   Sensation: Intact to light touch throughout.   Coordination: No dysmetria on finger-to-nose and heel-to-shin.   NIHSS: 0    ICU Vital Signs Last 24 Hrs  T(C): 35.9 (07 Feb 2023 23:00), Max: 36.9 (07 Feb 2023 16:47)  T(F): 96.6 (07 Feb 2023 23:00), Max: 98.5 (07 Feb 2023 16:47)  HR: 56 (08 Feb 2023 01:00) (53 - 99)  BP: 133/70 (07 Feb 2023 19:20) (101/51 - 150/72)  BP(mean): 104 (07 Feb 2023 16:26) (104 - 104)  ABP: 103/40 (08 Feb 2023 01:00) (103/40 - 141/88)  ABP(mean): 60 (08 Feb 2023 01:00) (60 - 90)  RR: 21 (08 Feb 2023 01:00) (16 - 28)  SpO2: 95% (08 Feb 2023 01:00) (95% - 99%)      02-06-23 @ 07:01  -  02-07-23 @ 07:00  --------------------------------------------------------  IN: 570 mL / OUT: 400 mL / NET: 170 mL    02-07-23 @ 07:01  -  02-08-23 @ 02:03  --------------------------------------------------------  IN: 350 mL / OUT: 300 mL / NET: 50 mL      aspirin enteric coated 325 milliGRAM(s) Oral daily  clopidogrel Tablet 75 milliGRAM(s) Oral daily  lactated ringers. 1000 milliLiter(s) (50 mL/Hr) IV Continuous <Continuous>  metoprolol tartrate 25 milliGRAM(s) Oral two times a day  ondansetron Injectable 4 milliGRAM(s) IV Push every 6 hours PRN  sacubitril 24 mG/valsartan 26 mG 1 Tablet(s) Oral two times a day      LABS:  Na: 141 (02-07 @ 06:01), 137 (02-06 @ 04:47), 138 (02-05 @ 05:42)  K: 4.3 (02-07 @ 06:01), 5.0 (02-06 @ 04:47), 3.6 (02-05 @ 05:42)  Cl: 111 (02-07 @ 06:01), 105 (02-06 @ 04:47), 108 (02-05 @ 05:42)  CO2: 22 (02-07 @ 06:01), 23 (02-06 @ 04:47), 17 (02-05 @ 05:42)  BUN: 16 (02-07 @ 06:01), 25 (02-06 @ 04:47), 28 (02-05 @ 05:42)  Cr: 0.9 (02-07 @ 06:01), 1.2 (02-06 @ 04:47), 1.2 (02-05 @ 05:42)  Glu: 113(02-07 @ 06:01), 118(02-06 @ 04:47), 113(02-05 @ 05:42)    Hgb: 13.4 (02-07 @ 06:01), 14.7 (02-06 @ 04:47), 15.3 (02-05 @ 05:42)  Hct: 39.6 (02-07 @ 06:01), 43.9 (02-06 @ 04:47), 44.2 (02-05 @ 05:42)  WBC: 9.42 (02-07 @ 06:01), 10.38 (02-06 @ 04:47), 9.36 (02-05 @ 05:42)  Plt: 204 (02-07 @ 06:01), 232 (02-06 @ 04:47), 237 (02-05 @ 05:42)    INR: 1.14 02-07-23 @ 06:01  PTT: 27.4 02-07-23 @ 06:01      LIVER FUNCTIONS - ( 07 Feb 2023 06:01 )  Alb: 3.4 g/dL / Pro: 5.6 g/dL / ALK PHOS: 42 U/L / ALT: 7 U/L / AST: 10 U/L / GGT: x

## 2023-02-08 NOTE — PHYSICAL THERAPY INITIAL EVALUATION ADULT - DIAGNOSIS, PT EVAL
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
gait abnormality s/p  Left Carotid angioplasty stenting

## 2023-02-08 NOTE — CONSULT NOTE ADULT - ASSESSMENT
87 year old former smoker, Australian speaking male, no known significant past medical history who presented with positional dizziness x 5 days. Neurovascular team consulted and recommended MRI brain, which was negative for acute stroke but reporting chronic right parietal infarcts, CTA reporting 80% stenosis of the left ICA origin and 70-80% stenosis fo the right ICA. Patient admitted for further w/u with possible carotid artery angioplasty / stenting. 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  Significant 2-Vessel Coronary Artery Disease of the RCA and LAD  - Continue Metoprolol 25mg  BID with holding parameters <110  and Entresto 24/26 BID per cardiology recs holding parameters <110      GI:  - Dysphagia screen  - DASH Diet     :  - Monitor I & O    ID:  - Norovirus, diarrhea, new onset, resolving   - GI PCR positive for norovirus    Heme:   - DVT prophylaxis: heparin subcut  - SCD     Neuro ICU     Full Code

## 2023-02-08 NOTE — CONSULT NOTE ADULT - CRITICAL CARE ATTENDING COMMENT
87M admit s/p L carotid angioplasty and stenting, norovirus positive, sinus ness in setting of carotid manipulation   -q4 NC, q4 VC   -c/w asa & plavix, atorvastatin   -supportive care for norovirus, bowel movements decreasing  -500cc IVF bolus over 1 hr    Groin access site w/o hematoma     ICU stepdown Checklist:    Completed: 02-08 @ 19:01    [X] hemodynamically stable – VS WNL and stable, UO adequate  [n/a ] if  previously on HAD - off pressors x 24h with stable neuro exam    [X] no new symptoms x 24h (i.e. new fever, new-onset nausea/vomiting)  [X] stable labs: (i.e. WBC not rising, sodium not dropping)  [X] patient not at high risk for aspiration,  [n/a] low suctioning requirements (i.e. q4h or less)  [X] drains do not require ICU level of care  [X] if patient previously agitated or with behavioral issues – controlled   [X] pain controlled 87M admit s/p L carotid angioplasty and stenting, norovirus positive, sinus ness in setting of carotid manipulation   -q4 NC, q4 VC   -c/w asa & plavix, atorvastatin   -supportive care for norovirus, bowel movements decreasing  -500cc IVF bolus over 1 hr    Groin access site w/o hematoma

## 2023-02-08 NOTE — PROVIDER CONTACT NOTE (OTHER) - SITUATION
Pt HR as low as 43 while awake; pt asymptomatic denies any symptoms
a-line BP not correlating with BP cuff pressure; a-line waveform dampened

## 2023-02-08 NOTE — OCCUPATIONAL THERAPY INITIAL EVALUATION ADULT - PERTINENT HX OF CURRENT PROBLEM, REHAB EVAL
87-year-old, Sri Lankan-speaking male with no significant past medical history (but reports to have not seen a doctor in over 2 years) who presented with positional dizziness (when getting up from seated position) x 5 days. 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 The patient was examined at bedside and has no acute complaints. The patient denies current dizziness, headache, nausea, numbness/tingling, vision changes, and unusual fatigue.
87-year-old, Hungarian-speaking male with no significant past medical history (but reports to have not seen a doctor in over 2 years) who presented with positional dizziness (when getting up from seated position) x 5 days. 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 The patient was examined at bedside and has no acute complaints. The patient denies current dizziness, headache, nausea, numbness/tingling, vision changes, and unusual fatigue.  Patient I snow s/p neuro-IR for diagnostic cerebral angiogram with  endovascular carotid angioplasty / stenting.

## 2023-02-08 NOTE — PHYSICAL THERAPY INITIAL EVALUATION ADULT - GENERAL OBSERVATIONS, REHAB EVAL
Nepalese speaking. Utilised [employer]  #[id number]. PT IE 8:30-9am. Patient left in sitting in NAD. +call bell, +chair alarm, +telemetry.
14:00-14:40. chart reviewed. Pt received sitting B/S chair, alert, oriented, able to follow one-step instructions and agreeable to PT evaluation. Pt is [place] speaking  ID [id number]. + IV, + isolation, + A-line, + monitoring, VSS, denies pain or discomfort, slight R side incoordination, impulsive, with high risk of falling

## 2023-02-08 NOTE — PROVIDER CONTACT NOTE (OTHER) - ASSESSMENT
pt A&Ox4; denies dizziness, headache, blurry vision or any new symptoms. NIH remains 0
A&Ox4; NIH 0; no chest pain, dizziness, headache, blurry vision, or new additional symptoms

## 2023-02-08 NOTE — PROVIDER CONTACT NOTE (OTHER) - ACTION/TREATMENT ORDERED:
DENA Elliott at bedside to assess pt; a-line BP compared to cuff pressure. a-line BP fluctuating and waveform dampened; ok to go off cuff pressure

## 2023-02-08 NOTE — CONSULT NOTE ADULT - CONSULT REQUESTED DATE/TIME
30-Jan-2023 20:25
31-Jan-2023 21:19
31-Jan-2023 11:59
01-Feb-2023 14:52
08-Feb-2023 02:03
04-Feb-2023 13:45

## 2023-02-08 NOTE — OCCUPATIONAL THERAPY INITIAL EVALUATION ADULT - PLANNED THERAPY INTERVENTIONS, OT EVAL
ADL retraining/balance training/bed mobility training/neuromuscular re-education/parent/caregiver training.../ROM/strengthening/stretching/transfer training

## 2023-02-08 NOTE — PROGRESS NOTE ADULT - SUBJECTIVE AND OBJECTIVE BOX
Neuroendovascular Progress Note:     HPI:  The patient is an 87-year-old, Nicaraguan-speaking male with no significant past medical history (but reports to have not seen a doctor in over 2 years) who presented with positional dizziness (when getting up from seated position) x 5 days. 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 The patient was examined at bedside and has no acute complaints. The patient denies current dizziness, headache, nausea, numbness/tingling, vision changes, and unusual fatigue.   [id number] used for this encounter.   (31 Jan 2023 13:24)    Interval HPI: Patient is POD 1 s/p left carotid stenting and angioplasty with the neuroendovascular team. Patient is without complaints this AM. Patient's son on the phone available for translation, patient declined phone / video . Denies headache, dizziness, nausea, abd pain, leg pain, weakness, sensory change. Overnight experienced episodes of bradycardia into the 40s. Patient continues to be on DAPT - ASA and Plavix.     Past medical history:   Kidney stones  Urinary tract infection, E. coli    Medications:   aspirin enteric coated 325 milliGRAM(s) Oral daily  atorvastatin 80 milliGRAM(s) Oral at bedtime  clopidogrel Tablet 75 milliGRAM(s) Oral daily  heparin   Injectable 5000 Unit(s) SubCutaneous every 12 hours  metoprolol tartrate 25 milliGRAM(s) Oral two times a day  potassium chloride    Tablet ER 20 milliEquivalent(s) Oral once  sacubitril 24 mG/valsartan 26 mG 1 Tablet(s) Oral two times a day    Vitals:   T(F): 96.7 (02-08-23 @ 08:00), Max: 98.5 (02-07-23 @ 16:47)  HR: 55 (02-08-23 @ 08:00) (46 - 99)  BP: 111/55 (02-08-23 @ 08:00) (94/46 - 137/70)  RR: 32 (02-08-23 @ 08:00) (16 - 37)  SpO2: 98% (02-08-23 @ 08:00) (95% - 99%)    Labs:                         11.8   10.22 )-----------( 195      ( 08 Feb 2023 06:15 )             34.2   02-08  136  |  106  |  17  ----------------------------<  132<H>  3.8   |  22  |  0.8  Ca    8.1<L>      08 Feb 2023 06:15  TPro  5.3<L>  /  Alb  3.2<L>  /  TBili  0.8  /  DBili  x   /  AST  14  /  ALT  8   /  AlkPhos  40  02-08  PT/INR - ( 07 Feb 2023 06:01 )   PT: 13.10 sec;   INR: 1.14 ratio    PTT - ( 07 Feb 2023 06:01 )  PTT:27.4 sec  LIVER FUNCTIONS - ( 08 Feb 2023 06:15 )  Alb: 3.2 g/dL / Pro: 5.3 g/dL / ALK PHOS: 40 U/L / ALT: 8 U/L / AST: 14 U/L / GGT: x           Exam:   General - NAD   Neuro - AAO3, follows commands, EOMI, visual fields intact, moving all extremities without drift, sensation intact, no neglect, no dysmetria on finger to nose, no aphasia, no dysarthria   NIHSS 0   Extremity - right femoral arteriotomy site CDI without evidence of bleeding hematoma oozing ecchymosis or swelling. Soft and nontender to palpation. Temperature and color consistent bilateral lower extremities. DP pulses intact.     Radiology:   Imaging reviewed per neuroendovascular ACP/attending.     Official IR neuro report pending.     Assessment:   Patient is an 87 year old male POD 1 s/p left carotid angioplasty and stenting with the neuroendovascular team, now upgraded to neuro ICU, + norovirus diarrhea resolved, on DAPT. Overnight experiencing episodes of bradycardia.      Suggestions:  Patient appears stable from a post procedural perspective.  Continue DAPT - ASA 325mg QD and Plavix 75mg QD    Continue lipitor 80 mg QD  Continue care per neuroICU.   Will arrange outpatient follow up in ~2 weeks with Dr. López     Discussed with Dr. López   [phone] neuroendovascular

## 2023-02-08 NOTE — OCCUPATIONAL THERAPY INITIAL EVALUATION ADULT - GENERAL OBSERVATIONS, REHAB EVAL
Pt received semi acosta in bed in NAD, +tele, +BP cuff, +pulse oxi, + L wrist A line, agreeable to OT eval, left seated in b/s chair in [facility] RN aware, vitals stable, SBP within parameters

## 2023-02-08 NOTE — PHYSICAL THERAPY INITIAL EVALUATION ADULT - PERTINENT HX OF CURRENT PROBLEM, REHAB EVAL
87-year-old, Singaporean-speaking male with no significant past medical history (but reports to have not seen a doctor in over 2 years) who presented with positional dizziness (when getting up from seated position) x 5 days. 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 The patient was examined at bedside and has no acute complaints. The patient denies current dizziness, headache, nausea, numbness/tingling, vision changes

## 2023-02-08 NOTE — PHYSICAL THERAPY INITIAL EVALUATION ADULT - PLANNED THERAPY INTERVENTIONS, PT EVAL
No further therapy indicated. All functional tasks completed independently with safe sequencing.
balance training/gait training

## 2023-02-08 NOTE — OCCUPATIONAL THERAPY INITIAL EVALUATION ADULT - PATIENT PROFILE REVIEW, REHAB EVAL
Pt's chart reviewed./yes
[id number] Pt chart thoroughly reviewed prior to OT evaluation./yes
Pt's chart thoroughly reviewed/yes
Evaluation time: 09:16-09:54am; pt chart thoroughly reviewed prior to OT evaluation/yes

## 2023-02-08 NOTE — OCCUPATIONAL THERAPY INITIAL EVALUATION ADULT - ADL RETRAINING, OT EVAL
Patient will perform upper body dressing independently by discharge.; Patient will perform lower body dressing independently with use of appropriate adaptive equipment as needed by discharge.

## 2023-02-09 LAB
ALBUMIN SERPL ELPH-MCNC: 3.1 G/DL — LOW (ref 3.5–5.2)
ALP SERPL-CCNC: 41 U/L — SIGNIFICANT CHANGE UP (ref 30–115)
ALT FLD-CCNC: 9 U/L — SIGNIFICANT CHANGE UP (ref 0–41)
ANION GAP SERPL CALC-SCNC: 9 MMOL/L — SIGNIFICANT CHANGE UP (ref 7–14)
AST SERPL-CCNC: 13 U/L — SIGNIFICANT CHANGE UP (ref 0–41)
BILIRUB SERPL-MCNC: 0.6 MG/DL — SIGNIFICANT CHANGE UP (ref 0.2–1.2)
BUN SERPL-MCNC: 16 MG/DL — SIGNIFICANT CHANGE UP (ref 10–20)
CALCIUM SERPL-MCNC: 8.3 MG/DL — LOW (ref 8.4–10.5)
CHLORIDE SERPL-SCNC: 109 MMOL/L — SIGNIFICANT CHANGE UP (ref 98–110)
CO2 SERPL-SCNC: 21 MMOL/L — SIGNIFICANT CHANGE UP (ref 17–32)
CREAT SERPL-MCNC: 0.9 MG/DL — SIGNIFICANT CHANGE UP (ref 0.7–1.5)
EGFR: 84 ML/MIN/1.73M2 — SIGNIFICANT CHANGE UP
GLUCOSE SERPL-MCNC: 125 MG/DL — HIGH (ref 70–99)
HCT VFR BLD CALC: 33.3 % — LOW (ref 42–52)
HGB BLD-MCNC: 11.4 G/DL — LOW (ref 14–18)
MAGNESIUM SERPL-MCNC: 1.8 MG/DL — SIGNIFICANT CHANGE UP (ref 1.8–2.4)
MCHC RBC-ENTMCNC: 31.1 PG — HIGH (ref 27–31)
MCHC RBC-ENTMCNC: 34.2 G/DL — SIGNIFICANT CHANGE UP (ref 32–37)
MCV RBC AUTO: 91 FL — SIGNIFICANT CHANGE UP (ref 80–94)
NRBC # BLD: 0 /100 WBCS — SIGNIFICANT CHANGE UP (ref 0–0)
PHOSPHATE SERPL-MCNC: 2.4 MG/DL — SIGNIFICANT CHANGE UP (ref 2.1–4.9)
PLATELET # BLD AUTO: 219 K/UL — SIGNIFICANT CHANGE UP (ref 130–400)
POTASSIUM SERPL-MCNC: 4.3 MMOL/L — SIGNIFICANT CHANGE UP (ref 3.5–5)
POTASSIUM SERPL-SCNC: 4.3 MMOL/L — SIGNIFICANT CHANGE UP (ref 3.5–5)
PROT SERPL-MCNC: 5.2 G/DL — LOW (ref 6–8)
RBC # BLD: 3.66 M/UL — LOW (ref 4.7–6.1)
RBC # FLD: 13.9 % — SIGNIFICANT CHANGE UP (ref 11.5–14.5)
SODIUM SERPL-SCNC: 139 MMOL/L — SIGNIFICANT CHANGE UP (ref 135–146)
WBC # BLD: 10.47 K/UL — SIGNIFICANT CHANGE UP (ref 4.8–10.8)
WBC # FLD AUTO: 10.47 K/UL — SIGNIFICANT CHANGE UP (ref 4.8–10.8)

## 2023-02-09 PROCEDURE — 99232 SBSQ HOSP IP/OBS MODERATE 35: CPT

## 2023-02-09 RX ORDER — CHLORHEXIDINE GLUCONATE 213 G/1000ML
1 SOLUTION TOPICAL
Refills: 0 | Status: DISCONTINUED | OUTPATIENT
Start: 2023-02-09 | End: 2023-02-10

## 2023-02-09 RX ORDER — HEPARIN SODIUM 5000 [USP'U]/ML
5000 INJECTION INTRAVENOUS; SUBCUTANEOUS EVERY 8 HOURS
Refills: 0 | Status: DISCONTINUED | OUTPATIENT
Start: 2023-02-09 | End: 2023-02-10

## 2023-02-09 RX ORDER — TAMSULOSIN HYDROCHLORIDE 0.4 MG/1
0.4 CAPSULE ORAL AT BEDTIME
Refills: 0 | Status: DISCONTINUED | OUTPATIENT
Start: 2023-02-09 | End: 2023-02-10

## 2023-02-09 RX ORDER — MAGNESIUM SULFATE 500 MG/ML
2 VIAL (ML) INJECTION ONCE
Refills: 0 | Status: COMPLETED | OUTPATIENT
Start: 2023-02-09 | End: 2023-02-09

## 2023-02-09 RX ADMIN — Medication 25 GRAM(S): at 06:01

## 2023-02-09 RX ADMIN — HEPARIN SODIUM 5000 UNIT(S): 5000 INJECTION INTRAVENOUS; SUBCUTANEOUS at 21:57

## 2023-02-09 RX ADMIN — CHLORHEXIDINE GLUCONATE 1 APPLICATION(S): 213 SOLUTION TOPICAL at 05:18

## 2023-02-09 RX ADMIN — TAMSULOSIN HYDROCHLORIDE 0.4 MILLIGRAM(S): 0.4 CAPSULE ORAL at 21:57

## 2023-02-09 RX ADMIN — CLOPIDOGREL BISULFATE 75 MILLIGRAM(S): 75 TABLET, FILM COATED ORAL at 12:52

## 2023-02-09 RX ADMIN — HEPARIN SODIUM 5000 UNIT(S): 5000 INJECTION INTRAVENOUS; SUBCUTANEOUS at 05:18

## 2023-02-09 RX ADMIN — Medication 325 MILLIGRAM(S): at 12:52

## 2023-02-09 RX ADMIN — HEPARIN SODIUM 5000 UNIT(S): 5000 INJECTION INTRAVENOUS; SUBCUTANEOUS at 14:33

## 2023-02-09 RX ADMIN — ATORVASTATIN CALCIUM 80 MILLIGRAM(S): 80 TABLET, FILM COATED ORAL at 21:56

## 2023-02-09 NOTE — CHART NOTE - NSCHARTNOTESELECT_GEN_ALL_CORE
Neuroendovascular/Event Note
Neuroendovascular/Event Note
Transfer Note
Cardiology NP/Event Note
Cath report
Event Note
Event Note
NSICU
Neuroendovascular
P2Y12
post anesthesia

## 2023-02-09 NOTE — PROGRESS NOTE ADULT - CRITICAL CARE ATTENDING COMMENT
87M admit s/p L carotid angioplasty and stenting, norovirus positive, sinus ness in setting of carotid manipulation   -q4 NC, q4 VC   -c/w asa & plavix, atorvastatin   -supportive care for norovirus, bowel movements decreasing  -500cc IVF bolus over 1 hr    Groin access site w/o hematoma     ICU stepdown Checklist:    Completed: 02-08 @ 19:01    [X] hemodynamically stable – VS WNL and stable, UO adequate  [n/a ] if  previously on HAD - off pressors x 24h with stable neuro exam    [X] no new symptoms x 24h (i.e. new fever, new-onset nausea/vomiting)  [X] stable labs: (i.e. WBC not rising, sodium not dropping)  [X] patient not at high risk for aspiration,  [n/a] low suctioning requirements (i.e. q4h or less)  [X] drains do not require ICU level of care  [X] if patient previously agitated or with behavioral issues – controlled   [X] pain controlled 87M admit s/p L carotid angioplasty and stenting, norovirus positive, sinus ness in setting of carotid manipulation   -q4 NC, q4 VC   -c/w asa & plavix, atorvastatin   -supportive care for norovirus, diarreha resolved    ICU stepdown Checklist:    Completed: 02-09 @ 16:11    [X] hemodynamically stable – VS WNL and stable x 24hours, UO adequate  [n/a ] if  previously on HDA - off pressors x 24h with stable neuro exam    [X] no new symptoms x 24h (i.e. new fever, new-onset nausea/vomiting)  [X] stable labs: (i.e. WBC not rising, sodium not dropping)  [X] patient not at high risk for aspiration, if high risk then:                  [ ] should have definitive plans for trach/PEG (alternative option is to discharge from ICU to facilty)                  [ ] stepdown to bed close to nurse’s station  [n/a] low suctioning requirements (i.e. q4h or less)  [X] drains do not require ICU level of care  [X] if patient previously agitated or with behavioral issues – controlled   [X] pain controlled

## 2023-02-09 NOTE — CHART NOTE - NSCHARTNOTEFT_GEN_A_CORE
Neuro ICU Transfer Note    Transfer from: Neuro ICU  Transfer to:  (X) Med/surg    (  ) Telemetry    (  ) SDU       (  ) Stroke Unit    (  ) _______________  Accepting physician:  Sign out given to:    ICU COURSE: 88 y/o [place]adian-speaking male w/ no known PMHx (has not seen a doctor in 2 years) presented 1/30 for 5d Hx of positional dizziness. Found to have b/l ICA stenosis, was started on DAPT. Hospital course c/b newly diagnosed HF w/ LVEF 38%, s/p cardiac cath on 2/1 which identified 2 vessel CAD of RCA and LAD. Pt was started on Metoprolol and Entresto by Cardiology. Hospital course additionally c/b diarrhea, GI PCR (+) norovirus. Pt is now s/p L ICA stent placement on 2/7, was admitted to Neuro ICU for post-procedural monitoring. Metoprolol and Entresto held post-procedure due to decreased BP. Neuro exam has remained stable.          ASSESSMENT & PLAN: 87 year old former smoker, Barbadian speaking male, no known significant past medical history who presented with positional dizziness x 5 days. Neurovascular team consulted and recommended MRI brain, which was negative for acute stroke but reporting chronic right parietal infarcts, CTA reporting 80% stenosis of the left ICA origin and 70-80% stenosis fo the right ICA. Patient admitted for further w/u with possible carotid artery angioplasty / stenting. 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  Significant 2-Vessel Coronary Artery Disease of the RCA and LAD  - Continue Metoprolol 25mg  BID with holding parameters <110  and Entresto 24/26 BID per cardiology recs holding parameters <110      GI:  - Dysphagia screen  - DASH Diet     :  - Monitor I & O    ID:  - Norovirus, diarrhea, new onset, resolving   - GI PCR positive for norovirus    Heme:   - DVT prophylaxis: heparin subcut  - SCD         For Follow-Up:      PHYSICAL EXAM:    Vital Signs Last 24 Hrs  T(C): 36.6 (09 Feb 2023 04:00), Max: 37.3 (08 Feb 2023 18:00)  T(F): 97.9 (09 Feb 2023 04:00), Max: 99.2 (08 Feb 2023 18:00)  HR: 52 (09 Feb 2023 04:00) (48 - 70)  BP: 117/56 (09 Feb 2023 04:00) (95/51 - 134/86)  BP(mean): 80 (09 Feb 2023 04:00) (69 - 103)  RR: 24 (09 Feb 2023 04:00) (12 - 44)  SpO2: 97% (09 Feb 2023 04:00) (82% - 99%)    Parameters below as of 09 Feb 2023 04:00  Patient On (Oxygen Delivery Method): room air      I&O's Summary    07 Feb 2023 07:01  -  08 Feb 2023 07:00  --------------------------------------------------------  IN: 850 mL / OUT: 650 mL / NET: 200 mL    08 Feb 2023 07:01  -  09 Feb 2023 04:34  --------------------------------------------------------  IN: 0 mL / OUT: 200 mL / NET: -200 mL          MEDICATIONS  (STANDING):  aspirin enteric coated 325 milliGRAM(s) Oral daily  atorvastatin 80 milliGRAM(s) Oral at bedtime  chlorhexidine 2% Cloths 1 Application(s) Topical <User Schedule>  clopidogrel Tablet 75 milliGRAM(s) Oral daily  heparin   Injectable 5000 Unit(s) SubCutaneous every 12 hours    MEDICATIONS  (PRN):  ondansetron Injectable 4 milliGRAM(s) IV Push every 6 hours PRN Nausea and/or Vomiting        LABS                                            11.8                  Neurophils% (auto):   x      (02-08 @ 06:15):    10.22)-----------(195          Lymphocytes% (auto):  x                                             34.2                   Eosinphils% (auto):   x        Manual%: Neutrophils x    ; Lymphocytes x    ; Eosinophils x    ; Bands%: x    ; Blasts x                                    136    |  106    |  17                  Calcium: 8.1   / iCa: x      (02-08 @ 06:15)    ----------------------------<  132       Magnesium: x                                3.8     |  22     |  0.8              Phosphorous: x        TPro  5.3    /  Alb  3.2    /  TBili  0.8    /  DBili  x      /  AST  14     /  ALT  8      /  AlkPhos  40     08 Feb 2023 06:15 Neuro ICU Transfer Note    Transfer from: Neuro ICU  Transfer to:  (X) Med/surg    (  ) Telemetry    (  ) SDU       (  ) Stroke Unit    (  ) _______________  Accepting physician:  Sign out given to:    ICU COURSE: 86 y/o [place]ib[place]ian-speaking male w/ no known PMHx (has not seen a doctor in 2 years) presented 1/30 for 5d Hx of positional dizziness. Found to have b/l ICA stenosis, was started on DAPT. Hospital course c/b newly diagnosed HF w/ LVEF 38%, s/p cardiac cath on 2/1 which identified 2 vessel CAD of RCA and LAD. Pt was started on Metoprolol and Entresto by Cardiology. Hospital course additionally c/b diarrhea, GI PCR (+) norovirus. Pt is now s/p L ICA stent placement on 2/7, was admitted to Neuro ICU for post-procedural monitoring. Metoprolol and Entresto held post-procedure due to decreased BP. Neuro exam has remained stable.      Vital Signs Last 24 Hrs  T(C): 36.6 (09 Feb 2023 04:00), Max: 37.3 (08 Feb 2023 18:00)  T(F): 97.9 (09 Feb 2023 04:00), Max: 99.2 (08 Feb 2023 18:00)  HR: 52 (09 Feb 2023 04:00) (48 - 70)  BP: 117/56 (09 Feb 2023 04:00) (95/51 - 134/86)  BP(mean): 80 (09 Feb 2023 04:00) (69 - 103)  RR: 24 (09 Feb 2023 04:00) (12 - 44)  SpO2: 97% (09 Feb 2023 04:00) (82% - 99%)    Parameters below as of 09 Feb 2023 04:00  Patient On (Oxygen Delivery Method): room air      I&O's Summary    07 Feb 2023 07:01  -  08 Feb 2023 07:00  --------------------------------------------------------  IN: 850 mL / OUT: 650 mL / NET: 200 mL    08 Feb 2023 07:01  -  09 Feb 2023 04:34  --------------------------------------------------------  IN: 0 mL / OUT: 200 mL / NET: -200 mL          MEDICATIONS  (STANDING):  aspirin enteric coated 325 milliGRAM(s) Oral daily  atorvastatin 80 milliGRAM(s) Oral at bedtime  chlorhexidine 2% Cloths 1 Application(s) Topical <User Schedule>  clopidogrel Tablet 75 milliGRAM(s) Oral daily  heparin   Injectable 5000 Unit(s) SubCutaneous every 12 hours    MEDICATIONS  (PRN):  ondansetron Injectable 4 milliGRAM(s) IV Push every 6 hours PRN Nausea and/or Vomiting        LABS                                            11.8                  Neurophils% (auto):   x      (02-08 @ 06:15):    10.22)-----------(195          Lymphocytes% (auto):  x                                             34.2                   Eosinphils% (auto):   x        Manual%: Neutrophils x    ; Lymphocytes x    ; Eosinophils x    ; Bands%: x    ; Blasts x                                    136    |  106    |  17                  Calcium: 8.1   / iCa: x      (02-08 @ 06:15)    ----------------------------<  132       Magnesium: x                                3.8     |  22     |  0.8              Phosphorous: x        TPro  5.3    /  Alb  3.2    /  TBili  0.8    /  DBili  x      /  AST  14     /  ALT  8      /  AlkPhos  40     08 Feb 2023 06:15    ASSESSMENT & PLAN: 87 year old former smoker, [place]ian speaking male, no known significant past medical history who presented with positional dizziness x 5 days. Neurovascular team consulted and recommended MRI brain, which was negative for acute stroke but reporting chronic right parietal infarcts, CTA reporting 80% stenosis of the left ICA origin and 70-80% stenosis fo the right ICA. Patient admitted for further w/u with possible carotid artery angioplasty / stenting. 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  Significant 2-Vessel Coronary Artery Disease of the RCA and LAD  - Continue Metoprolol 25mg  BID with holding parameters <110  and Entresto 24/26 BID per cardiology recs holding parameters <110      GI:  - Dysphagia screen  - DASH Diet     :  - Monitor I & O    ID:  - Norovirus, diarrhea, new onset, resolving   - GI PCR positive for norovirus    Heme:   - DVT prophylaxis: heparin subcut  - SCD     For Follow-Up:  [ ] Clear Contact precautions 48 hours after diarrhea resolves   [ ] PT/OT   [ ] Continue DUAL AP     ex [id number]

## 2023-02-09 NOTE — PROGRESS NOTE ADULT - ASSESSMENT
87 year old former smoker, Beninese speaking male, no known significant past medical history who presented with positional dizziness x 5 days. Neurovascular team consulted and recommended MRI brain, which was negative for acute stroke but reporting chronic right parietal infarcts, CTA reporting 80% stenosis of the left ICA origin and 70-80% stenosis fo the right ICA. Patient admitted for further w/u with possible carotid artery angioplasty / stenting. 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  Significant 2-Vessel Coronary Artery Disease of the RCA and LAD  - Continue Metoprolol 25mg  BID with holding parameters <110  and Entresto 24/26 BID per cardiology recs holding parameters <110      GI:  - Dysphagia screen  - DASH Diet     :  - Monitor I & O    ID:  - Norovirus, diarrhea, new onset, resolving   - GI PCR positive for norovirus    Heme:   - DVT prophylaxis: heparin subcut  - SCD     Neuro ICU     Full Code  87 year old former smoker, [place] speaking male, no known significant past medical history who presented with positional dizziness x 5 days. Neurovascular team consulted and recommended MRI brain, which was negative for acute stroke but reporting chronic right parietal infarcts, CTA reporting 80% stenosis of the left ICA origin and 70-80% stenosis fo the right ICA. Patient admitted for further w/u with possible carotid artery angioplasty / stenting. 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  Significant 2-Vessel Coronary Artery Disease of the RCA and LAD  - Continue Metoprolol 25mg  BID with holding parameters <110  and Entresto 24/26 BID per cardiology recs holding parameters <110      GI:  - DASH Diet   - LBM 2/8: Formed    :  - Monitor I & O    ID:  - Norovirus, diarrhea, new onset, resolving   - GI PCR positive for norovirus    Heme:   - DVT prophylaxis: heparin subcut  - SCD     Neuro ICU     Full Code  87 year old former smoker, Estonian speaking male, no known significant past medical history who presented with positional dizziness x 5 days. Neurovascular team consulted and recommended MRI brain, which was negative for acute stroke but reporting chronic right parietal infarcts, CTA reporting 80% stenosis of the left ICA origin and 70-80% stenosis fo the right ICA. Patient admitted for further w/u with possible carotid artery angioplasty / stenting. 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  Significant 2-Vessel Coronary Artery Disease of the RCA and LAD  - Continue Metoprolol 25mg  BID with holding parameters <110  and Entresto 24/26 BID per cardiology recs holding parameters <110      GI:  - DASH Diet   - LBM 2/8: Formed    :  - Monitor I & O  - Hx of BPH; Flomax 0.4 at bedtime     ID:  - Norovirus, formed stool on 2/7  - GI PCR positive for norovirus  - Dcd contact precautions if no diarrhea for 48 hours     Heme:   - DVT prophylaxis: heparin subcut  - SCD     Downgrade to Tele     Full Code

## 2023-02-09 NOTE — PROGRESS NOTE ADULT - SUBJECTIVE AND OBJECTIVE BOX
SUMMARY: The patient is an 87-year-old, [place]ian-speaking male with no significant past medical history (but reports to have not seen a doctor in over 2 years) who presented with positional dizziness (when getting up from seated position) x 5 days. 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 The patient was examined at bedside and has no acute complaints. The patient denies current dizziness, headache, nausea, numbness/tingling, vision changes, and unusual fatigue.     OVERNIGHT EVENTS: POD #Left Carotid angioplasty stenting     REVIEW OF SYSTEMS: 10-point ROS is negative except as noted above    VITALS: [X] Reviewed    IMAGING/DATA: [X] Reviewed    IVF FLUIDS/MEDICATIONS: [X] Reviewed    ALLERGIES:  No Known Allergies    EXAMINATION:  General: No acute distress  HEENT: Anicteric sclerae  Cardiac: [id number]  Lungs: Clear  Abdomen: Soft, non-tender, +BS  Extremities: No c/c/e  Skin/Incision Site: Clean, dry and intact  Neurologic: AAOx3, no dysarthria or aphasia,  CN: PERRL, no nystagmus, EOMI, V1-V3 intact b/l and symmetric, face symmetric,  tongue midline.  Motor: 5/5 b/l UE/LE,  strength 5/5. Normal bulk and tone. No abnormal movements.   Sensation: Intact to light touch throughout.   Coordination: No dysmetria on finger-to-nose and heel-to-shin.   NIHSS: 0    ICU Vital Signs Last 24 Hrs  T(C): 35.9 (07 Feb 2023 23:00), Max: 36.9 (07 Feb 2023 16:47)  T(F): 96.6 (07 Feb 2023 23:00), Max: 98.5 (07 Feb 2023 16:47)  HR: 56 (08 Feb 2023 01:00) (53 - 99)  BP: 133/70 (07 Feb 2023 19:20) (101/51 - 150/72)  BP(mean): 104 (07 Feb 2023 16:26) (104 - 104)  ABP: 103/40 (08 Feb 2023 01:00) (103/40 - 141/88)  ABP(mean): 60 (08 Feb 2023 01:00) (60 - 90)  RR: 21 (08 Feb 2023 01:00) (16 - 28)  SpO2: 95% (08 Feb 2023 01:00) (95% - 99%)      02-06-23 @ 07:01  -  02-07-23 @ 07:00  --------------------------------------------------------  IN: 570 mL / OUT: 400 mL / NET: 170 mL    02-07-23 @ 07:01  -  02-08-23 @ 02:03  --------------------------------------------------------  IN: 350 mL / OUT: 300 mL / NET: 50 mL      aspirin enteric coated 325 milliGRAM(s) Oral daily  clopidogrel Tablet 75 milliGRAM(s) Oral daily  lactated ringers. 1000 milliLiter(s) (50 mL/Hr) IV Continuous <Continuous>  metoprolol tartrate 25 milliGRAM(s) Oral two times a day  ondansetron Injectable 4 milliGRAM(s) IV Push every 6 hours PRN  sacubitril 24 mG/valsartan 26 mG 1 Tablet(s) Oral two times a day      LABS:  Na: 141 (02-07 @ 06:01), 137 (02-06 @ 04:47), 138 (02-05 @ 05:42)  K: 4.3 (02-07 @ 06:01), 5.0 (02-06 @ 04:47), 3.6 (02-05 @ 05:42)  Cl: 111 (02-07 @ 06:01), 105 (02-06 @ 04:47), 108 (02-05 @ 05:42)  CO2: 22 (02-07 @ 06:01), 23 (02-06 @ 04:47), 17 (02-05 @ 05:42)  BUN: 16 (02-07 @ 06:01), 25 (02-06 @ 04:47), 28 (02-05 @ 05:42)  Cr: 0.9 (02-07 @ 06:01), 1.2 (02-06 @ 04:47), 1.2 (02-05 @ 05:42)  Glu: 113(02-07 @ 06:01), 118(02-06 @ 04:47), 113(02-05 @ 05:42)    Hgb: 13.4 (02-07 @ 06:01), 14.7 (02-06 @ 04:47), 15.3 (02-05 @ 05:42)  Hct: 39.6 (02-07 @ 06:01), 43.9 (02-06 @ 04:47), 44.2 (02-05 @ 05:42)  WBC: 9.42 (02-07 @ 06:01), 10.38 (02-06 @ 04:47), 9.36 (02-05 @ 05:42)  Plt: 204 (02-07 @ 06:01), 232 (02-06 @ 04:47), 237 (02-05 @ 05:42)    INR: 1.14 02-07-23 @ 06:01  PTT: 27.4 02-07-23 @ 06:01      LIVER FUNCTIONS - ( 07 Feb 2023 06:01 )  Alb: 3.4 g/dL / Pro: 5.6 g/dL / ALK PHOS: 42 U/L / ALT: 7 U/L / AST: 10 U/L / GGT: x          SUMMARY: The patient is an 87-year-old, Samoan-speaking male with no significant past medical history (but reports to have not seen a doctor in over 2 years) who presented with positional dizziness (when getting up from seated position) x 5 days. 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 The patient was examined at bedside and has no acute complaints. The patient denies current dizziness, headache, nausea, numbness/tingling, vision changes, and unusual fatigue.     OVERNIGHT EVENTS: POD #2 s/p Left ICA angioplasty stenting. No acute events ON    REVIEW OF SYSTEMS: 10-point ROS is negative except as noted above    VITALS: [X] Reviewed    IMAGING/DATA: [X] Reviewed    IVF FLUIDS/MEDICATIONS: [X] Reviewed    ALLERGIES:  No Known Allergies    EXAMINATION:  General: No acute distress  HEENT: Anicteric sclerae  Cardiac: [id number]  Lungs: Clear  Abdomen: Soft, non-tender, +BS  Extremities: No c/c/e  Skin/Incision Site: Clean, dry and intact  Neurologic: AAOx3, no dysarthria or aphasia,  CN: PERRL, no nystagmus, EOMI, V1-V3 intact b/l and symmetric, face symmetric,  tongue midline.  Motor: 5/5 b/l UE/LE,  strength 5/5. Normal bulk and tone. No abnormal movements.   Sensation: Intact to light touch throughout.   Coordination: No dysmetria on finger-to-nose and heel-to-shin.   NIHSS: 0            ICU Vital Signs Last 24 Hrs  T(C): 36.6 (09 Feb 2023 04:00), Max: 37.3 (08 Feb 2023 18:00)  T(F): 97.9 (09 Feb 2023 04:00), Max: 99.2 (08 Feb 2023 18:00)  HR: 57 (09 Feb 2023 05:00) (48 - 70)  BP: 101/53 (09 Feb 2023 05:00) (95/55 - 134/86)  BP(mean): 74 (09 Feb 2023 05:00) (71 - 103)  ABP: --  ABP(mean): --  RR: 18 (09 Feb 2023 05:00) (12 - 44)  SpO2: 92% (09 Feb 2023 05:00) (82% - 99%)      02-07-23 @ 07:01  -  02-08-23 @ 07:00  --------------------------------------------------------  IN: 850 mL / OUT: 650 mL / NET: 200 mL    02-08-23 @ 07:01  -  02-09-23 @ 05:51  --------------------------------------------------------  IN: 0 mL / OUT: 200 mL / NET: -200 mL            aspirin enteric coated 325 milliGRAM(s) Oral daily  atorvastatin 80 milliGRAM(s) Oral at bedtime  chlorhexidine 2% Cloths 1 Application(s) Topical <User Schedule>  clopidogrel Tablet 75 milliGRAM(s) Oral daily  heparin   Injectable 5000 Unit(s) SubCutaneous every 12 hours  magnesium sulfate  IVPB 2 Gram(s) IV Intermittent once  ondansetron Injectable 4 milliGRAM(s) IV Push every 6 hours PRN      LABS:  Na: 139 (02-09 @ 04:30), 136 (02-08 @ 06:15), 141 (02-07 @ 06:01)  K: 4.3 (02-09 @ 04:30), 3.8 (02-08 @ 06:15), 4.3 (02-07 @ 06:01)  Cl: 109 (02-09 @ 04:30), 106 (02-08 @ 06:15), 111 (02-07 @ 06:01)  CO2: 21 (02-09 @ 04:30), 22 (02-08 @ 06:15), 22 (02-07 @ 06:01)  BUN: 16 (02-09 @ 04:30), 17 (02-08 @ 06:15), 16 (02-07 @ 06:01)  Cr: 0.9 (02-09 @ 04:30), 0.8 (02-08 @ 06:15), 0.9 (02-07 @ 06:01)  Glu: 125(02-09 @ 04:30), 132(02-08 @ 06:15), 113(02-07 @ 06:01)    Hgb: 11.4 (02-09 @ 04:30), 11.8 (02-08 @ 06:15), 13.4 (02-07 @ 06:01)  Hct: 33.3 (02-09 @ 04:30), 34.2 (02-08 @ 06:15), 39.6 (02-07 @ 06:01)  WBC: 10.47 (02-09 @ 04:30), 10.22 (02-08 @ 06:15), 9.42 (02-07 @ 06:01)  Plt: 219 (02-09 @ 04:30), 195 (02-08 @ 06:15), 204 (02-07 @ 06:01)    INR: 1.14 02-07-23 @ 06:01  PTT: 27.4 02-07-23 @ 06:01          LIVER FUNCTIONS - ( 09 Feb 2023 04:30 )  Alb: 3.1 g/dL / Pro: 5.2 g/dL / ALK PHOS: 41 U/L / ALT: 9 U/L / AST: 13 U/L / GGT: x                SUMMARY: The patient is an 87-year-old, Chilean-speaking male with no significant past medical history (but reports to have not seen a doctor in over 2 years) who presented with positional dizziness (when getting up from seated position) x 5 days. 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 The patient was examined at bedside and has no acute complaints. The patient denies current dizziness, headache, nausea, numbness/tingling, vision changes, and unusual fatigue.     OVERNIGHT EVENTS: POD #3 s/p Left ICA angioplasty stenting. No acute events ON    REVIEW OF SYSTEMS: 10-point ROS is negative except as noted above    VITALS: [X] Reviewed    IMAGING/DATA: [X] Reviewed    IVF FLUIDS/MEDICATIONS: [X] Reviewed    ALLERGIES:  No Known Allergies    EXAMINATION:  General: No acute distress  HEENT: Anicteric sclerae  Cardiac: [id number]  Lungs: Clear  Abdomen: Soft, non-tender, +BS  Extremities: No c/c/e  Skin/Incision Site: Clean, dry and intact  Neurologic: AAOx3, no dysarthria or aphasia,  CN: PERRL, no nystagmus, EOMI, V1-V3 intact b/l and symmetric, face symmetric,  tongue midline.  Motor: 5/5 b/l UE/LE,  strength 5/5. Normal bulk and tone. No abnormal movements.   Sensation: Intact to light touch throughout.   Coordination: No dysmetria on finger-to-nose and heel-to-shin.   NIHSS: 0    ICU Vital Signs Last 24 Hrs  T(C): 36.6 (09 Feb 2023 04:00), Max: 37.3 (08 Feb 2023 18:00)  T(F): 97.9 (09 Feb 2023 04:00), Max: 99.2 (08 Feb 2023 18:00)  HR: 57 (09 Feb 2023 05:00) (48 - 70)  BP: 101/53 (09 Feb 2023 05:00) (95/55 - 134/86)  BP(mean): 74 (09 Feb 2023 05:00) (71 - 103)  ABP: --  ABP(mean): --  RR: 18 (09 Feb 2023 05:00) (12 - 44)  SpO2: 92% (09 Feb 2023 05:00) (82% - 99%)      02-07-23 @ 07:01  -  02-08-23 @ 07:00  --------------------------------------------------------  IN: 850 mL / OUT: 650 mL / NET: 200 mL    02-08-23 @ 07:01  -  02-09-23 @ 05:51  --------------------------------------------------------  IN: 0 mL / OUT: 200 mL / NET: -200 mL        aspirin enteric coated 325 milliGRAM(s) Oral daily  atorvastatin 80 milliGRAM(s) Oral at bedtime  chlorhexidine 2% Cloths 1 Application(s) Topical <User Schedule>  clopidogrel Tablet 75 milliGRAM(s) Oral daily  heparin   Injectable 5000 Unit(s) SubCutaneous every 12 hours  magnesium sulfate  IVPB 2 Gram(s) IV Intermittent once  ondansetron Injectable 4 milliGRAM(s) IV Push every 6 hours PRN      LABS:  Na: 139 (02-09 @ 04:30), 136 (02-08 @ 06:15), 141 (02-07 @ 06:01)  K: 4.3 (02-09 @ 04:30), 3.8 (02-08 @ 06:15), 4.3 (02-07 @ 06:01)  Cl: 109 (02-09 @ 04:30), 106 (02-08 @ 06:15), 111 (02-07 @ 06:01)  CO2: 21 (02-09 @ 04:30), 22 (02-08 @ 06:15), 22 (02-07 @ 06:01)  BUN: 16 (02-09 @ 04:30), 17 (02-08 @ 06:15), 16 (02-07 @ 06:01)  Cr: 0.9 (02-09 @ 04:30), 0.8 (02-08 @ 06:15), 0.9 (02-07 @ 06:01)  Glu: 125(02-09 @ 04:30), 132(02-08 @ 06:15), 113(02-07 @ 06:01)    Hgb: 11.4 (02-09 @ 04:30), 11.8 (02-08 @ 06:15), 13.4 (02-07 @ 06:01)  Hct: 33.3 (02-09 @ 04:30), 34.2 (02-08 @ 06:15), 39.6 (02-07 @ 06:01)  WBC: 10.47 (02-09 @ 04:30), 10.22 (02-08 @ 06:15), 9.42 (02-07 @ 06:01)  Plt: 219 (02-09 @ 04:30), 195 (02-08 @ 06:15), 204 (02-07 @ 06:01)    INR: 1.14 02-07-23 @ 06:01  PTT: 27.4 02-07-23 @ 06:01          LIVER FUNCTIONS - ( 09 Feb 2023 04:30 )  Alb: 3.1 g/dL / Pro: 5.2 g/dL / ALK PHOS: 41 U/L / ALT: 9 U/L / AST: 13 U/L / GGT: x

## 2023-02-10 ENCOUNTER — TRANSCRIPTION ENCOUNTER (OUTPATIENT)
Age: 88
End: 2023-02-10

## 2023-02-10 VITALS — DIASTOLIC BLOOD PRESSURE: 58 MMHG | HEART RATE: 59 BPM | SYSTOLIC BLOOD PRESSURE: 125 MMHG | OXYGEN SATURATION: 95 %

## 2023-02-10 LAB
ALBUMIN SERPL ELPH-MCNC: 3.1 G/DL — LOW (ref 3.5–5.2)
ALP SERPL-CCNC: 43 U/L — SIGNIFICANT CHANGE UP (ref 30–115)
ALT FLD-CCNC: 10 U/L — SIGNIFICANT CHANGE UP (ref 0–41)
ANION GAP SERPL CALC-SCNC: 8 MMOL/L — SIGNIFICANT CHANGE UP (ref 7–14)
AST SERPL-CCNC: 14 U/L — SIGNIFICANT CHANGE UP (ref 0–41)
BILIRUB SERPL-MCNC: 0.5 MG/DL — SIGNIFICANT CHANGE UP (ref 0.2–1.2)
BUN SERPL-MCNC: 20 MG/DL — SIGNIFICANT CHANGE UP (ref 10–20)
CALCIUM SERPL-MCNC: 8 MG/DL — LOW (ref 8.4–10.4)
CHLORIDE SERPL-SCNC: 104 MMOL/L — SIGNIFICANT CHANGE UP (ref 98–110)
CO2 SERPL-SCNC: 25 MMOL/L — SIGNIFICANT CHANGE UP (ref 17–32)
CREAT SERPL-MCNC: 0.9 MG/DL — SIGNIFICANT CHANGE UP (ref 0.7–1.5)
EGFR: 84 ML/MIN/1.73M2 — SIGNIFICANT CHANGE UP
GLUCOSE SERPL-MCNC: 134 MG/DL — HIGH (ref 70–99)
HCT VFR BLD CALC: 32.3 % — LOW (ref 42–52)
HGB BLD-MCNC: 10.9 G/DL — LOW (ref 14–18)
LACTOFERRIN STL-MCNC: 3.05 CD:794062635 — SIGNIFICANT CHANGE UP (ref 0–7.24)
MAGNESIUM SERPL-MCNC: 1.9 MG/DL — SIGNIFICANT CHANGE UP (ref 1.8–2.4)
MCHC RBC-ENTMCNC: 31.1 PG — HIGH (ref 27–31)
MCHC RBC-ENTMCNC: 33.7 G/DL — SIGNIFICANT CHANGE UP (ref 32–37)
MCV RBC AUTO: 92 FL — SIGNIFICANT CHANGE UP (ref 80–94)
NRBC # BLD: 0 /100 WBCS — SIGNIFICANT CHANGE UP (ref 0–0)
PHOSPHATE SERPL-MCNC: 2.7 MG/DL — SIGNIFICANT CHANGE UP (ref 2.1–4.9)
PLATELET # BLD AUTO: 217 K/UL — SIGNIFICANT CHANGE UP (ref 130–400)
POTASSIUM SERPL-MCNC: 4.3 MMOL/L — SIGNIFICANT CHANGE UP (ref 3.5–5)
POTASSIUM SERPL-SCNC: 4.3 MMOL/L — SIGNIFICANT CHANGE UP (ref 3.5–5)
PROT SERPL-MCNC: 5.4 G/DL — LOW (ref 6–8)
RBC # BLD: 3.51 M/UL — LOW (ref 4.7–6.1)
RBC # FLD: 14.1 % — SIGNIFICANT CHANGE UP (ref 11.5–14.5)
SODIUM SERPL-SCNC: 137 MMOL/L — SIGNIFICANT CHANGE UP (ref 135–146)
WBC # BLD: 10.48 K/UL — SIGNIFICANT CHANGE UP (ref 4.8–10.8)
WBC # FLD AUTO: 10.48 K/UL — SIGNIFICANT CHANGE UP (ref 4.8–10.8)

## 2023-02-10 PROCEDURE — 99232 SBSQ HOSP IP/OBS MODERATE 35: CPT

## 2023-02-10 RX ORDER — TAMSULOSIN HYDROCHLORIDE 0.4 MG/1
1 CAPSULE ORAL
Qty: 0 | Refills: 0 | DISCHARGE
Start: 2023-02-10

## 2023-02-10 RX ORDER — ATORVASTATIN CALCIUM 80 MG/1
1 TABLET, FILM COATED ORAL
Qty: 30 | Refills: 0
Start: 2023-02-10 | End: 2023-03-11

## 2023-02-10 RX ORDER — MAGNESIUM SULFATE 500 MG/ML
1 VIAL (ML) INJECTION ONCE
Refills: 0 | Status: COMPLETED | OUTPATIENT
Start: 2023-02-10 | End: 2023-02-10

## 2023-02-10 RX ADMIN — Medication 325 MILLIGRAM(S): at 12:41

## 2023-02-10 RX ADMIN — CHLORHEXIDINE GLUCONATE 1 APPLICATION(S): 213 SOLUTION TOPICAL at 05:14

## 2023-02-10 RX ADMIN — HEPARIN SODIUM 5000 UNIT(S): 5000 INJECTION INTRAVENOUS; SUBCUTANEOUS at 05:14

## 2023-02-10 RX ADMIN — CLOPIDOGREL BISULFATE 75 MILLIGRAM(S): 75 TABLET, FILM COATED ORAL at 12:41

## 2023-02-10 RX ADMIN — Medication 100 GRAM(S): at 08:19

## 2023-02-10 NOTE — DIETITIAN INITIAL EVALUATION ADULT - COLLABORATION WITH OTHER PROVIDERS
Interventions: coordination of care  Monitoring/Evaluation: diet order, energy intake, weight, labs, skin status, NFPF

## 2023-02-10 NOTE — PROGRESS NOTE ADULT - SUBJECTIVE AND OBJECTIVE BOX
Neuroendovascular Progress Note:     HPI:  The patient is an 87-year-old, Belgian-speaking male with no significant past medical history (but reports to have not seen a doctor in over 2 years) who presented with positional dizziness (when getting up from seated position) x 5 days. 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 The patient was examined at bedside and has no acute complaints. The patient denies current dizziness, headache, nausea, numbness/tingling, vision changes, and unusual fatigue.   [id number] used for this encounter.   (31 Jan 2023 13:24)    Interval HPI: Patient is POD 3 s/p left carotid angioplasty /stenting with the neuroendovascular team. No acute complaints this AM, no overnight events.     Past medical history:   Kidney stones  Urinary tract infection, E. coli    Medications:   aspirin enteric coated 325 milliGRAM(s) Oral daily  atorvastatin 80 milliGRAM(s) Oral at bedtime  chlorhexidine 2% Cloths 1 Application(s) Topical <User Schedule>  clopidogrel Tablet 75 milliGRAM(s) Oral daily  heparin   Injectable 5000 Unit(s) SubCutaneous every 8 hours  tamsulosin 0.4 milliGRAM(s) Oral at bedtime    Vitals:   T(F): 98.3 (02-10-23 @ 04:00), Max: 98.3 (02-10-23 @ 04:00)  HR: 72 (02-10-23 @ 10:00) (55 - 94)  BP: 138/72 (02-10-23 @ 09:00) (106/58 - 138/72)  RR: 20 (02-10-23 @ 07:00) (8 - 57)  SpO2: 95% (02-10-23 @ 10:00) (93% - 97%)    Labs:                         10.9   10.48 )-----------( 217      ( 10 Feb 2023 05:00 )             32.3     02-10    137  |  104  |  20  ----------------------------<  134<H>  4.3   |  25  |  0.9    Ca    8.0<L>      10 Feb 2023 05:00  Phos  2.7     02-10  Mg     1.9     02-10  TPro  5.4<L>  /  Alb  3.1<L>  /  TBili  0.5  /  DBili  x   /  AST  14  /  ALT  10  /  AlkPhos  43  02-10  LIVER FUNCTIONS - ( 10 Feb 2023 05:00 )  Alb: 3.1 g/dL / Pro: 5.4 g/dL / ALK PHOS: 43 U/L / ALT: 10 U/L / AST: 14 U/L / GGT: x           Exam:   General - NAD   Neuro Exam- AAO3, follows commands, EOMI, VFF, face symmetric, moving all extremities to antigravity, sensation intact, no neglect, no dysmetria on finger to nose, no dysarthria no aphasia   Extremity - right femoral arteriotomy site CDI without evidence of bleeding hematoma or infection. Distal pulses intact to palpation, temperature and color consistent bilaterally.     Radiology:   Imaging reviewed per neuroendovascular ACP/attending.   Official IR neuro report pending.     Assessment:   87 year old male, no known significant past medical history, presented with positional dizziness 5 days, found with severe ICA stenosis, now s/p left carotid angioplasty/stenting with the neuroendovascular team.     Suggestions:  Patient is stable from post procedural perspective.   Must continue aspirin and plavix for DAPT coverage s/p stenting.   Continue management per neuro ICU / medicine - patient currently pending downgrade to medicine service per ICU discussion.   Will follow patient within 1-2 weeks in clinic for post op check.     Discussed w Dr. López   [id number] neuroendovascular

## 2023-02-10 NOTE — DISCHARGE NOTE PROVIDER - HOSPITAL COURSE
HPI:  The patient is an 87-year-old, [place]-speaking male with no significant past medical history (but reports to have not seen a doctor in over 2 years) who presented with positional dizziness (when getting up from seated position) x 5 days. 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 The patient was examined at bedside and has no acute complaints. The patient denies current dizziness, headache, nausea, numbness/tingling, vision changes, and unusual fatigue.   [id number] used for this encounter.   (31 Jan 2023 13:24)      Hospital Course  Patient admitted to NCCU, Found to have b/l ICA stenosis, was started on DAPT. Hospital course c/b newly diagnosed HF w/ LVEF 38%, s/p cardiac cath on 2/1 which identified 2 vessel CAD of RCA and LAD. Pt was started on Metoprolol and Entresto by Cardiology. Hospital course additionally c/b diarrhea, GI PCR (+) norovirus. Pt is now s/p L ICA stent placement on 2/7, was admitted to Neuro ICU for post-procedural monitoring. Metoprolol and Entresto held post-procedure due to decreased BP and bradycardia. Diarrhea resolved, Neuro exam has remained stable.  Stable and safe for d/c w/ NSx and Cardio f/u

## 2023-02-10 NOTE — DIETITIAN INITIAL EVALUATION ADULT - ORAL INTAKE PTA/DIET HISTORY
Patient is Estonian speaking  utilized to obtain nutrition information (#[id number]). Patient reports good appetite and PO intake PTA. Followed no particular diet. Patient with no chewing difficulties as long as he had his dentures. No vitamin/mineral supplements taken, but patient drinks vegetable and fruit juices. Patient reports UBW more recently is 72 - 73 kg. NKFA, intolerances.     During Current admission, patient reports good appetite and PO intake (>75% of meals) - RN also confirmed - Patient also eats food that wife brings as well.

## 2023-02-10 NOTE — DISCHARGE NOTE PROVIDER - PROVIDER TOKENS
PROVIDER:[TOKEN:[[id number]],FOLLOWUP:[1 week]],PROVIDER:[TOKEN:[[id number]],FOLLOWUP:[1 week]],PROVIDER:[TOKEN:[[id number]],FOLLOWUP:[2 weeks]],PROVIDER:[TOKEN:[[id number]],FOLLOWUP:[1 month]]

## 2023-02-10 NOTE — DIETITIAN INITIAL EVALUATION ADULT - PERTINENT MEDS FT
MEDICATIONS  (STANDING):  aspirin enteric coated 325 milliGRAM(s) Oral daily  atorvastatin 80 milliGRAM(s) Oral at bedtime  chlorhexidine 2% Cloths 1 Application(s) Topical <User Schedule>  clopidogrel Tablet 75 milliGRAM(s) Oral daily  heparin   Injectable 5000 Unit(s) SubCutaneous every 8 hours  tamsulosin 0.4 milliGRAM(s) Oral at bedtime    MEDICATIONS  (PRN):  ondansetron Injectable 4 milliGRAM(s) IV Push every 6 hours PRN Nausea and/or Vomiting

## 2023-02-10 NOTE — PROGRESS NOTE ADULT - ASSESSMENT
87 year old former smoker, Citizen of [place] speaking male, no known significant past medical history who presented with positional dizziness x 5 days. Neurovascular team consulted and recommended MRI brain, which was negative for acute stroke but reporting chronic right parietal infarcts, CTA reporting 80% stenosis of the left ICA origin and 70-80% stenosis fo the right ICA. Patient admitted for further w/u with possible carotid artery angioplasty / stenting. 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  Significant 2-Vessel Coronary Artery Disease of the RCA and LAD  - Continue Metoprolol 25mg  BID with holding parameters <110  and Entresto 24/26 BID per cardiology recs holding parameters <110      GI:  - DASH Diet   - LBM 2/8: Formed    :  - Monitor I & O  - Hx of BPH; Flomax 0.4 at bedtime     ID:  - Norovirus, formed stool on 2/7  - GI PCR positive for norovirus  - Dcd contact precautions if no diarrhea for 48 hours     Heme:   - DVT prophylaxis: heparin subcut  - SCD     Downgrade to Tele     Full Code  87 year old former smoker, [place]ib[place]ian speaking male, no known significant past medical history who presented with positional dizziness x 5 days. Neurovascular team consulted and recommended MRI brain, which was negative for acute stroke but reporting chronic right parietal infarcts, CTA reporting 80% stenosis of the left ICA origin and 70-80% stenosis fo the right ICA. Patient admitted for further w/u with possible carotid artery angioplasty / stenting. 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  Significant 2-Vessel Coronary Artery Disease of the RCA and LAD  - Held Metoprolol 25mg BID and Entresto 24/26 BID per cardiology recs until downgrade and cariology F/U    GI:  - DASH Diet   - LBM 2/8: Formed    :  - Monitor I & O  - Hx of BPH; Flomax 0.4 at bedtime     ID:  - Norovirus, formed stool on 2/7  - GI PCR positive for norovirus  - D/C contact precautions   - afebrile     Heme:   - DVT prophylaxis: heparin subcut  - SCD     Downgrade to any medical bed under medicine with neuroendovascular and cardiology c/s     Full Code  87 year old former smoker, Jamaican speaking male, no known significant past medical history who presented with positional dizziness x 5 days. Neurovascular team consulted and recommended MRI brain, which was negative for acute stroke but reporting chronic right parietal infarcts, CTA reporting 80% stenosis of the left ICA origin and 70-80% stenosis fo the right ICA. Patient admitted for further w/u with possible carotid artery angioplasty / stenting. 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  Significant 2-Vessel Coronary Artery Disease of the RCA and LAD  - Held Metoprolol 25mg BID and Entresto 24/26 BID per cardiology recs until downgrade and cariology F/U    GI:  - DASH Diet   - LBM 2/8: Formed    :  - Monitor I & O  - Hx of BPH; Flomax 0.4 at bedtime     ID:  - Norovirus, formed stool on 2/7  - GI PCR positive for norovirus  - D/C contact precautions   - afebrile     Heme:   - DVT prophylaxis: heparin subcut  - SCD     d/c home today     Full Code

## 2023-02-10 NOTE — DISCHARGE NOTE NURSING/CASE MANAGEMENT/SOCIAL WORK - PATIENT PORTAL LINK FT
You can access the FollowMyHealth Patient Portal offered by [facility] by registering at the following website: [email or web address]. By joining [facility]’s FollowMyHealth portal, you will also be able to view your health information using other applications (apps) compatible with our system.

## 2023-02-10 NOTE — DISCHARGE NOTE PROVIDER - NSDCCPCAREPLAN_GEN_ALL_CORE_FT
PRINCIPAL DISCHARGE DIAGNOSIS  Diagnosis: Left carotid artery stenosis  Assessment and Plan of Treatment: Carotid artery disease, also called carotid artery stenosis, is the narrowing or blockage of one or both carotid arteries. The carotid arteries are the two main blood vessels on either side of the neck. They supply blood to the brain, other parts of the head, and the neck.  Carotid artery disease increases your risk for a stroke or a transient ischemic attack (TIA). A TIA is a "mini-stroke" that causes stroke-like symptoms that then go away quickly.  Get help right away if:  •You have any symptoms of a stroke. "BE FAST" is an easy way to remember the main warning signs of a stroke:  •B - Balance. Signs are dizziness, sudden trouble walking, or loss of balance.  •E - Eyes. Signs are trouble seeing or a sudden change in vision.  •F - Face. Signs are sudden weakness or numbness of the face, or the face or eyelid drooping on one side.  •A - Arms. Signs are weakness or numbness in an arm. This happens suddenly and usually on one side of the body.  •S - Speech. Signs are sudden trouble speaking, slurred speech, or trouble understanding what people say.  •T - Time. Time to call emergency services. Write down what time symptoms started.  •You have other signs of a stroke, such as:  •A sudden, severe headache with no known cause.  •Nausea or vomiting.  •Seizures  You were found to have a carotid artery stenosis which is likely the culprit of your symptoms. Neuroendovascular team placed a stent in this narrow artery supplying blood to your brain to keep it open and to allow blood to flow well. We continued youe aspirin and plavix which are very important drugs that you must take daily without missing any doses. 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 This was diagnosed through an ultrasound of your heart and a cardiac cath. You have triple vessel disease meaning you have significant narrowing in 3 arteries in your heart. Because of this your heart is not able to pump to its maximum potential. You must continue taking aspirin and plavix every day as well as Lipitor to decrease your cholesterol. Additionally we started you on 2 medications for heart failure to decrease the work required from your heart, these include a medication to decrease your heart rate and contractility called Metoprolol. And another called Entresto which works by lowing your blood pressure and decreasing your blood volume (via urine loss) and in turn decreasing the work required by the heart. Because your heart rate was low and your blood pressure was low during your stay we held these medications. Please follow up with cardiology outpatient to restart these medications once you leave the hospital. Please take all medications as advised by your cardiologist daily

## 2023-02-10 NOTE — PROGRESS NOTE ADULT - PROVIDER SPECIALTY LIST ADULT
Hospitalist
Hospitalist
Neurology
Neurosurgery
Neurosurgery
Hospitalist
Neurology
Neurosurgery
NSICU
NSICU

## 2023-02-10 NOTE — DIETITIAN INITIAL EVALUATION ADULT - ADD RECOMMEND
1) Continue current diet order  2) Continue to monitor BM    Patient is at low nutrition risk, RD to f/u 7-10 days or PRN

## 2023-02-10 NOTE — DIETITIAN INITIAL EVALUATION ADULT - OTHER INFO
Patient is an 88 yo Hungarian speaking male. He is a former smoker. He has no known significant past medical hx. Presented with positional dizziness x 5 days. Neurovascular team consulted and recommended MRI brain which was negative for acute stroke but reporting chronic right parietal infarcts, CTA reporting 80% stenosis of the left ICA origin and 70-80% stenosis fo the right ICA. Patient admitted for further w/u with possible carotid artery angioplasty / stenting. 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  Significant 2-Vessel Coronary Artery Disease of the RCA and LAD  - Held Metoprolol 25mg BID and Entresto 24/26 BID per cardiology recs until downgrade and cariology F/U

## 2023-02-10 NOTE — DISCHARGE NOTE PROVIDER - NSDCFUADDAPPT_GEN_ALL_CORE_FT
You were noted to have multiple pulmonary nodules which you should see a pulmonologist about for surveillance and further management, we have added a referral to a pulmonologist in the discharge instructions

## 2023-02-10 NOTE — DISCHARGE NOTE PROVIDER - CARE PROVIDERS DIRECT ADDRESSES
,[email or web address],DirectAddress_Unknown,DirectAddress_Unknown,DirectAddress_Unknown

## 2023-02-10 NOTE — DIETITIAN INITIAL EVALUATION ADULT - OTHER CALCULATIONS
1667 - 1806 kcal/day (MSJ x 1.2 - 1.3 SF)   estimated needs with consideration for age, critical care setting

## 2023-02-10 NOTE — DIETITIAN INITIAL EVALUATION ADULT - PERTINENT LABORATORY DATA
02-10    137  |  104  |  20  ----------------------------<  134<H>  4.3   |  25  |  0.9    Ca    8.0<L>      10 Feb 2023 05:00  Phos  2.7     02-10  Mg     1.9     02-10    TPro  5.4<L>  /  Alb  3.1<L>  /  TBili  0.5  /  DBili  x   /  AST  14  /  ALT  10  /  AlkPhos  43  02-10

## 2023-02-10 NOTE — DISCHARGE NOTE PROVIDER - CARE PROVIDER_API CALL
Mayito Alston)  Cardiovascular Disease; Internal Medicine  [address] [place] [address]  Phone: [phone]  Fax: [phone]  Follow Up Time: 1 week    Kedar López; [facility])  Surgery  Neurosurgery  [address] [place] [address]  Phone: [phone]  Fax: [phone]  Follow Up Time: 1 week    JES ANDERSON  Internal Medicine  Phone: [phone]  Fax: [phone])-  Follow Up Time: 2 weeks    Sam Johns)  Critical Care Medicine; Internal Medicine; Pulmonary Disease; Sleep Medicine  [address] [place] [address]  Phone: [phone]  Fax: [phone]  Follow Up Time: 1 month

## 2023-02-10 NOTE — DISCHARGE NOTE NURSING/CASE MANAGEMENT/SOCIAL WORK - NSDCVIVACCINE_GEN_ALL_CORE_FT
Tdap; 09-Jul-2018 02:04; Tiara Haney (RN); [employer]; [id number]; IntraMuscular; Deltoid Left.; 0.5 milliLiter(s); VIS (VIS Published: 09-May-2013, VIS Presented: 09-Jul-2018);

## 2023-02-10 NOTE — PROGRESS NOTE ADULT - NS ATTEST RISK PROBLEM GEN_ALL_CORE FT
Rounding, chart review, physical examination
87M admit s/p L carotid angioplasty and stenting, norovirus positive, sinus ness in setting of carotid manipulation now resolved   -q4 NC, q4 VC   -c/w asa & Plavix, atorvastatin   -supportive care for norovirus, diarrhea resolved

## 2023-02-10 NOTE — DISCHARGE NOTE PROVIDER - NSDCMRMEDTOKEN_GEN_ALL_CORE_FT
aspirin 81 mg oral tablet: 81 milligram(s) orally once a day  atorvastatin 80 mg oral tablet: 1 tab(s) orally once a day (at bedtime)  Flomax 0.4 mg oral capsule: 1 cap(s) orally once a day (at bedtime)  Plavix 75 mg oral tablet: 1 tab(s) orally once a day

## 2023-02-10 NOTE — DISCHARGE NOTE PROVIDER - NSDCFUADDINST_GEN_ALL_CORE_FT
You were found to have heart failure due to a weak heart muscle as a result o blockages in your coronary arteries. This was diagnosed through an ultrasound of your heart and a cardiac cath. You have triple vessel disease meaning you have significant narrowing in 3 arteries in your heart. Because of this your heart is not able to pump to its maximum potential. You must continue taking aspirin and plavix every day as well as Lipitor to decrease your cholesterol. Additionally we started you on 2 medications for heart failure to decrease the work required from your heart, these include a medication to decrease your heart rate and contractility called Metoprolol. And another called Entresto which works by lowing your blood pressure and decreasing your blood volume (via urine loss) and in turn decreasing the work required by the heart. Because your heart rate was low and your blood pressure was low during your stay we held these medications. Please follow up with cardiology outpatient to restart these medications once you leave the hospital. Please take all medications as advised by your cardiologist daily

## 2023-02-13 LAB — CALPROTECTIN STL-MCNT: <16 UG/G — SIGNIFICANT CHANGE UP (ref 0–120)

## 2023-03-21 ENCOUNTER — OUTPATIENT (OUTPATIENT)
Dept: OUTPATIENT SERVICES | Facility: HOSPITAL | Age: 88
LOS: 1 days | End: 2023-03-21
Payer: MEDICARE

## 2023-03-21 ENCOUNTER — NON-APPOINTMENT (OUTPATIENT)
Age: 88
End: 2023-03-21

## 2023-03-21 DIAGNOSIS — R22.9 LOCALIZED SWELLING, MASS AND LUMP, UNSPECIFIED: ICD-10-CM

## 2023-03-21 DIAGNOSIS — Z98.890 OTHER SPECIFIED POSTPROCEDURAL STATES: Chronic | ICD-10-CM

## 2023-03-21 PROCEDURE — 93880 EXTRACRANIAL BILAT STUDY: CPT | Mod: 26

## 2023-03-21 PROCEDURE — 93880 EXTRACRANIAL BILAT STUDY: CPT

## 2023-03-22 ENCOUNTER — APPOINTMENT (OUTPATIENT)
Dept: NEUROSURGERY | Facility: CLINIC | Age: 88
End: 2023-03-22
Payer: MEDICARE

## 2023-03-22 VITALS — WEIGHT: 160 LBS | BODY MASS INDEX: 24.25 KG/M2 | HEIGHT: 68 IN

## 2023-03-22 DIAGNOSIS — R22.9 LOCALIZED SWELLING, MASS AND LUMP, UNSPECIFIED: ICD-10-CM

## 2023-03-22 PROCEDURE — 99215 OFFICE O/P EST HI 40 MIN: CPT

## 2023-04-16 NOTE — REVIEW OF SYSTEMS
[Negative] : Neurological [Difficulty Walking] : no difficulty walking [Frequent Falls] : not falling

## 2023-04-16 NOTE — HISTORY OF PRESENT ILLNESS
[FreeTextEntry1] : 87-year-old male presents to the neurosurgery office today alongside his spouse as a follow-up for left-sided carotid artery stent placement on 2/9/2023 for symptomatic left-sided carotid artery stenosis. \par \par Citizen of [place]  used: Lia \claus ID [id number]\par \par He initially presented to the hospital with symptoms of syncope and dizziness both of which he denies today.  He is overall doing well. \par \par Patient is on Aspirin 81mg PO daily but admits to stopping his Plavix and Atorvastatin on his own merit due to the fact that he drinks vodka, small amount, every night and had concerns with interactions.  He was educated today that he needs to continue the dual antiplatelet therapy and antihyperlipidemic agent, he verbalized understanding. Going forward he will take Aspirin 81mg PO daily + Plavix 75mg PO daily + Lipitor 80mg PO HS. He has a follow-up post hospitalization appointment with cardiologist, Dr. Pruitt next week. \par \par 6-week carotid artery duplex follow-up performed on 3/21/2023 post stent placement: Unremarkable ultrasound evaluation of the left ICA stent noted\par \par The patient and the spouse were educated that the right ICA has been found to have significant stenosis and may require intervention in the future found to have an elevated velocity and 50 to 70% stenosis on ultrasound.  At this time the patient will continue the above-mentioned medication regimen and follow-up with his cardiologist.  A follow-up ultrasound in 6 months time, August 2023, is to be performed after which the patient will return to the office to further delineate the neck steps in his plan of care.  The patient verbalized understanding and was grateful for the visit today denying any further questions.

## 2023-04-16 NOTE — REASON FOR VISIT
[[employer] ] : provided by [employer]   [Follow-Up: _____] : a [unfilled] follow-up visit [Spouse] : spouse [Time Spent: ____ minutes] : Total time spent using  services: [unfilled] minutes. The patient's primary language is not English thus required  services. [Interpreters_IDNumber] : [id number] [Interpreters_FullName] : TRISTA [FreeTextEntry3] : TIME START: 12:22 [TWNoteComboBox1] : Nigerien

## 2023-04-18 ENCOUNTER — APPOINTMENT (OUTPATIENT)
Dept: CARDIOLOGY | Facility: CLINIC | Age: 88
End: 2023-04-18
Payer: MEDICARE

## 2023-04-18 ENCOUNTER — RESULT CHARGE (OUTPATIENT)
Age: 88
End: 2023-04-18

## 2023-04-18 VITALS
HEART RATE: 70 BPM | DIASTOLIC BLOOD PRESSURE: 70 MMHG | BODY MASS INDEX: 23.64 KG/M2 | WEIGHT: 156 LBS | HEIGHT: 68 IN | SYSTOLIC BLOOD PRESSURE: 142 MMHG

## 2023-04-18 DIAGNOSIS — I10 ESSENTIAL (PRIMARY) HYPERTENSION: ICD-10-CM

## 2023-04-18 DIAGNOSIS — I25.5 ISCHEMIC CARDIOMYOPATHY: ICD-10-CM

## 2023-04-18 DIAGNOSIS — I65.23 OCCLUSION AND STENOSIS OF BILATERAL CAROTID ARTERIES: ICD-10-CM

## 2023-04-18 DIAGNOSIS — I25.10 ATHEROSCLEROTIC HEART DISEASE OF NATIVE CORONARY ARTERY W/OUT ANGINA PECTORIS: ICD-10-CM

## 2023-04-18 DIAGNOSIS — Z95.828 PRESENCE OF OTHER VASCULAR IMPLANTS AND GRAFTS: ICD-10-CM

## 2023-04-18 DIAGNOSIS — E78.5 HYPERLIPIDEMIA, UNSPECIFIED: ICD-10-CM

## 2023-04-18 PROCEDURE — 99214 OFFICE O/P EST MOD 30 MIN: CPT

## 2023-04-18 PROCEDURE — 93000 ELECTROCARDIOGRAM COMPLETE: CPT

## 2023-04-18 RX ORDER — METOPROLOL SUCCINATE 25 MG/1
25 TABLET, EXTENDED RELEASE ORAL DAILY
Qty: 90 | Refills: 3 | Status: ACTIVE | COMMUNITY
Start: 2023-04-18 | End: 1900-01-01

## 2023-04-18 RX ORDER — CLOPIDOGREL BISULFATE 75 MG/1
75 TABLET, FILM COATED ORAL DAILY
Qty: 1 | Refills: 2 | Status: ACTIVE | COMMUNITY
Start: 2023-04-18 | End: 1900-01-01

## 2023-04-18 RX ORDER — ATORVASTATIN CALCIUM 80 MG/1
80 TABLET, FILM COATED ORAL
Qty: 90 | Refills: 0 | Status: ACTIVE | COMMUNITY
Start: 2023-04-18 | End: 1900-01-01

## 2023-04-18 NOTE — HISTORY OF PRESENT ILLNESS
[FreeTextEntry1] : Pt is 87 year old male with PMH of HTN, HL.  S/p hospitalization due to dizziness.  MRI  showed no acute infarct or hemorrhage, but chronic right  parietal infarcts and left V4 stenosis were identified, as well as left ICA\par origin 80% stenosis, right proximal ICA 70-80% stenosis, and moderate-severe B  supraclinoid stenosis \par Found to have b/l ICA stenosis, was started on DAPT.  s/p L ICA stent placement on 2/7/23 \par Pt was dx with  HF w/ LVEF 38%, s/p cardiac cath on 2/1/22  which identified 2 vessel CAD of RCA and LAD \par Pt did not take his medications x 1 week due to having no refills.   Pt is indecisive if he wishes to continue taking  Plavix and Atorvastatin \par Pt denies chest pain, has dizziness only when not eating on regular basis, no SOB \par Pt is active walks 2 miles per day without angina.

## 2023-04-18 NOTE — ASSESSMENT
[FreeTextEntry1] : 88 y/o male with CAD, ischemic CM, Carotid disease, s/p CEA\par Prior cath reviewed - 2 vessel disease\par Medication non-compliance\par \par Imaging, hospital records, cath, echo reviewed and discussed\par Compliance discussed\par \par Recommend:\par \par Restart Plavix, statin, add B-blocker\par C/w ASA\par Rational explained\par Will hold off on ARB - patient reports he had low BP with it, but if tolerated, will try to add next visit.\par Medical therapy for CAD - no angina\par F/u in 2-3 months\par

## 2023-04-18 NOTE — PHYSICAL EXAM
[Well Nourished] : well nourished [No Acute Distress] : no acute distress [No Carotid Bruit] : no carotid bruit [No Murmur] : no murmur [No Rub] : no rub [No Gallop] : no gallop [Good Air Entry] : good air entry [No Respiratory Distress] : no respiratory distress  [Non Tender] : non-tender [No Masses/organomegaly] : no masses/organomegaly [Normal Bowel Sounds] : normal bowel sounds [Normal Gait] : normal gait [No Edema] : no edema [No Cyanosis] : no cyanosis [No Varicosities] : no varicosities [No Clubbing] : no clubbing [No Rash] : no rash [No Skin Lesions] : no skin lesions [Moves all extremities] : moves all extremities [No Focal Deficits] : no focal deficits [Normal Speech] : normal speech [Alert and Oriented] : alert and oriented [Normal memory] : normal memory

## 2023-04-21 ENCOUNTER — APPOINTMENT (OUTPATIENT)
Dept: CARDIOLOGY | Facility: CLINIC | Age: 88
End: 2023-04-21

## 2023-04-24 ENCOUNTER — APPOINTMENT (OUTPATIENT)
Dept: CARDIOLOGY | Facility: CLINIC | Age: 88
End: 2023-04-24

## 2023-09-03 ENCOUNTER — EMERGENCY (EMERGENCY)
Facility: HOSPITAL | Age: 88
LOS: 0 days | Discharge: HOME | End: 2023-09-03
Attending: EMERGENCY MEDICINE
Payer: MEDICARE

## 2023-09-03 VITALS
DIASTOLIC BLOOD PRESSURE: 76 MMHG | TEMPERATURE: 98 F | WEIGHT: 154.98 LBS | SYSTOLIC BLOOD PRESSURE: 148 MMHG | HEART RATE: 88 BPM | OXYGEN SATURATION: 99 % | RESPIRATION RATE: 20 BRPM

## 2023-09-03 DIAGNOSIS — Z86.19 PERSONAL HISTORY OF OTHER INFECTIOUS AND PARASITIC DISEASES: ICD-10-CM

## 2023-09-03 DIAGNOSIS — Z98.890 OTHER SPECIFIED POSTPROCEDURAL STATES: Chronic | ICD-10-CM

## 2023-09-03 DIAGNOSIS — Z95.5 PRESENCE OF CORONARY ANGIOPLASTY IMPLANT AND GRAFT: ICD-10-CM

## 2023-09-03 DIAGNOSIS — S62.633B DISPLACED FRACTURE OF DISTAL PHALANX OF LEFT MIDDLE FINGER, INITIAL ENCOUNTER FOR OPEN FRACTURE: ICD-10-CM
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
## 2023-09-03 DIAGNOSIS — Z79.82 LONG TERM (CURRENT) USE OF ASPIRIN: ICD-10-CM

## 2023-09-03 DIAGNOSIS — Z79.02 LONG TERM (CURRENT) USE OF ANTITHROMBOTICS/ANTIPLATELETS: ICD-10-CM
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
## 2023-09-03 DIAGNOSIS — Z87.442 PERSONAL HISTORY OF URINARY CALCULI: ICD-10-CM

## 2023-09-03 PROCEDURE — 73140 X-RAY EXAM OF FINGER(S): CPT | Mod: 26,LT

## 2023-09-03 PROCEDURE — 73140 X-RAY EXAM OF FINGER(S): CPT | Mod: LT

## 2023-09-03 PROCEDURE — 99283 EMERGENCY DEPT VISIT LOW MDM: CPT | Mod: 25

## 2023-09-03 PROCEDURE — 99284 EMERGENCY DEPT VISIT MOD MDM: CPT | Mod: FS

## 2023-09-03 RX ORDER — CEPHALEXIN 500 MG
500 CAPSULE ORAL ONCE
Refills: 0 | Status: COMPLETED | OUTPATIENT
Start: 2023-09-03 | End: 2023-09-03

## 2023-09-03 RX ORDER — TETANUS TOXOID, REDUCED DIPHTHERIA TOXOID AND ACELLULAR PERTUSSIS VACCINE, ADSORBED 5; 2.5; 8; 8; 2.5 [IU]/.5ML; [IU]/.5ML; UG/.5ML; UG/.5ML; UG/.5ML
0.5 SUSPENSION INTRAMUSCULAR ONCE
Refills: 0 | Status: COMPLETED | OUTPATIENT
Start: 2023-09-03 | End: 2023-09-03

## 2023-09-03 RX ADMIN — Medication 500 MILLIGRAM(S): at 14:31

## 2023-09-03 RX ADMIN — Medication 1 TABLET(S): at 14:30

## 2023-09-03 NOTE — CONSULT NOTE ADULT - ASSESSMENT
ASSESSMENT:  87yM w/ no PMHx who presented with non-healing left 3rd digit laceration/wound, found to have left 3rd distal phalanx fracture. Physical exam findings, imaging, and labs as documented above.     PLAN:  - Thoroughly washed out the laceration at bedside with betadine and saline. Applied bacitracin, applied splint  - Instructed the patient to soak the affected finger  twice daily for 10minutes in warm water with a small amount of betadine. After soaking, dry the finger off, apply bacitracin and wrap with the mini kerlex. Re-apply splint. The splint can be easily removed and reapplied for the soaks. Should be worn at night time. Keep affected finger clean and dry.  -Take augmentin twice daily for 10 days  -Tetanus shot before leaving ED  -Return to ED if erythema, swelling worsens, if you develop purulent drainage from the wound.   -Follow up with Dr. Fuentes next week in the office, call to schedule the appointment on Tuesday.       Lines/Tubes: PIV    Above plan discussed with Attending Surgeon Dr. Fuentes  , patient, patient family, and Primary team  09-03-23 @ 15:20

## 2023-09-03 NOTE — ED PROVIDER NOTE - PHYSICAL EXAMINATION
CONSTITUTIONAL: Well-appearing; well-nourished; in no apparent distress.   NECK: Supple; non-tender; no cervical lymphadenopathy.   CARDIOVASCULAR: Normal S1, S2; no murmurs, rubs, or gallops.   RESPIRATORY: Normal chest excursion with respiration  GI/: Non-distended; non-tender; no palpable organomegaly.   MS: L 3rd digit with distal swelling, lac with granular tissue just over DIP joint, otherwise nontender to palp and FROM; distal pulses are normal.   SKIN: see above, otherwise warm; dry; good turgor; no apparent lesions or exudate.   NEURO/PSYCH: A & O x 4; grossly unremarkable. mood and manner are appropriate.

## 2023-09-03 NOTE — ED ADULT NURSE NOTE - CHIEF COMPLAINT QUOTE
left middle finger lac 10 days prior while working in the garden pt. states he's been putting neosporin on it

## 2023-09-03 NOTE — ED PROVIDER NOTE - CARE PROVIDER_API CALL
Francis Fuentes  Plastic Surgery  [address] [place] [address]  Phone: [phone]  Fax: [phone]  Follow Up Time: 1-3 Days

## 2023-09-03 NOTE — ED PROVIDER NOTE - OBJECTIVE STATEMENT
pt with pmhx carotid stent on ASA presents to ED with wife c/o L 3rd digit lac sustained from piece of wood in his yard 10 days ago, not healing. Denies fever/chill/HA/dizziness/chest pain/palpitation/sob/abd pain/n/v/d/ black stool/bloody stool/urinary sxs

## 2023-09-03 NOTE — ED PROVIDER NOTE - PROGRESS NOTE DETAILS
per surg covering hand, dc with augmentin to f/u with  Discussed possible side effects of abx. including but not limited to cdiff/resistance/GI upset. if intolerance, dc use and return to office . Also if there is no improvement, return for re-evaluation. advised to take probiotics.  rx called in because demographic info not allowing to e-prescribe

## 2023-09-03 NOTE — ED ADULT TRIAGE NOTE - CHIEF COMPLAINT QUOTE
left middle finger lac 10 days prior while working in the garden left middle finger lac 10 days prior while working in the garden pt. states he's been putting neosporin on it

## 2023-09-03 NOTE — CONSULT NOTE ADULT - SUBJECTIVE AND OBJECTIVE BOX
GENERAL SURGERY CONSULT NOTE    Patient: LOIS KANG , 87y ([birth year])Male   MRN: [id number]  Location: [facility] ED  Visit: 09-03-23 Emergency  Date: 09-03-23 @ 15:20    HPI: 87M presents to ED with left 3rd digit non-healing laceration. The patient states he was cutting wood 10 days ago, a piece of wood splintered off and cut his finger. The patient has been applying an antibiotic ointment and aloe vera with no improvement of the erythema. Upon work up in the ED the patient was found to have a left 3rd distal phalanx fracture. The patient denies pain, has full range of motion in his finger and full sensation. The patient denies any swelling, drainage from the wound or collection.     History obtained via  phone in [place]ian ID [id number]      PAST MEDICAL & SURGICAL HISTORY:  Kidney stones  Urinary tract infection, E. coli  H/O lithotripsy      Home Medications:  aspirin 81 mg oral tablet: 81 milligram(s) orally once a day (31 Jan 2023 21:01)  Flomax 0.4 mg oral capsule: 1 cap(s) orally once a day (at bedtime) (10 Feb 2023 14:36)  Plavix 75 mg oral tablet: 1 tab(s) orally once a day (31 Jan 2023 21:01)        VITALS:  T(F): 98.3 (09-03-23 @ 12:26), Max: 98.3 (09-03-23 @ 12:26)  HR: 88 (09-03-23 @ 12:26) (88 - 88)  BP: 148/76 (09-03-23 @ 12:26) (148/76 - 148/76)  RR: 20 (09-03-23 @ 12:26) (20 - 20)  SpO2: 99% (09-03-23 @ 12:26) (99% - 99%)    PHYSICAL EXAM:  GENERAL: NAD, well-appearing  CHEST/LUNG: Clear to auscultation bilaterally  HEART: Regular rate and rhythm  ABDOMEN: Soft, Nontender, Nondistended;   EXTREMITIES:  Left middle finger with a laceration across the DIP, no signs of cellulitis, minimal erythema, no purulent drainage, full range of motion in the 3rd finger, able to make a fist. +sensation.       MEDICATIONS  (STANDING):      LAB/STUDIES:      IMAGING:  < from: Xray Finger, Left Hand (09.03.23 @ 13:41) >    The bones are osteopenic. There are degenerative changes. There is a   fracture involving the distal third phalanx with adjacent soft tissue   swelling.    --- End of Report ---    < end of copied text >      ACCESS DEVICES:  [x ] Peripheral IV

## 2023-09-03 NOTE — ED PROVIDER NOTE - PATIENT PORTAL LINK FT
You can access the FollowMyHealth Patient Portal offered by [facility] by registering at the following website: [email or web address]. By joining [employer]’s FollowMyHealth portal, you will also be able to view your health information using other applications (apps) compatible with our system.

## 2023-09-03 NOTE — ED PROVIDER NOTE - NSFOLLOWUPINSTRUCTIONS_ED_ALL_ED_FT
Non-weight bearing fracture    A fracture is a break in one of your bones. This can occur from a variety of injuries especially traumatic ones. Symptoms include pain, bruising, or swelling.  A splint might have been applied by your health care provider. Make sure to keep it dry and follow up with an orthopedist as instructed.    SEEK IMMEDIATE MEDICAL CARE IF YOU HAVE THE FOLLOWING SYMPTOMS: numbness, tingling, pain, or weakness in any part of your body distal to the fracture.  Laceration    WHAT YOU NEED TO KNOW:    A laceration is an injury to the skin and the soft tissue underneath it. Lacerations happen when you are cut or hit by something. They can happen anywhere on the body.     DISCHARGE INSTRUCTIONS:    Return to the emergency department if:     You have heavy bleeding or bleeding that does not stop after 10 minutes of holding firm, direct pressure over the wound.       Your wound opens up.     Contact your healthcare provider if:     You have a fever or chills.       Your laceration is red, warm, or swollen.      You have red streaks on your skin coming from your wound.      You have white or yellow drainage from the wound that smells bad.      You have pain that gets worse, even after treatment.       You have questions or concerns about your condition or care.     Medicines:     Prescription pain medicine may be given. Ask how to take this medicine safely.       Antibiotics help treat or prevent a bacterial infection.       Take your medicine as directed. Contact your healthcare provider if you think your medicine is not helping or if you have side effects. Tell him or her if you are allergic to any medicine. Keep a list of the medicines, vitamins, and herbs you take. Include the amounts, and when and why you take them. Bring the list or the pill bottles to follow-up visits. Carry your medicine list with you in case of an emergency.    Care for your wound as directed:     Do not get your wound wet until your healthcare provider says it is okay. Do not soak your wound in water. Do not go swimming until your healthcare provider says it is okay. Carefully wash the wound with soap and water. Gently pat the area dry or allow it to air dry.       Change your bandages when they get wet, dirty, or after washing. Apply new, clean bandages as directed. Do not apply elastic bandages or tape too tight. Do not put powders or lotions over your incision.       Apply antibiotic ointment as directed. Your healthcare provider may give you antibiotic ointment to put over your wound if you have stitches. If you have strips of tape over your incision, let them dry up and fall off on their own. If they do not fall off within 14 days, gently remove them. If you have glue over your wound, do not remove or pick at it. If your glue comes off, do not replace it with glue that you have at home.       Check your wound every day for signs of infection such as swelling, redness, or pus.     Self-care:     Apply ice on your wound for 15 to 20 minutes every hour or as directed. Use an ice pack, or put crushed ice in a plastic bag. Cover it with a towel. Ice helps prevent tissue damage and decreases swelling and pain.      Use a splint as directed. A splint will decrease movement and stress on your wound. It may help it heal faster. A splint may be used for lacerations over joints or areas of your body that bend. Ask your healthcare provider how to apply and remove a splint.       Decrease scarring of your wound by applying ointments as directed. Do not apply ointments until your healthcare provider says it is okay. You may need to wait until your wound is healed. Ask which ointment to buy and how often to use it. After your wound is healed, use sunscreen over the area when you are out in the sun. You should do this for at least 6 months to 1 year after your injury.     Follow up with your healthcare provider as directed: You may need to follow up in 24 to 48 hours to have your wound checked for infection. You will need to return in 3 to 14 days if you have stitches or staples so they can be removed. Care for your wound as directed to prevent infection and help it heal. Write down your questions so you remember to ask them during your visits.       © Copyright [employer] 2019 All illustrations and images included in CareNotes are the copyrighted property of [employer].A[employer]., Inc. or [employer].

## 2023-09-03 NOTE — ED PROVIDER NOTE - CLINICAL SUMMARY MEDICAL DECISION MAKING FREE TEXT BOX
Patient presented status post accidental left third digit laceration as documented.  States that the event occurred 10 days ago, but he did not seek medical attention until today.  On arrival to ED, patient otherwise afebrile, hemodynamically stable, grossly neurovascularly intact, but marked swelling to the distal fingertip with open laceration and concern for possible underlying fracture.  X-ray obtained which confirmed underlying fracture.  Patient was started on antibiotics and consulted hand surgery who evaluated the patient in the ED.  Recommending Augmentin, outpatient follow-up.  Patient agreeable with plan. Agrees to return to ED for any new or worsening symptoms.

## 2023-09-18 ENCOUNTER — APPOINTMENT (OUTPATIENT)
Dept: CARDIOLOGY | Facility: CLINIC | Age: 88
End: 2023-09-18

## 2023-10-28 NOTE — PROGRESS NOTE ADULT - ASSESSMENT
82 year old M with PMHx of nephrolithiasis sent in from urologist's office for symptoms of acute dysuria including difficulty voiding, frequent nocturia with markedly reduced amounts of urine voided, and fever of 101 F measured at office.    #Acute pyelonephritis   E. Coli cultured from urine  f/u blood cx  c/w Rocephin  White count decreasing  c/w carrasco catheter for urinary retention  c/w tamsulosin    #Prior nephrolithiasis s/p lithotripsy as o/p  f/u retroperitoneal u/s  c/w IVF Discharged

## 2024-08-06 NOTE — H&P ADULT - NSHPATTENDINGPLANDISCUSS_GEN_ALL_CORE
August 6, 2024      Re: Jeanmarie Katz  [address]  [facility] [address]      This is to certify that Jeanmarie Katz has been under my care on 8/6/2024.  Please excuse from work on today's date due to medical indication.  Not contagious.  May return to work on 8/7/24.           SIGNATURE:___________________________________________,   8/6/2024            Lonny Naylor MD   [facility]  [address]  [address] [phone]            
patient

## 2024-11-19 ENCOUNTER — EMERGENCY (EMERGENCY)
Facility: HOSPITAL | Age: 89
LOS: 0 days | Discharge: HOME | End: 2024-11-19
Attending: EMERGENCY MEDICINE
Payer: MEDICARE

## 2024-11-19 VITALS
OXYGEN SATURATION: 98 % | HEART RATE: 98 BPM | TEMPERATURE: 98 F | DIASTOLIC BLOOD PRESSURE: 72 MMHG | RESPIRATION RATE: 16 BRPM | WEIGHT: 149.91 LBS | SYSTOLIC BLOOD PRESSURE: 128 MMHG | HEIGHT: 68 IN

## 2024-11-19 DIAGNOSIS — Z98.890 OTHER SPECIFIED POSTPROCEDURAL STATES: Chronic | ICD-10-CM

## 2024-11-19 LAB
APPEARANCE UR: ABNORMAL
BILIRUB UR-MCNC: NEGATIVE — SIGNIFICANT CHANGE UP
COLOR SPEC: YELLOW — SIGNIFICANT CHANGE UP
DIFF PNL FLD: ABNORMAL
GLUCOSE UR QL: NEGATIVE MG/DL — SIGNIFICANT CHANGE UP
KETONES UR-MCNC: NEGATIVE MG/DL — SIGNIFICANT CHANGE UP
LEUKOCYTE ESTERASE UR-ACNC: ABNORMAL
NITRITE UR-MCNC: POSITIVE
PH UR: 6 — SIGNIFICANT CHANGE UP (ref 5–8)
PROT UR-MCNC: 300 MG/DL
SP GR SPEC: 1.01 — SIGNIFICANT CHANGE UP (ref 1–1.03)
UROBILINOGEN FLD QL: 0.2 MG/DL — SIGNIFICANT CHANGE UP (ref 0.2–1)

## 2024-11-19 PROCEDURE — 99284 EMERGENCY DEPT VISIT MOD MDM: CPT | Mod: 25

## 2024-11-19 PROCEDURE — 81001 URINALYSIS AUTO W/SCOPE: CPT

## 2024-11-19 PROCEDURE — 51702 INSERT TEMP BLADDER CATH: CPT

## 2024-11-19 PROCEDURE — 87086 URINE CULTURE/COLONY COUNT: CPT

## 2024-11-19 PROCEDURE — 99284 EMERGENCY DEPT VISIT MOD MDM: CPT | Mod: GC

## 2024-11-19 RX ORDER — CEFPODOXIME PROXETIL 200 MG/1
1 TABLET, FILM COATED ORAL
Qty: 20 | Refills: 0
Start: 2024-11-19 | End: 2024-11-28

## 2024-11-19 NOTE — ED PROVIDER NOTE - NSPTACCESSSVCSAPPTDETAILS_ED_ALL_ED_FT
Urinary retention with carrasco. benign prostate hyperplasia.  urgent follow up within 2-4 days.  Zambian speaking

## 2024-11-19 NOTE — ED PROVIDER NOTE - PROGRESS NOTE DETAILS
Bharat Boo, DO: Bladder scan with approximately 400 to 500 cc on ultrasound.  Enlarged prostate visualized. Bharat Boo, DO: Singh replaced.  Catheterized urine with evidence of infection.  Will treat with cefpodoxime outpatient.  Urgent urology referral placed given unclear follow-up of current urology.  Patient is well appearing, NAD, afebrile, hemodynamically stable. Any available tests and studies were discussed with patient and/or family. Discharged with instructions in further symptomatic care, return precautions, and need for PMD f/u.

## 2024-11-19 NOTE — ED PROVIDER NOTE - CLINICAL SUMMARY MEDICAL DECISION MAKING FREE TEXT BOX
89-year-old man, history of BPH, kidney stones, UTI had Singh removed today at family doctors office after 2 weeks in for urinary retention.  Has not been able to void since Singh was removed.  Denies abdominal pain, back pain, nausea, vomiting.  Patient is well-appearing, lungs clear, CV S1-S2, abdomen soft, nontender, no CVA tenderness.  Singh was replaced; urine with nitrites, leukoesterase, white blood cells, unclear if simple colonization given presence of short-term Singh.  Will treat with antibiotics, recommend urology follow-up.  Patient and family comfortable with discharge.

## 2024-11-19 NOTE — ED PROVIDER NOTE - NSFOLLOWUPINSTRUCTIONS_ED_ALL_ED_FT
Our Emergency Department Referral Coordinators will be reaching out to you in the next 24-48 hours from 9:00am to 5:00pm with a follow up appointment. Please expect a phone call from the hospital in that time frame. If you do not receive a call or if you have any questions or concerns, you can reach them at   [phone]    Urinary Tract Infection    You were seen and evaluated in the Emergency Department. It was found to you have a Urinary Tract Infection (UTI). It has been determined that it is safe for you to be discharged and attempt outpatient management. Please take antibiotic as prescribed and monitor your symptoms. If your antibiotic needs to be changed based on test results, you will be contacted. There is a chance you may have to return for re-evaluation and possible admission if oral antibiotics are not working. Please monitor your symptoms and see how you feel after 48 hours of antibiotics, unless one of the strict return precautions we discussed happens. Follow up with your doctor and bring all of your results. You may require further work up and management, which may include evaluation by a specialist (Urologist)    Overview  A urinary tract infection, or UTI, is a general term for an infection anywhere between the kidneys and the urethra (where urine comes out). Most UTIs are bladder infections. They often cause pain or burning when you urinate.    UTIs are caused by bacteria and can be cured with antibiotics. Be sure to complete your treatment so that the infection does not get worse.    Follow-up care is a key part of your treatment and safety. Be sure to make and go to all appointments, and call your doctor or nurse call line if you are having problems. It's also a good idea to know your test results and keep a list of the medicines you take.    How can you care for yourself at home?  Take your antibiotics as directed. Do not stop taking them just because you feel better. You need to take the full course of antibiotics.  Drink extra water and other fluids for the next day or two. This will help make the urine less concentrated and help wash out the bacteria that are causing the infection. (If you have kidney, heart, or liver disease and have to limit fluids, talk with your doctor before you increase the amount of fluids you drink.)  Avoid drinks that are carbonated or have caffeine. They can irritate the bladder.  Urinate often. Try to empty your bladder each time.  To relieve pain, take a hot bath or lay a heating pad set on low over your lower belly or genital area. Never go to sleep with a heating pad in place.  To prevent UTIs  Drink plenty of water each day. This helps you urinate often, which clears bacteria from your system. (If you have kidney, heart, or liver disease and have to limit fluids, talk with your doctor before you increase the amount of fluids you drink.)  Urinate when you need to.  If you are sexually active, urinate right after you have sex.  Change sanitary pads often.  Avoid douches, bubble baths, feminine hygiene sprays, and other feminine hygiene products that have deodorants.  After you use the toilet, wipe from front to back.  When should you call for help?  	  Call your doctor or nurse call line now or seek immediate medical care if:    Symptoms such as fever, chills, nausea, or vomiting get worse or appear for the first time.  You have new pain in your back just below your rib cage. This is called flank pain.  There is new blood or pus in your urine.  You have any problems with your antibiotic medicine.  Watch closely for changes in your health, and be sure to contact your doctor or nurse call line if:    You do not feel better after 2 days on an antibiotic.  Your symptoms go away but then come back.

## 2024-11-19 NOTE — ED ADULT NURSE NOTE - OBJECTIVE STATEMENT
pt presents from home s/p failed trial void after removal of carrasco cath this morning at urologist.

## 2024-11-19 NOTE — ED PROVIDER NOTE - PATIENT PORTAL LINK FT
You can access the FollowMyHealth Patient Portal offered by [facility] by registering at the following website: [email or web address]. By joining [employer]’s FollowMyHealth portal, you will also be able to view your health information using other applications (apps) compatible with our system.

## 2024-11-19 NOTE — ED PROVIDER NOTE - OBJECTIVE STATEMENT
89-year-old male with past ministry of benign prostate hyperplasia, history of kidney stones and UTI, who reportedly had a Singh placed for the past 2 weeks and wedges removed today and family clinics office around 10 AM.  However since then has not been able to void.  Patient denies any significant pain, fevers, chills, nausea/vomiting/diarrhea.  States has a urologist although unclear when able to follow-up with.    Rwandan speaking   Briana #[id number]

## 2024-11-19 NOTE — ED ADULT NURSE NOTE - NSFALLUNIVINTERV_ED_ALL_ED
Bed/Stretcher in lowest position, wheels locked, appropriate side rails in place/Call bell, personal items and telephone in reach/Instruct patient to call for assistance before getting out of bed/chair/stretcher/Non-slip footwear applied when patient is off stretcher/[place] to call system/Physically safe environment - no spills, clutter or unnecessary equipment/Purposeful proactive rounding/Room/bathroom lighting operational, light cord in reach

## 2024-11-19 NOTE — ED ADULT TRIAGE NOTE - CHIEF COMPLAINT QUOTE
pt had carrasco cath x 3 weeks. cath was removed 10am this morning at doctors office and has not urinated since.

## 2025-02-20 ENCOUNTER — APPOINTMENT (OUTPATIENT)
Dept: CARDIOLOGY | Facility: CLINIC | Age: 89
End: 2025-02-20
Payer: MEDICARE

## 2025-02-20 PROCEDURE — 93306 TTE W/DOPPLER COMPLETE: CPT

## 2025-02-24 ENCOUNTER — NON-APPOINTMENT (OUTPATIENT)
Age: 89
End: 2025-02-24

## 2025-02-24 ENCOUNTER — APPOINTMENT (OUTPATIENT)
Dept: CARDIOLOGY | Facility: CLINIC | Age: 89
End: 2025-02-24
Payer: MEDICARE

## 2025-02-24 ENCOUNTER — RESULT CHARGE (OUTPATIENT)
Age: 89
End: 2025-02-24

## 2025-02-24 VITALS
BODY MASS INDEX: 22.13 KG/M2 | SYSTOLIC BLOOD PRESSURE: 122 MMHG | HEART RATE: 83 BPM | DIASTOLIC BLOOD PRESSURE: 70 MMHG | WEIGHT: 146 LBS | HEIGHT: 68 IN

## 2025-02-24 DIAGNOSIS — I45.2 BIFASCICULAR BLOCK: ICD-10-CM

## 2025-02-24 DIAGNOSIS — Z01.810 ENCOUNTER FOR PREPROCEDURAL CARDIOVASCULAR EXAMINATION: ICD-10-CM

## 2025-02-24 DIAGNOSIS — I25.10 ATHEROSCLEROTIC HEART DISEASE OF NATIVE CORONARY ARTERY W/OUT ANGINA PECTORIS: ICD-10-CM

## 2025-02-24 DIAGNOSIS — I10 ESSENTIAL (PRIMARY) HYPERTENSION: ICD-10-CM

## 2025-02-24 DIAGNOSIS — R73.03 PREDIABETES.: ICD-10-CM

## 2025-02-24 DIAGNOSIS — I25.5 ISCHEMIC CARDIOMYOPATHY: ICD-10-CM

## 2025-02-24 DIAGNOSIS — E78.5 HYPERLIPIDEMIA, UNSPECIFIED: ICD-10-CM

## 2025-02-24 DIAGNOSIS — I65.23 OCCLUSION AND STENOSIS OF BILATERAL CAROTID ARTERIES: ICD-10-CM

## 2025-02-24 PROCEDURE — 93000 ELECTROCARDIOGRAM COMPLETE: CPT

## 2025-02-24 PROCEDURE — 99214 OFFICE O/P EST MOD 30 MIN: CPT

## 2025-02-24 PROCEDURE — G2211 COMPLEX E/M VISIT ADD ON: CPT

## 2025-06-14 NOTE — OCCUPATIONAL THERAPY INITIAL EVALUATION ADULT - PHYSICAL ASSIST/NONPHYSICAL ASSIST, OT EVAL
4 Eyes Skin Assessment     NAME:  Stewart Mark  YOB: 1949  MEDICAL RECORD NUMBER:  [id number]    The patient is being assessed for  Admission    I agree that at least one RN has performed a thorough Head to Toe Skin Assessment on the patient. ALL assessment sites listed below have been assessed.      Areas assessed by both nurses:    Head, Face, Ears, Shoulders, Back, Chest, Arms, Elbows, Hands, Sacrum. Buttock, Coccyx, Ischium, Legs. Feet and Heels, and Under Medical Devices         Does the Patient have a Wound? Yes wound(s) were present on assessment. LDA wound assessment was Initiated and completed by RN       Bienvenido Prevention initiated by RN: Yes  Wound Care Orders initiated by RN: No    Pressure Injury (Stage 3,4, Unstageable, DTI, NWPT, and Complex wounds) if present, place Wound referral order by RN under : No    New Ostomies, if present place, Ostomy referral order under : No     Nurse 1 eSignature: Electronically signed by Nikolas Sánchez RN on 6/13/25 at 10:14 PM EDT    **SHARE this note so that the co-signing nurse can place an eSignature**    Nurse 2 eSignature: Electronically signed by Cleopatra Mendoza RN on 6/13/25 at 10:36 PM EDT    seated/verbal cues/1 person assist

## 2025-08-25 ENCOUNTER — RESULT CHARGE (OUTPATIENT)
Age: 89
End: 2025-08-25

## 2025-08-25 ENCOUNTER — APPOINTMENT (OUTPATIENT)
Dept: CARDIOLOGY | Facility: CLINIC | Age: 89
End: 2025-08-25
Payer: MEDICARE

## 2025-08-25 VITALS
BODY MASS INDEX: 21.98 KG/M2 | OXYGEN SATURATION: 93 % | DIASTOLIC BLOOD PRESSURE: 66 MMHG | HEIGHT: 68 IN | WEIGHT: 145 LBS | SYSTOLIC BLOOD PRESSURE: 100 MMHG | HEART RATE: 81 BPM

## 2025-08-25 DIAGNOSIS — I10 ESSENTIAL (PRIMARY) HYPERTENSION: ICD-10-CM

## 2025-08-25 DIAGNOSIS — I25.5 ISCHEMIC CARDIOMYOPATHY: ICD-10-CM

## 2025-08-25 DIAGNOSIS — E78.5 HYPERLIPIDEMIA, UNSPECIFIED: ICD-10-CM

## 2025-08-25 DIAGNOSIS — I25.10 ATHEROSCLEROTIC HEART DISEASE OF NATIVE CORONARY ARTERY W/OUT ANGINA PECTORIS: ICD-10-CM

## 2025-08-25 PROCEDURE — 93000 ELECTROCARDIOGRAM COMPLETE: CPT

## 2025-08-25 PROCEDURE — G2211 COMPLEX E/M VISIT ADD ON: CPT

## 2025-08-25 PROCEDURE — 99214 OFFICE O/P EST MOD 30 MIN: CPT

## 2025-08-25 RX ORDER — APIXABAN 5 MG/1
5 TABLET, FILM COATED ORAL
Qty: 90 | Refills: 6 | Status: ACTIVE | COMMUNITY